# Patient Record
Sex: MALE | Race: WHITE | NOT HISPANIC OR LATINO | Employment: FULL TIME | URBAN - METROPOLITAN AREA
[De-identification: names, ages, dates, MRNs, and addresses within clinical notes are randomized per-mention and may not be internally consistent; named-entity substitution may affect disease eponyms.]

---

## 2017-08-08 ENCOUNTER — HOSPITAL ENCOUNTER (OUTPATIENT)
Dept: GENERAL RADIOLOGY | Age: 52
Discharge: HOME | End: 2017-08-08
Attending: ORTHOPAEDIC SURGERY

## 2017-08-08 ENCOUNTER — HOSPITAL ENCOUNTER (OUTPATIENT)
Dept: ULTRASOUND IMAGING | Age: 52
Discharge: HOME | End: 2017-08-08
Attending: ORTHOPAEDIC SURGERY

## 2017-08-08 DIAGNOSIS — M85.672 OTHER CYST OF BONE, LEFT ANKLE AND FOOT: ICD-10-CM

## 2017-08-08 PROCEDURE — 20550 NJX 1 TENDON SHEATH/LIGAMENT: CPT | Performed by: RADIOLOGY

## 2017-08-08 PROCEDURE — 76942 ECHO GUIDE FOR BIOPSY: CPT | Performed by: RADIOLOGY

## 2017-08-08 RX ORDER — TRIAMCINOLONE ACETONIDE 40 MG/ML
40 INJECTION, SUSPENSION INTRA-ARTICULAR; INTRAMUSCULAR ONCE
Status: COMPLETED | OUTPATIENT
Start: 2017-08-08 | End: 2017-08-08

## 2017-08-08 RX ADMIN — TRIAMCINOLONE ACETONIDE 40 MG: 40 INJECTION, SUSPENSION INTRA-ARTICULAR; INTRAMUSCULAR at 14:31

## 2017-12-23 ENCOUNTER — HOSPITAL ENCOUNTER (EMERGENCY)
Age: 52
Discharge: HOME | End: 2017-12-24
Attending: EMERGENCY MEDICINE

## 2017-12-23 VITALS
RESPIRATION RATE: 16 BRPM | OXYGEN SATURATION: 96 % | DIASTOLIC BLOOD PRESSURE: 97 MMHG | SYSTOLIC BLOOD PRESSURE: 160 MMHG | TEMPERATURE: 98.2 F | HEART RATE: 110 BPM

## 2017-12-23 DIAGNOSIS — L03.319 CELLULITIS AND ABSCESS OF TRUNK: Primary | ICD-10-CM

## 2017-12-23 DIAGNOSIS — L02.219 CELLULITIS AND ABSCESS OF TRUNK: Primary | ICD-10-CM

## 2017-12-23 PROCEDURE — 99284 EMERGENCY DEPT VISIT MOD MDM: CPT | Performed by: EMERGENCY MEDICINE

## 2017-12-23 PROCEDURE — 99282 EMERGENCY DEPT VISIT SF MDM: CPT

## 2017-12-23 ASSESSMENT — PAIN SCALES - GENERAL: PAINLEVEL_OUTOF10: 0

## 2017-12-24 ENCOUNTER — HOSPITAL ENCOUNTER (OUTPATIENT)
Age: 52
Setting detail: OBSERVATION
Discharge: HOME | End: 2017-12-28
Attending: HOSPITALIST

## 2017-12-24 DIAGNOSIS — E86.0 DEHYDRATION: ICD-10-CM

## 2017-12-24 DIAGNOSIS — L02.214 ABSCESS OF LEFT GROIN: Primary | ICD-10-CM

## 2017-12-24 DIAGNOSIS — L03.314 CELLULITIS OF GROIN, LEFT: ICD-10-CM

## 2017-12-24 LAB
ALBUMIN SERPL-MCNC: 3.5 G/DL (ref 3.6–5.1)
ALBUMIN/GLOB SERPL: 0.9 {RATIO} (ref 1–2.4)
ALP SERPL-CCNC: 75 UNITS/L (ref 45–117)
ALT SERPL-CCNC: 40 UNITS/L
ANION GAP SERPL CALC-SCNC: 11 MMOL/L (ref 10–20)
AST SERPL-CCNC: 29 UNITS/L
BASOPHILS # BLD AUTO: 0 K/MCL (ref 0–0.3)
BASOPHILS NFR BLD AUTO: 0 %
BILIRUB SERPL-MCNC: 0.3 MG/DL (ref 0.2–1)
BUN SERPL-MCNC: 21 MG/DL (ref 6–20)
BUN/CREAT SERPL: 17 (ref 7–25)
CALCIUM SERPL-MCNC: 11.2 MG/DL (ref 8.4–10.2)
CHLORIDE SERPL-SCNC: 104 MMOL/L (ref 98–107)
CO2 SERPL-SCNC: 32 MMOL/L (ref 21–32)
CREAT SERPL-MCNC: 1.25 MG/DL (ref 0.67–1.17)
DIFFERENTIAL METHOD BLD: ABNORMAL
EOSINOPHIL # BLD AUTO: 0.2 K/MCL (ref 0.1–0.5)
EOSINOPHIL NFR SPEC: 1 %
ERYTHROCYTE [DISTWIDTH] IN BLOOD: 13.1 % (ref 11–15)
GLOBULIN SER-MCNC: 3.7 G/DL (ref 2–4)
GLUCOSE SERPL-MCNC: 111 MG/DL (ref 65–99)
HCT VFR BLD CALC: 42.8 % (ref 39–51)
HGB BLD-MCNC: 14 G/DL (ref 13–17)
LACTATE SERPL-SCNC: 1.1 MMOL/L (ref 0–2)
LYMPHOCYTES # BLD MANUAL: 2.2 K/MCL (ref 1–4)
LYMPHOCYTES NFR BLD MANUAL: 16 %
MCH RBC QN AUTO: 31.3 PG (ref 26–34)
MCHC RBC AUTO-ENTMCNC: 32.7 G/DL (ref 32–36.5)
MCV RBC AUTO: 95.5 FL (ref 78–100)
MONOCYTES # BLD MANUAL: 1.5 K/MCL (ref 0.3–0.9)
MONOCYTES NFR BLD MANUAL: 10 %
NEUTROPHILS # BLD: 10.3 K/MCL (ref 1.8–7.7)
NEUTROPHILS NFR BLD AUTO: 73 %
PLATELET # BLD: 338 K/MCL (ref 140–450)
POTASSIUM SERPL-SCNC: 4 MMOL/L (ref 3.4–5.1)
PROT SERPL-MCNC: 7.2 G/DL (ref 6.4–8.2)
RBC # BLD: 4.48 MIL/MCL (ref 4.5–5.9)
SODIUM SERPL-SCNC: 143 MMOL/L (ref 135–145)
WBC # BLD: 14.2 K/MCL (ref 4.2–11)

## 2017-12-24 PROCEDURE — 10002807 HB RX 258: Performed by: HOSPITALIST

## 2017-12-24 PROCEDURE — 10002807 HB RX 258: Performed by: PHYSICIAN ASSISTANT

## 2017-12-24 PROCEDURE — 96375 TX/PRO/DX INJ NEW DRUG ADDON: CPT

## 2017-12-24 PROCEDURE — 87040 BLOOD CULTURE FOR BACTERIA: CPT

## 2017-12-24 PROCEDURE — 96376 TX/PRO/DX INJ SAME DRUG ADON: CPT

## 2017-12-24 PROCEDURE — 10002800 HB RX 250 W HCPCS: Performed by: HOSPITALIST

## 2017-12-24 PROCEDURE — 99283 EMERGENCY DEPT VISIT LOW MDM: CPT

## 2017-12-24 PROCEDURE — 10002803 HB RX 637: Performed by: EMERGENCY MEDICINE

## 2017-12-24 PROCEDURE — 83605 ASSAY OF LACTIC ACID: CPT

## 2017-12-24 PROCEDURE — 99285 EMERGENCY DEPT VISIT HI MDM: CPT | Performed by: PHYSICIAN ASSISTANT

## 2017-12-24 PROCEDURE — 36415 COLL VENOUS BLD VENIPUNCTURE: CPT

## 2017-12-24 PROCEDURE — 96361 HYDRATE IV INFUSION ADD-ON: CPT

## 2017-12-24 PROCEDURE — 96365 THER/PROPH/DIAG IV INF INIT: CPT

## 2017-12-24 PROCEDURE — 10002800 HB RX 250 W HCPCS: Performed by: PHYSICIAN ASSISTANT

## 2017-12-24 PROCEDURE — 10002803 HB RX 637: Performed by: HOSPITALIST

## 2017-12-24 PROCEDURE — 80053 COMPREHEN METABOLIC PANEL: CPT

## 2017-12-24 PROCEDURE — 96366 THER/PROPH/DIAG IV INF ADDON: CPT

## 2017-12-24 PROCEDURE — 85025 COMPLETE CBC W/AUTO DIFF WBC: CPT

## 2017-12-24 PROCEDURE — G0378 HOSPITAL OBSERVATION PER HR: HCPCS

## 2017-12-24 RX ORDER — CEPHALEXIN 500 MG/1
500 CAPSULE ORAL 4 TIMES DAILY
Qty: 40 CAPSULE | Refills: 0 | Status: ON HOLD | OUTPATIENT
Start: 2017-12-24 | End: 2017-12-25 | Stop reason: ALTCHOICE

## 2017-12-24 RX ORDER — ONDANSETRON 2 MG/ML
4 INJECTION INTRAMUSCULAR; INTRAVENOUS ONCE
Status: COMPLETED | OUTPATIENT
Start: 2017-12-24 | End: 2017-12-24

## 2017-12-24 RX ORDER — ONDANSETRON 2 MG/ML
4 INJECTION INTRAMUSCULAR; INTRAVENOUS EVERY 12 HOURS PRN
Status: DISCONTINUED | OUTPATIENT
Start: 2017-12-24 | End: 2017-12-25

## 2017-12-24 RX ORDER — CLOTRIMAZOLE 1 %
CREAM (GRAM) TOPICAL 2 TIMES DAILY
Qty: 30 G | Refills: 0 | Status: SHIPPED | OUTPATIENT
Start: 2017-12-24 | End: 2018-03-15 | Stop reason: CLARIF

## 2017-12-24 RX ORDER — 0.9 % SODIUM CHLORIDE 0.9 %
2 VIAL (ML) INJECTION EVERY 12 HOURS SCHEDULED
Status: DISCONTINUED | OUTPATIENT
Start: 2017-12-24 | End: 2017-12-28 | Stop reason: HOSPADM

## 2017-12-24 RX ORDER — LORATADINE 10 MG/1
10 TABLET ORAL
Status: DISCONTINUED | OUTPATIENT
Start: 2017-12-25 | End: 2017-12-28 | Stop reason: HOSPADM

## 2017-12-24 RX ORDER — CEPHALEXIN 500 MG/1
500 CAPSULE ORAL ONCE
Status: COMPLETED | OUTPATIENT
Start: 2017-12-24 | End: 2017-12-24

## 2017-12-24 RX ORDER — ENOXAPARIN SODIUM 100 MG/ML
40 INJECTION SUBCUTANEOUS EVERY 24 HOURS
Status: DISCONTINUED | OUTPATIENT
Start: 2017-12-25 | End: 2017-12-28 | Stop reason: HOSPADM

## 2017-12-24 RX ORDER — 0.9 % SODIUM CHLORIDE 0.9 %
2 VIAL (ML) INJECTION PRN
Status: DISCONTINUED | OUTPATIENT
Start: 2017-12-24 | End: 2017-12-28 | Stop reason: HOSPADM

## 2017-12-24 RX ORDER — TRAMADOL HYDROCHLORIDE 50 MG/1
50 TABLET ORAL EVERY 6 HOURS PRN
Status: DISCONTINUED | OUTPATIENT
Start: 2017-12-24 | End: 2017-12-28 | Stop reason: HOSPADM

## 2017-12-24 RX ADMIN — TRAMADOL HYDROCHLORIDE 50 MG: 50 TABLET, FILM COATED ORAL at 22:01

## 2017-12-24 RX ADMIN — HYDROMORPHONE HYDROCHLORIDE 0.5 MG: 1 INJECTION, SOLUTION INTRAMUSCULAR; INTRAVENOUS; SUBCUTANEOUS at 23:55

## 2017-12-24 RX ADMIN — ONDANSETRON 4 MG: 2 INJECTION INTRAMUSCULAR; INTRAVENOUS at 19:48

## 2017-12-24 RX ADMIN — VANCOMYCIN HYDROCHLORIDE 500 MG: 500 INJECTION, POWDER, LYOPHILIZED, FOR SOLUTION INTRAVENOUS at 23:18

## 2017-12-24 RX ADMIN — CEPHALEXIN 500 MG: 500 CAPSULE ORAL at 00:21

## 2017-12-24 RX ADMIN — HYDROMORPHONE HYDROCHLORIDE 0.5 MG: 1 INJECTION, SOLUTION INTRAMUSCULAR; INTRAVENOUS; SUBCUTANEOUS at 19:49

## 2017-12-24 RX ADMIN — VANCOMYCIN HYDROCHLORIDE 1000 MG: 1 INJECTION, POWDER, LYOPHILIZED, FOR SOLUTION INTRAVENOUS at 20:58

## 2017-12-24 RX ADMIN — SODIUM CHLORIDE 1000 ML: 9 INJECTION, SOLUTION INTRAVENOUS at 20:59

## 2017-12-24 ASSESSMENT — ENCOUNTER SYMPTOMS
NAUSEA: 0
COLOR CHANGE: 1
ABDOMINAL PAIN: 0
WEAKNESS: 0
DIZZINESS: 0
SHORTNESS OF BREATH: 0
WOUND: 1
BACK PAIN: 0
VOMITING: 0
ADENOPATHY: 0
NERVOUS/ANXIOUS: 0
FATIGUE: 0
DIARRHEA: 0
CONFUSION: 0
CHILLS: 0
SORE THROAT: 0
LIGHT-HEADEDNESS: 0
ACTIVITY CHANGE: 1
HEADACHES: 0
FEVER: 0

## 2017-12-24 ASSESSMENT — ACTIVITIES OF DAILY LIVING (ADL)
ADL_BEFORE_ADMISSION: INDEPENDENT
ADL_SHORT_OF_BREATH: NO
CHRONIC_PAIN_PRESENT: NO
ADL_SCORE: 12
RECENT_DECLINE_ADL: NO

## 2017-12-24 ASSESSMENT — LIFESTYLE VARIABLES
HOW OFTEN DO YOU HAVE A DRINK CONTAINING ALCOHOL: NEVER
ALCOHOL_USE_STATUS: NO OR LOW RISK WITH VALIDATED TOOL
HOW OFTEN DO YOU HAVE 6 OR MORE DRINKS ON ONE OCCASION: NEVER
AUDIT-C TOTAL SCORE: 0
HOW MANY STANDARD DRINKS CONTAINING ALCOHOL DO YOU HAVE ON A TYPICAL DAY: 0,1 OR 2
E-CIGARETTE_USE: NO E-CIGARETTES USE IN THE LAST 30 DAYS

## 2017-12-24 ASSESSMENT — PAIN SCALES - GENERAL
PAINLEVEL_OUTOF10: 9
PAIN_LEVEL_AT_REST: 7
PAIN_LEVEL_AT_REST: 7
PAINLEVEL_OUTOF10: 7
PAIN_LEVEL_AT_REST: 7

## 2017-12-24 ASSESSMENT — COGNITIVE AND FUNCTIONAL STATUS - GENERAL
ARE YOU DEAF OR DO YOU HAVE SERIOUS DIFFICULTY  HEARING: NO
ARE YOU BLIND OR DO YOU HAVE SERIOUS DIFFICULTY SEEING, EVEN WHEN WEARING GLASSES: NO

## 2017-12-25 LAB — CREAT SERPL-MCNC: 1.06 MG/DL (ref 0.67–1.17)

## 2017-12-25 PROCEDURE — 10002803 HB RX 637: Performed by: HOSPITALIST

## 2017-12-25 PROCEDURE — 10002800 HB RX 250 W HCPCS: Performed by: HOSPITALIST

## 2017-12-25 PROCEDURE — 82565 ASSAY OF CREATININE: CPT

## 2017-12-25 PROCEDURE — 10002519 HB I&D

## 2017-12-25 PROCEDURE — 10004651 HB RX, NO CHARGE ITEM: Performed by: HOSPITALIST

## 2017-12-25 PROCEDURE — 87077 CULTURE AEROBIC IDENTIFY: CPT

## 2017-12-25 PROCEDURE — 36415 COLL VENOUS BLD VENIPUNCTURE: CPT

## 2017-12-25 PROCEDURE — 96366 THER/PROPH/DIAG IV INF ADDON: CPT

## 2017-12-25 PROCEDURE — 87070 CULTURE OTHR SPECIMN AEROBIC: CPT

## 2017-12-25 PROCEDURE — G0378 HOSPITAL OBSERVATION PER HR: HCPCS

## 2017-12-25 PROCEDURE — 99220 INITIAL OBSERVATION CARE,LEVL III: CPT | Performed by: HOSPITALIST

## 2017-12-25 PROCEDURE — 96372 THER/PROPH/DIAG INJ SC/IM: CPT | Performed by: HOSPITALIST

## 2017-12-25 PROCEDURE — 99225 SUBSEQUENT OBSERVATION CARE LEVEL II: CPT | Performed by: HOSPITALIST

## 2017-12-25 PROCEDURE — 10061 I&D ABSCESS COMP/MULTIPLE: CPT | Performed by: SURGERY

## 2017-12-25 PROCEDURE — 99243 OFF/OP CNSLTJ NEW/EST LOW 30: CPT | Performed by: SURGERY

## 2017-12-25 PROCEDURE — 96376 TX/PRO/DX INJ SAME DRUG ADON: CPT

## 2017-12-25 PROCEDURE — 87186 SC STD MICRODIL/AGAR DIL: CPT

## 2017-12-25 RX ORDER — ACETAMINOPHEN 325 MG/1
650 TABLET ORAL EVERY 4 HOURS PRN
Status: DISCONTINUED | OUTPATIENT
Start: 2017-12-25 | End: 2017-12-28 | Stop reason: HOSPADM

## 2017-12-25 RX ORDER — ONDANSETRON 2 MG/ML
4 INJECTION INTRAMUSCULAR; INTRAVENOUS 2 TIMES DAILY PRN
Status: DISCONTINUED | OUTPATIENT
Start: 2017-12-25 | End: 2017-12-28 | Stop reason: HOSPADM

## 2017-12-25 RX ORDER — L. ACIDOPHILUS/PECTIN, CITRUS 25MM-100MG
1 TABLET ORAL 2 TIMES DAILY
Status: DISCONTINUED | OUTPATIENT
Start: 2017-12-25 | End: 2017-12-28 | Stop reason: HOSPADM

## 2017-12-25 RX ORDER — KRILL/OM-3/DHA/EPA/PHOSPHO/AST 1000-230MG
1 CAPSULE ORAL DAILY
COMMUNITY
End: 2019-05-11 | Stop reason: ALTCHOICE

## 2017-12-25 RX ADMIN — Medication 1 TABLET: at 17:13

## 2017-12-25 RX ADMIN — SODIUM CHLORIDE, PRESERVATIVE FREE 2 ML: 5 INJECTION INTRAVENOUS at 20:33

## 2017-12-25 RX ADMIN — ENOXAPARIN SODIUM 40 MG: 40 INJECTION SUBCUTANEOUS at 09:28

## 2017-12-25 RX ADMIN — HYDROMORPHONE HYDROCHLORIDE 1 MG: 1 INJECTION, SOLUTION INTRAMUSCULAR; INTRAVENOUS; SUBCUTANEOUS at 01:10

## 2017-12-25 RX ADMIN — Medication 1 TABLET: at 17:07

## 2017-12-25 RX ADMIN — ACETAMINOPHEN 650 MG: 325 TABLET ORAL at 20:32

## 2017-12-25 RX ADMIN — LORATADINE 10 MG: 10 TABLET ORAL at 06:10

## 2017-12-25 RX ADMIN — VANCOMYCIN HYDROCHLORIDE 1500 MG: 10 INJECTION, POWDER, LYOPHILIZED, FOR SOLUTION INTRAVENOUS at 07:20

## 2017-12-25 RX ADMIN — Medication 1250 MG: at 17:07

## 2017-12-25 RX ADMIN — TRIMETHOBENZAMIDE HYDROCHLORIDE 200 MG: 100 INJECTION INTRAMUSCULAR at 17:07

## 2017-12-25 RX ADMIN — TRAMADOL HYDROCHLORIDE 50 MG: 50 TABLET, FILM COATED ORAL at 06:09

## 2017-12-25 RX ADMIN — HYDROMORPHONE HYDROCHLORIDE 0.5 MG: 1 INJECTION, SOLUTION INTRAMUSCULAR; INTRAVENOUS; SUBCUTANEOUS at 07:25

## 2017-12-25 RX ADMIN — HYDROMORPHONE HYDROCHLORIDE 0.5 MG: 1 INJECTION, SOLUTION INTRAMUSCULAR; INTRAVENOUS; SUBCUTANEOUS at 09:27

## 2017-12-25 RX ADMIN — SODIUM CHLORIDE, PRESERVATIVE FREE 2 ML: 5 INJECTION INTRAVENOUS at 09:28

## 2017-12-25 RX ADMIN — ACETAMINOPHEN 650 MG: 325 TABLET ORAL at 14:38

## 2017-12-25 RX ADMIN — TRAMADOL HYDROCHLORIDE 50 MG: 50 TABLET, FILM COATED ORAL at 17:13

## 2017-12-25 RX ADMIN — ONDANSETRON 4 MG: 2 INJECTION INTRAMUSCULAR; INTRAVENOUS at 15:20

## 2017-12-25 ASSESSMENT — PAIN SCALES - GENERAL
PAIN_LEVEL_AT_REST: 3
PAIN_LEVEL_AT_REST: 6
PAIN_LEVEL_AT_REST: 7
PAIN_LEVEL_AT_REST: 6
PAIN_LEVEL_AT_REST: 8
PAIN_LEVEL_AT_REST: 5
PAIN_LEVEL_AT_REST: 5
PAIN_LEVEL_AT_REST: 2
PAIN_LEVEL_AT_REST: 5
PAIN_LEVEL_AT_REST: 2
PAIN_LEVEL_AT_REST: 6
PAIN_LEVEL_AT_REST: 7
PAIN_LEVEL_AT_REST: 5

## 2017-12-26 LAB
ERYTHROCYTE [DISTWIDTH] IN BLOOD: 13.1 % (ref 11–15)
HCT VFR BLD CALC: 40.4 % (ref 39–51)
HGB BLD-MCNC: 12.8 G/DL (ref 13–17)
MCH RBC QN AUTO: 30.3 PG (ref 26–34)
MCHC RBC AUTO-ENTMCNC: 31.7 G/DL (ref 32–36.5)
MCV RBC AUTO: 95.7 FL (ref 78–100)
PLATELET # BLD: 282 K/MCL (ref 140–450)
RBC # BLD: 4.22 MIL/MCL (ref 4.5–5.9)
WBC # BLD: 9.3 K/MCL (ref 4.2–11)

## 2017-12-26 PROCEDURE — 10002803 HB RX 637: Performed by: INTERNAL MEDICINE

## 2017-12-26 PROCEDURE — 96367 TX/PROPH/DG ADDL SEQ IV INF: CPT

## 2017-12-26 PROCEDURE — 10002800 HB RX 250 W HCPCS: Performed by: HOSPITALIST

## 2017-12-26 PROCEDURE — 99220 INITIAL OBSERVATION CARE,LEVL III: CPT | Performed by: INTERNAL MEDICINE

## 2017-12-26 PROCEDURE — 10002800 HB RX 250 W HCPCS: Performed by: INTERNAL MEDICINE

## 2017-12-26 PROCEDURE — 10002803 HB RX 637: Performed by: HOSPITALIST

## 2017-12-26 PROCEDURE — 96366 THER/PROPH/DIAG IV INF ADDON: CPT

## 2017-12-26 PROCEDURE — 36415 COLL VENOUS BLD VENIPUNCTURE: CPT

## 2017-12-26 PROCEDURE — 10004651 HB RX, NO CHARGE ITEM: Performed by: HOSPITALIST

## 2017-12-26 PROCEDURE — 85027 COMPLETE CBC AUTOMATED: CPT

## 2017-12-26 PROCEDURE — 96372 THER/PROPH/DIAG INJ SC/IM: CPT | Performed by: HOSPITALIST

## 2017-12-26 PROCEDURE — G0378 HOSPITAL OBSERVATION PER HR: HCPCS

## 2017-12-26 PROCEDURE — 96376 TX/PRO/DX INJ SAME DRUG ADON: CPT

## 2017-12-26 RX ADMIN — ENOXAPARIN SODIUM 40 MG: 40 INJECTION SUBCUTANEOUS at 08:38

## 2017-12-26 RX ADMIN — Medication 1 TABLET: at 18:04

## 2017-12-26 RX ADMIN — ACETAMINOPHEN 650 MG: 325 TABLET ORAL at 08:38

## 2017-12-26 RX ADMIN — TAZOBACTAM SODIUM AND PIPERACILLIN SODIUM 3.38 G: 375; 3 INJECTION, SOLUTION INTRAVENOUS at 22:17

## 2017-12-26 RX ADMIN — ONDANSETRON 4 MG: 2 INJECTION INTRAMUSCULAR; INTRAVENOUS at 16:25

## 2017-12-26 RX ADMIN — ACETAMINOPHEN 650 MG: 325 TABLET ORAL at 11:59

## 2017-12-26 RX ADMIN — TAZOBACTAM SODIUM AND PIPERACILLIN SODIUM 3.38 G: 375; 3 INJECTION, SOLUTION INTRAVENOUS at 15:42

## 2017-12-26 RX ADMIN — ACETAMINOPHEN 650 MG: 325 TABLET ORAL at 16:25

## 2017-12-26 RX ADMIN — ACETAMINOPHEN 650 MG: 325 TABLET ORAL at 02:18

## 2017-12-26 RX ADMIN — Medication 1 TABLET: at 08:38

## 2017-12-26 RX ADMIN — SODIUM CHLORIDE, PRESERVATIVE FREE 2 ML: 5 INJECTION INTRAVENOUS at 20:54

## 2017-12-26 RX ADMIN — ACETAMINOPHEN 650 MG: 325 TABLET ORAL at 20:52

## 2017-12-26 RX ADMIN — TAZOBACTAM SODIUM AND PIPERACILLIN SODIUM 3.38 G: 375; 3 INJECTION, SOLUTION INTRAVENOUS at 08:41

## 2017-12-26 RX ADMIN — Medication 1250 MG: at 18:39

## 2017-12-26 RX ADMIN — Medication 1250 MG: at 06:32

## 2017-12-26 RX ADMIN — PSYLLIUM HUSK 1 PACKET: 6 GRANULE ORAL at 11:59

## 2017-12-26 RX ADMIN — LORATADINE 10 MG: 10 TABLET ORAL at 06:35

## 2017-12-26 ASSESSMENT — PAIN SCALES - GENERAL
PAIN_LEVEL_AT_REST: 4
PAIN_LEVEL_AT_REST: 7
PAIN_LEVEL_AT_REST: 3
PAIN_LEVEL_AT_REST: 7
PAIN_LEVEL_AT_REST: 5
PAIN_LEVEL_AT_REST: 7
PAIN_LEVEL_AT_REST: 7
PAIN_LEVEL_AT_REST: 2
PAIN_LEVEL_AT_REST: 0
PAIN_LEVEL_AT_REST: 6
PAIN_LEVEL_AT_REST: 5

## 2017-12-27 LAB
ANION GAP SERPL CALC-SCNC: 11 MMOL/L (ref 10–20)
BASOPHILS # BLD AUTO: 0 K/MCL (ref 0–0.3)
BASOPHILS NFR BLD AUTO: 0 %
BUN SERPL-MCNC: 11 MG/DL (ref 6–20)
BUN/CREAT SERPL: 12 (ref 7–25)
CALCIUM SERPL-MCNC: 10.6 MG/DL (ref 8.4–10.2)
CHLORIDE SERPL-SCNC: 105 MMOL/L (ref 98–107)
CO2 SERPL-SCNC: 29 MMOL/L (ref 21–32)
CREAT SERPL-MCNC: 0.94 MG/DL (ref 0.67–1.17)
DIFFERENTIAL METHOD BLD: ABNORMAL
EOSINOPHIL # BLD AUTO: 0.3 K/MCL (ref 0.1–0.5)
EOSINOPHIL NFR SPEC: 4 %
ERYTHROCYTE [DISTWIDTH] IN BLOOD: 13 % (ref 11–15)
GLUCOSE SERPL-MCNC: 100 MG/DL (ref 65–99)
HCT VFR BLD CALC: 41 % (ref 39–51)
HGB BLD-MCNC: 12.9 G/DL (ref 13–17)
LYMPHOCYTES # BLD MANUAL: 1.9 K/MCL (ref 1–4)
LYMPHOCYTES NFR BLD MANUAL: 20 %
MCH RBC QN AUTO: 30.1 PG (ref 26–34)
MCHC RBC AUTO-ENTMCNC: 31.5 G/DL (ref 32–36.5)
MCV RBC AUTO: 95.6 FL (ref 78–100)
MONOCYTES # BLD MANUAL: 1.2 K/MCL (ref 0.3–0.9)
MONOCYTES NFR BLD MANUAL: 12 %
NEUTROPHILS # BLD: 6.1 K/MCL (ref 1.8–7.7)
NEUTROPHILS NFR BLD AUTO: 64 %
PLATELET # BLD: 318 K/MCL (ref 140–450)
POTASSIUM SERPL-SCNC: 3.5 MMOL/L (ref 3.4–5.1)
RBC # BLD: 4.29 MIL/MCL (ref 4.5–5.9)
SODIUM SERPL-SCNC: 141 MMOL/L (ref 135–145)
WBC # BLD: 9.4 K/MCL (ref 4.2–11)

## 2017-12-27 PROCEDURE — 96366 THER/PROPH/DIAG IV INF ADDON: CPT

## 2017-12-27 PROCEDURE — 10002803 HB RX 637: Performed by: INTERNAL MEDICINE

## 2017-12-27 PROCEDURE — 80048 BASIC METABOLIC PNL TOTAL CA: CPT

## 2017-12-27 PROCEDURE — G0378 HOSPITAL OBSERVATION PER HR: HCPCS

## 2017-12-27 PROCEDURE — 10004651 HB RX, NO CHARGE ITEM: Performed by: HOSPITALIST

## 2017-12-27 PROCEDURE — 96376 TX/PRO/DX INJ SAME DRUG ADON: CPT

## 2017-12-27 PROCEDURE — 10002800 HB RX 250 W HCPCS: Performed by: HOSPITALIST

## 2017-12-27 PROCEDURE — 87493 C DIFF AMPLIFIED PROBE: CPT

## 2017-12-27 PROCEDURE — 10002803 HB RX 637: Performed by: HOSPITALIST

## 2017-12-27 PROCEDURE — 99217 OBSERVATION CARE DISCHARGE: CPT | Performed by: INTERNAL MEDICINE

## 2017-12-27 PROCEDURE — 36415 COLL VENOUS BLD VENIPUNCTURE: CPT

## 2017-12-27 PROCEDURE — 85025 COMPLETE CBC W/AUTO DIFF WBC: CPT

## 2017-12-27 RX ORDER — SULFAMETHOXAZOLE AND TRIMETHOPRIM 800; 160 MG/1; MG/1
1 TABLET ORAL EVERY 12 HOURS SCHEDULED
Status: DISCONTINUED | OUTPATIENT
Start: 2017-12-27 | End: 2017-12-28 | Stop reason: HOSPADM

## 2017-12-27 RX ORDER — CEPHALEXIN 500 MG/1
1000 CAPSULE ORAL EVERY 12 HOURS SCHEDULED
Status: SHIPPED | COMMUNITY
Start: 2017-12-27 | End: 2017-12-27 | Stop reason: HOSPADM

## 2017-12-27 RX ORDER — SULFAMETHOXAZOLE AND TRIMETHOPRIM 800; 160 MG/1; MG/1
1 TABLET ORAL 2 TIMES DAILY
Qty: 20 TABLET | Refills: 0 | Status: SHIPPED | OUTPATIENT
Start: 2017-12-27 | End: 2018-01-07

## 2017-12-27 RX ORDER — HYDROCODONE BITARTRATE AND ACETAMINOPHEN 5; 325 MG/1; MG/1
1 TABLET ORAL EVERY 6 HOURS PRN
Qty: 10 TABLET | Refills: 0 | Status: SHIPPED | OUTPATIENT
Start: 2017-12-27 | End: 2018-01-15 | Stop reason: CLARIF

## 2017-12-27 RX ORDER — CEPHALEXIN 500 MG/1
1000 CAPSULE ORAL EVERY 12 HOURS SCHEDULED
Status: DISCONTINUED | OUTPATIENT
Start: 2017-12-27 | End: 2017-12-27

## 2017-12-27 RX ADMIN — SODIUM CHLORIDE, PRESERVATIVE FREE 2 ML: 5 INJECTION INTRAVENOUS at 20:14

## 2017-12-27 RX ADMIN — Medication 1 TABLET: at 18:29

## 2017-12-27 RX ADMIN — HYDROMORPHONE HYDROCHLORIDE 0.5 MG: 1 INJECTION, SOLUTION INTRAMUSCULAR; INTRAVENOUS; SUBCUTANEOUS at 07:28

## 2017-12-27 RX ADMIN — LORATADINE 10 MG: 10 TABLET ORAL at 05:46

## 2017-12-27 RX ADMIN — SULFAMETHOXAZOLE AND TRIMETHOPRIM 1 TABLET: 800; 160 TABLET ORAL at 20:14

## 2017-12-27 RX ADMIN — ACETAMINOPHEN 650 MG: 325 TABLET ORAL at 09:05

## 2017-12-27 RX ADMIN — ACETAMINOPHEN 650 MG: 325 TABLET ORAL at 22:29

## 2017-12-27 RX ADMIN — PSYLLIUM HUSK 1 PACKET: 6 GRANULE ORAL at 09:06

## 2017-12-27 RX ADMIN — Medication 1 TABLET: at 09:06

## 2017-12-27 RX ADMIN — ACETAMINOPHEN 650 MG: 325 TABLET ORAL at 14:31

## 2017-12-27 RX ADMIN — Medication 1250 MG: at 05:48

## 2017-12-27 RX ADMIN — ACETAMINOPHEN 650 MG: 325 TABLET ORAL at 18:29

## 2017-12-27 ASSESSMENT — PAIN SCALES - GENERAL
PAIN_LEVEL_AT_REST: 4
PAIN_LEVEL_AT_REST: 4
PAIN_LEVEL_AT_REST: 5
PAIN_LEVEL_AT_REST: 4
PAIN_LEVEL_AT_REST: 7
PAIN_LEVEL_AT_REST: 9
PAIN_LEVEL_AT_REST: 4
PAIN_LEVEL_AT_REST: 4
PAIN_LEVEL_AT_REST: 5
PAIN_LEVEL_AT_REST: 6
PAIN_LEVEL_AT_REST: 6
PAIN_LEVEL_AT_REST: 7

## 2017-12-28 VITALS
TEMPERATURE: 97.9 F | HEART RATE: 85 BPM | WEIGHT: 252.6 LBS | HEIGHT: 71 IN | SYSTOLIC BLOOD PRESSURE: 123 MMHG | BODY MASS INDEX: 35.36 KG/M2 | DIASTOLIC BLOOD PRESSURE: 75 MMHG | RESPIRATION RATE: 18 BRPM | OXYGEN SATURATION: 93 %

## 2017-12-28 LAB
C DIFF DNA SPEC QL NAA+PROBE: NOT DETECTED
SPECIMEN SOURCE: NORMAL

## 2017-12-28 PROCEDURE — 10002803 HB RX 637: Performed by: INTERNAL MEDICINE

## 2017-12-28 PROCEDURE — 10002803 HB RX 637: Performed by: HOSPITALIST

## 2017-12-28 PROCEDURE — 10002800 HB RX 250 W HCPCS: Performed by: HOSPITALIST

## 2017-12-28 PROCEDURE — 96376 TX/PRO/DX INJ SAME DRUG ADON: CPT

## 2017-12-28 PROCEDURE — 96372 THER/PROPH/DIAG INJ SC/IM: CPT | Performed by: HOSPITALIST

## 2017-12-28 PROCEDURE — 10004651 HB RX, NO CHARGE ITEM: Performed by: HOSPITALIST

## 2017-12-28 PROCEDURE — G0378 HOSPITAL OBSERVATION PER HR: HCPCS

## 2017-12-28 RX ADMIN — Medication 1 TABLET: at 09:18

## 2017-12-28 RX ADMIN — ENOXAPARIN SODIUM 40 MG: 40 INJECTION SUBCUTANEOUS at 08:55

## 2017-12-28 RX ADMIN — SULFAMETHOXAZOLE AND TRIMETHOPRIM 1 TABLET: 800; 160 TABLET ORAL at 09:19

## 2017-12-28 RX ADMIN — ACETAMINOPHEN 650 MG: 325 TABLET ORAL at 09:26

## 2017-12-28 RX ADMIN — SODIUM CHLORIDE, PRESERVATIVE FREE 2 ML: 5 INJECTION INTRAVENOUS at 09:23

## 2017-12-28 RX ADMIN — HYDROMORPHONE HYDROCHLORIDE 0.5 MG: 1 INJECTION, SOLUTION INTRAMUSCULAR; INTRAVENOUS; SUBCUTANEOUS at 06:51

## 2017-12-28 RX ADMIN — LORATADINE 10 MG: 10 TABLET ORAL at 08:55

## 2017-12-28 ASSESSMENT — PAIN SCALES - GENERAL
PAIN_LEVEL_AT_REST: 4
PAIN_LEVEL_AT_REST: 6
PAIN_LEVEL_AT_REST: 4
PAIN_LEVEL_AT_REST: 7

## 2017-12-29 LAB
ANNOTATION COMMENT IMP: ABNORMAL
BACTERIA SPEC ANAEROBE+AEROBE CULT: ABNORMAL
GRAM STN SPEC: ABNORMAL
GRAM STN SPEC: ABNORMAL
ORGANISM: ABNORMAL
ORGANISM: ABNORMAL
REPORT STATUS (RPT): ABNORMAL
SPECIMEN SOURCE: ABNORMAL

## 2017-12-30 LAB
BACTERIA BLD CULT: NORMAL
BACTERIA BLD CULT: NORMAL
REPORT STATUS (RPT): NORMAL
REPORT STATUS (RPT): NORMAL
SPECIMEN SOURCE: NORMAL
SPECIMEN SOURCE: NORMAL

## 2018-01-15 ENCOUNTER — OFFICE VISIT (OUTPATIENT)
Dept: SURGERY | Age: 53
End: 2018-01-15

## 2018-01-15 VITALS — DIASTOLIC BLOOD PRESSURE: 78 MMHG | SYSTOLIC BLOOD PRESSURE: 130 MMHG | RESPIRATION RATE: 12 BRPM | HEART RATE: 100 BPM

## 2018-01-15 DIAGNOSIS — L72.3 SEBACEOUS CYST: ICD-10-CM

## 2018-01-15 DIAGNOSIS — L02.214 ABSCESS OF LEFT GROIN: Primary | ICD-10-CM

## 2018-01-15 PROCEDURE — 99024 POSTOP FOLLOW-UP VISIT: CPT | Performed by: SURGERY

## 2018-01-16 ENCOUNTER — PREP FOR CASE (OUTPATIENT)
Dept: SURGERY | Age: 53
End: 2018-01-16

## 2018-01-16 DIAGNOSIS — L72.3 SEBACEOUS CYST: Primary | ICD-10-CM

## 2018-01-16 RX ORDER — 0.9 % SODIUM CHLORIDE 0.9 %
2 VIAL (ML) INJECTION EVERY 12 HOURS SCHEDULED
Status: CANCELLED | OUTPATIENT
Start: 2018-01-16

## 2018-01-16 RX ORDER — 0.9 % SODIUM CHLORIDE 0.9 %
2 VIAL (ML) INJECTION PRN
Status: CANCELLED | OUTPATIENT
Start: 2018-01-16

## 2018-02-20 PROCEDURE — 10004348 HB COUNTER-EVAL PRE-OP

## 2018-02-20 ASSESSMENT — ACTIVITIES OF DAILY LIVING (ADL)
ADL_SHORT_OF_BREATH: NO
RECENT_DECLINE_ADL: NO
ADL_BEFORE_ADMISSION: INDEPENDENT
NEEDS_ASSIST: NO
ADL_SCORE: 12
CHRONIC_PAIN_PRESENT: YES, CHRONIC

## 2018-02-22 ENCOUNTER — ANESTHESIA EVENT (OUTPATIENT)
Dept: SURGERY | Age: 53
End: 2018-02-22

## 2018-02-23 ENCOUNTER — HOSPITAL ENCOUNTER (OUTPATIENT)
Age: 53
Discharge: HOME | End: 2018-02-23
Attending: SURGERY | Admitting: SURGERY

## 2018-02-23 ENCOUNTER — OFF PREMISE (OUTPATIENT)
Dept: SURGERY | Age: 53
End: 2018-02-23

## 2018-02-23 ENCOUNTER — ANESTHESIA (OUTPATIENT)
Dept: SURGERY | Age: 53
End: 2018-02-23

## 2018-02-23 ENCOUNTER — SURGERY (OUTPATIENT)
Age: 53
End: 2018-02-23

## 2018-02-23 DIAGNOSIS — L72.3 SEBACEOUS CYST: ICD-10-CM

## 2018-02-23 PROCEDURE — 10002359 HB ANESTH GENERAL ADD'L 1/2 HR: Performed by: SURGERY

## 2018-02-23 PROCEDURE — 10002807 HB RX 258: Performed by: ANESTHESIOLOGY

## 2018-02-23 PROCEDURE — 10002358 HB ANESTH GENERAL 1ST 1/2 HR: Performed by: SURGERY

## 2018-02-23 PROCEDURE — 10002767 HB EXTENDED RECOVERY PER HOUR: Performed by: SURGERY

## 2018-02-23 PROCEDURE — 10002801 HB RX 250 W/O HCPCS: Performed by: SURGERY

## 2018-02-23 PROCEDURE — 11422 EXC H-F-NK-SP B9+MARG 1.1-2: CPT | Performed by: SURGERY

## 2018-02-23 PROCEDURE — 10002803 HB RX 637: Performed by: NURSE PRACTITIONER

## 2018-02-23 PROCEDURE — 87205 SMEAR GRAM STAIN: CPT

## 2018-02-23 PROCEDURE — 10002117 HB ADDITIONAL SURGERY TIME/30 MIN: Performed by: SURGERY

## 2018-02-23 PROCEDURE — 10002800 HB RX 250 W HCPCS: Performed by: ANESTHESIOLOGY

## 2018-02-23 PROCEDURE — 10004560 HB COUNTER-EXTENDED RECOVERY PER HOUR

## 2018-02-23 PROCEDURE — 11403 EXC TR-EXT B9+MARG 2.1-3CM: CPT | Performed by: SURGERY

## 2018-02-23 PROCEDURE — 10004452 HB PACU ADDL 30 MINUTES: Performed by: SURGERY

## 2018-02-23 PROCEDURE — 10004316 HB COUNTER-PREP

## 2018-02-23 PROCEDURE — 88304 TISSUE EXAM BY PATHOLOGIST: CPT

## 2018-02-23 PROCEDURE — 93005 ELECTROCARDIOGRAM TRACING: CPT | Performed by: ANESTHESIOLOGY

## 2018-02-23 PROCEDURE — 10002801 HB RX 250 W/O HCPCS: Performed by: ANESTHESIOLOGY

## 2018-02-23 PROCEDURE — 87077 CULTURE AEROBIC IDENTIFY: CPT

## 2018-02-23 PROCEDURE — 10004451 HB PACU RECOVERY 1ST 30 MINUTES: Performed by: SURGERY

## 2018-02-23 PROCEDURE — 87076 CULTURE ANAEROBE IDENT EACH: CPT

## 2018-02-23 PROCEDURE — 10002800 HB RX 250 W HCPCS: Performed by: SURGERY

## 2018-02-23 PROCEDURE — 93010 ELECTROCARDIOGRAM REPORT: CPT | Performed by: INTERNAL MEDICINE

## 2018-02-23 PROCEDURE — 11406 EXC TR-EXT B9+MARG >4.0 CM: CPT | Performed by: SURGERY

## 2018-02-23 PROCEDURE — 10001453 HB GENERAL SURGERY 1: Performed by: SURGERY

## 2018-02-23 RX ORDER — BUPIVACAINE HYDROCHLORIDE AND EPINEPHRINE 5; 5 MG/ML; UG/ML
INJECTION, SOLUTION EPIDURAL; INTRACAUDAL; PERINEURAL
Status: DISCONTINUED
Start: 2018-02-23 | End: 2018-02-23 | Stop reason: HOSPADM

## 2018-02-23 RX ORDER — LIDOCAINE HYDROCHLORIDE 10 MG/ML
5-10 INJECTION, SOLUTION INFILTRATION; PERINEURAL PRN
Status: DISCONTINUED | OUTPATIENT
Start: 2018-02-23 | End: 2018-02-23 | Stop reason: HOSPADM

## 2018-02-23 RX ORDER — LIDOCAINE HYDROCHLORIDE 10 MG/ML
INJECTION, SOLUTION INFILTRATION; PERINEURAL PRN
Status: DISCONTINUED | OUTPATIENT
Start: 2018-02-23 | End: 2018-02-23

## 2018-02-23 RX ORDER — 0.9 % SODIUM CHLORIDE 0.9 %
2 VIAL (ML) INJECTION EVERY 12 HOURS SCHEDULED
Status: DISCONTINUED | OUTPATIENT
Start: 2018-02-23 | End: 2018-02-23 | Stop reason: HOSPADM

## 2018-02-23 RX ORDER — MIDAZOLAM HYDROCHLORIDE 1 MG/ML
INJECTION, SOLUTION INTRAMUSCULAR; INTRAVENOUS PRN
Status: DISCONTINUED | OUTPATIENT
Start: 2018-02-23 | End: 2018-02-23

## 2018-02-23 RX ORDER — ACETAMINOPHEN 325 MG/1
650 TABLET ORAL EVERY 4 HOURS PRN
Status: DISCONTINUED | OUTPATIENT
Start: 2018-02-23 | End: 2018-02-23 | Stop reason: HOSPADM

## 2018-02-23 RX ORDER — ONDANSETRON 2 MG/ML
4 INJECTION INTRAMUSCULAR; INTRAVENOUS 2 TIMES DAILY PRN
Status: DISCONTINUED | OUTPATIENT
Start: 2018-02-23 | End: 2018-02-23 | Stop reason: HOSPADM

## 2018-02-23 RX ORDER — LIDOCAINE HYDROCHLORIDE 10 MG/ML
INJECTION, SOLUTION EPIDURAL; INFILTRATION; INTRACAUDAL; PERINEURAL
Status: DISCONTINUED
Start: 2018-02-23 | End: 2018-02-23 | Stop reason: HOSPADM

## 2018-02-23 RX ORDER — SODIUM CHLORIDE, SODIUM LACTATE, POTASSIUM CHLORIDE, CALCIUM CHLORIDE 600; 310; 30; 20 MG/100ML; MG/100ML; MG/100ML; MG/100ML
INJECTION, SOLUTION INTRAVENOUS CONTINUOUS
Status: DISCONTINUED | OUTPATIENT
Start: 2018-02-23 | End: 2018-02-23 | Stop reason: HOSPADM

## 2018-02-23 RX ORDER — ACETAMINOPHEN 10 MG/ML
INJECTION, SOLUTION INTRAVENOUS
Status: COMPLETED
Start: 2018-02-23 | End: 2018-02-23

## 2018-02-23 RX ORDER — CEFAZOLIN SODIUM/WATER 2 G/20 ML
2000 SYRINGE (ML) INTRAVENOUS
Status: COMPLETED | OUTPATIENT
Start: 2018-02-23 | End: 2018-02-23

## 2018-02-23 RX ORDER — ONDANSETRON 2 MG/ML
INJECTION INTRAMUSCULAR; INTRAVENOUS PRN
Status: DISCONTINUED | OUTPATIENT
Start: 2018-02-23 | End: 2018-02-23

## 2018-02-23 RX ORDER — HYDROCODONE BITARTRATE AND ACETAMINOPHEN 5; 325 MG/1; MG/1
1-2 TABLET ORAL EVERY 6 HOURS PRN
Qty: 15 TABLET | Refills: 0 | Status: SHIPPED | OUTPATIENT
Start: 2018-02-23 | End: 2018-03-15 | Stop reason: CLARIF

## 2018-02-23 RX ORDER — HYDROCODONE BITARTRATE AND ACETAMINOPHEN 5; 325 MG/1; MG/1
1-2 TABLET ORAL EVERY 4 HOURS PRN
Status: DISCONTINUED | OUTPATIENT
Start: 2018-02-23 | End: 2018-02-23 | Stop reason: HOSPADM

## 2018-02-23 RX ORDER — SODIUM CHLORIDE 9 MG/ML
INJECTION, SOLUTION INTRAVENOUS CONTINUOUS
Status: DISCONTINUED | OUTPATIENT
Start: 2018-02-23 | End: 2018-02-23 | Stop reason: HOSPADM

## 2018-02-23 RX ORDER — DEXAMETHASONE SODIUM PHOSPHATE 4 MG/ML
INJECTION, SOLUTION INTRA-ARTICULAR; INTRALESIONAL; INTRAMUSCULAR; INTRAVENOUS; SOFT TISSUE PRN
Status: DISCONTINUED | OUTPATIENT
Start: 2018-02-23 | End: 2018-02-23

## 2018-02-23 RX ORDER — PROPOFOL 10 MG/ML
INJECTION, EMULSION INTRAVENOUS PRN
Status: DISCONTINUED | OUTPATIENT
Start: 2018-02-23 | End: 2018-02-23

## 2018-02-23 RX ORDER — SODIUM CHLORIDE, SODIUM LACTATE, POTASSIUM CHLORIDE, CALCIUM CHLORIDE 600; 310; 30; 20 MG/100ML; MG/100ML; MG/100ML; MG/100ML
INJECTION, SOLUTION INTRAVENOUS CONTINUOUS PRN
Status: DISCONTINUED | OUTPATIENT
Start: 2018-02-23 | End: 2018-02-23

## 2018-02-23 RX ORDER — DIPHENHYDRAMINE HYDROCHLORIDE 50 MG/ML
INJECTION INTRAMUSCULAR; INTRAVENOUS PRN
Status: DISCONTINUED | OUTPATIENT
Start: 2018-02-23 | End: 2018-02-23

## 2018-02-23 RX ORDER — 0.9 % SODIUM CHLORIDE 0.9 %
2 VIAL (ML) INJECTION PRN
Status: DISCONTINUED | OUTPATIENT
Start: 2018-02-23 | End: 2018-02-23 | Stop reason: HOSPADM

## 2018-02-23 RX ORDER — ACETAMINOPHEN 10 MG/ML
INJECTION, SOLUTION INTRAVENOUS PRN
Status: DISCONTINUED | OUTPATIENT
Start: 2018-02-23 | End: 2018-02-23

## 2018-02-23 RX ADMIN — Medication 2000 MG: at 12:27

## 2018-02-23 RX ADMIN — ACETAMINOPHEN 1000 MG: 10 INJECTION, SOLUTION INTRAVENOUS at 12:59

## 2018-02-23 RX ADMIN — ONDANSETRON 4 MG: 2 INJECTION INTRAMUSCULAR; INTRAVENOUS at 12:55

## 2018-02-23 RX ADMIN — FENTANYL CITRATE 50 MCG: 50 INJECTION INTRAMUSCULAR; INTRAVENOUS at 12:21

## 2018-02-23 RX ADMIN — LIDOCAINE HYDROCHLORIDE 40 MG: 10 INJECTION, SOLUTION INFILTRATION; PERINEURAL at 12:21

## 2018-02-23 RX ADMIN — SODIUM CHLORIDE, POTASSIUM CHLORIDE, SODIUM LACTATE AND CALCIUM CHLORIDE: 600; 310; 30; 20 INJECTION, SOLUTION INTRAVENOUS at 12:59

## 2018-02-23 RX ADMIN — PROPOFOL 250 MG: 10 INJECTION, EMULSION INTRAVENOUS at 12:23

## 2018-02-23 RX ADMIN — DEXAMETHASONE SODIUM PHOSPHATE 8 MG: 4 INJECTION, SOLUTION INTRAMUSCULAR; INTRAVENOUS at 12:55

## 2018-02-23 RX ADMIN — DIPHENHYDRAMINE HYDROCHLORIDE 25 MG: 50 INJECTION, SOLUTION INTRAMUSCULAR; INTRAVENOUS at 12:56

## 2018-02-23 RX ADMIN — FENTANYL CITRATE 50 MCG: 50 INJECTION INTRAMUSCULAR; INTRAVENOUS at 12:23

## 2018-02-23 RX ADMIN — MORPHINE SULFATE 2 MG: 2 INJECTION, SOLUTION INTRAMUSCULAR; INTRAVENOUS at 12:50

## 2018-02-23 RX ADMIN — HYDROCODONE BITARTRATE AND ACETAMINOPHEN 2 TABLET: 5; 325 TABLET ORAL at 14:42

## 2018-02-23 RX ADMIN — FENTANYL CITRATE 25 MCG: 50 INJECTION INTRAMUSCULAR; INTRAVENOUS at 13:50

## 2018-02-23 RX ADMIN — SODIUM CHLORIDE, POTASSIUM CHLORIDE, SODIUM LACTATE AND CALCIUM CHLORIDE: 600; 310; 30; 20 INJECTION, SOLUTION INTRAVENOUS at 11:16

## 2018-02-23 RX ADMIN — MIDAZOLAM HYDROCHLORIDE 2 MG: 1 INJECTION, SOLUTION INTRAMUSCULAR; INTRAVENOUS at 12:17

## 2018-02-23 RX ADMIN — FENTANYL CITRATE 25 MCG: 50 INJECTION INTRAMUSCULAR; INTRAVENOUS at 13:55

## 2018-02-23 RX ADMIN — LIDOCAINE HYDROCHLORIDE 18 ML: 10 INJECTION, SOLUTION EPIDURAL; INFILTRATION; INTRACAUDAL; PERINEURAL at 13:20

## 2018-02-23 RX ADMIN — MORPHINE SULFATE 2 MG: 2 INJECTION, SOLUTION INTRAMUSCULAR; INTRAVENOUS at 12:53

## 2018-02-23 RX ADMIN — SODIUM CHLORIDE, POTASSIUM CHLORIDE, SODIUM LACTATE AND CALCIUM CHLORIDE: 600; 310; 30; 20 INJECTION, SOLUTION INTRAVENOUS at 10:09

## 2018-02-23 ASSESSMENT — PAIN SCALES - GENERAL
PAIN_LEVEL_AT_REST: 0
PAIN_LEVEL_AT_REST: 5
PAIN_LEVEL_AT_REST: 0
PAIN_LEVEL_AT_REST: 5
PAIN_LEVEL_AT_REST: 0
PAIN_LEVEL_AT_REST: 6
PAIN_LEVEL_AT_REST: 0
PAIN_LEVEL_AT_REST: 5
PAIN_LEVEL_AT_REST: 6
PAIN_LEVEL_AT_REST: 5

## 2018-02-23 ASSESSMENT — ENCOUNTER SYMPTOMS
PSYCHIATRIC NEGATIVE: 1
RESPIRATORY NEGATIVE: 1
EYES NEGATIVE: 1
GASTROINTESTINAL NEGATIVE: 1
CONSTITUTIONAL NEGATIVE: 1
ENDOCRINE NEGATIVE: 1
EXERCISE TOLERANCE: GOOD

## 2018-02-24 LAB
ATRIAL RATE (BPM): 76
P AXIS (DEGREES): 23
PR-INTERVAL (MSEC): 170
QRS-INTERVAL (MSEC): 86
QT-INTERVAL (MSEC): 366
QTC: 411
R AXIS (DEGREES): 11
REPORT TEXT: NORMAL
T AXIS (DEGREES): 14
VENTRICULAR RATE EKG/MIN (BPM): 76

## 2018-02-26 LAB — PATHOLOGIST NAME: NORMAL

## 2018-02-27 LAB
BACTERIA SPEC ANAEROBE+AEROBE CULT: ABNORMAL
GRAM STN SPEC: ABNORMAL
REPORT STATUS (RPT): ABNORMAL
SPECIMEN SOURCE: ABNORMAL

## 2018-03-15 ENCOUNTER — OFFICE VISIT (OUTPATIENT)
Dept: SURGERY | Age: 53
End: 2018-03-15

## 2018-03-15 VITALS — DIASTOLIC BLOOD PRESSURE: 80 MMHG | HEART RATE: 72 BPM | RESPIRATION RATE: 12 BRPM | SYSTOLIC BLOOD PRESSURE: 120 MMHG

## 2018-03-15 DIAGNOSIS — L02.214 GROIN ABSCESS: Primary | ICD-10-CM

## 2018-03-15 PROCEDURE — 99024 POSTOP FOLLOW-UP VISIT: CPT | Performed by: SURGERY

## 2018-03-16 ENCOUNTER — OFFICE VISIT (OUTPATIENT)
Dept: SURGERY | Age: 53
End: 2018-03-16

## 2018-03-16 DIAGNOSIS — Z48.02 VISIT FOR SUTURE REMOVAL: Primary | ICD-10-CM

## 2018-03-16 PROCEDURE — 99024 POSTOP FOLLOW-UP VISIT: CPT | Performed by: SURGERY

## 2018-03-28 ENCOUNTER — OFFICE VISIT (OUTPATIENT)
Dept: SURGERY | Age: 53
End: 2018-03-28

## 2018-03-28 ENCOUNTER — HOSPITAL ENCOUNTER (OUTPATIENT)
Age: 53
End: 2018-03-28
Attending: SURGERY | Admitting: SURGERY

## 2018-03-28 ENCOUNTER — PREP FOR CASE (OUTPATIENT)
Dept: SURGERY | Age: 53
End: 2018-03-28

## 2018-03-28 VITALS
DIASTOLIC BLOOD PRESSURE: 84 MMHG | BODY MASS INDEX: 36.43 KG/M2 | WEIGHT: 253.9 LBS | HEART RATE: 84 BPM | SYSTOLIC BLOOD PRESSURE: 112 MMHG

## 2018-03-28 DIAGNOSIS — L02.219 CELLULITIS AND ABSCESS OF TRUNK: ICD-10-CM

## 2018-03-28 DIAGNOSIS — L03.319 CELLULITIS AND ABSCESS OF TRUNK: ICD-10-CM

## 2018-03-28 DIAGNOSIS — L72.3 SEBACEOUS CYST: Primary | ICD-10-CM

## 2018-03-28 PROCEDURE — 99024 POSTOP FOLLOW-UP VISIT: CPT | Performed by: SURGERY

## 2018-03-28 RX ORDER — 0.9 % SODIUM CHLORIDE 0.9 %
2 VIAL (ML) INJECTION EVERY 12 HOURS SCHEDULED
Status: CANCELLED | OUTPATIENT
Start: 2018-03-28

## 2018-03-28 RX ORDER — 0.9 % SODIUM CHLORIDE 0.9 %
2 VIAL (ML) INJECTION PRN
Status: CANCELLED | OUTPATIENT
Start: 2018-03-28

## 2018-05-16 ENCOUNTER — OFFICE VISIT (OUTPATIENT)
Dept: OCCUPATIONAL MEDICINE | Age: 53
End: 2018-05-16

## 2018-05-16 DIAGNOSIS — Z00.8 HEALTH EXAMINATION IN POPULATION SURVEY: Primary | ICD-10-CM

## 2018-05-16 PROCEDURE — OH123 RAPID TEST DRUG KIT & COLLECTION 5 PANEL: Performed by: PREVENTIVE MEDICINE

## 2018-05-25 ENCOUNTER — APPOINTMENT (OUTPATIENT)
Dept: SURGERY | Age: 53
End: 2018-05-25

## 2018-06-07 ENCOUNTER — APPOINTMENT (OUTPATIENT)
Dept: SURGERY | Age: 53
End: 2018-06-07

## 2019-01-02 ENCOUNTER — OFF PREMISE CHARGES (OUTPATIENT)
Dept: CARDIOLOGY | Age: 54
End: 2019-01-02

## 2019-01-02 ENCOUNTER — APPOINTMENT (OUTPATIENT)
Dept: CT IMAGING | Age: 54
DRG: 670 | End: 2019-01-02

## 2019-01-02 ENCOUNTER — HOSPITAL ENCOUNTER (INPATIENT)
Age: 54
LOS: 1 days | Discharge: HOME | DRG: 670 | End: 2019-01-04
Attending: HOSPITALIST

## 2019-01-02 DIAGNOSIS — R79.89 ELEVATED TROPONIN: Primary | ICD-10-CM

## 2019-01-02 DIAGNOSIS — N20.1 RIGHT DISTAL URETERAL CALCULUS: ICD-10-CM

## 2019-01-02 DIAGNOSIS — N20.1 URETERAL STONE: Primary | ICD-10-CM

## 2019-01-02 DIAGNOSIS — Z01.818 PREOPERATIVE CLEARANCE: ICD-10-CM

## 2019-01-02 DIAGNOSIS — R79.89 ELEVATED TROPONIN: ICD-10-CM

## 2019-01-02 LAB
ALBUMIN SERPL-MCNC: 4.5 G/DL (ref 3.6–5.1)
ALBUMIN/GLOB SERPL: 1.2 {RATIO} (ref 1–2.4)
ALP SERPL-CCNC: 63 UNITS/L (ref 45–117)
ALT SERPL-CCNC: 65 UNITS/L
ANION GAP SERPL CALC-SCNC: 13 MMOL/L (ref 10–20)
APPEARANCE UR: CLEAR
AST SERPL-CCNC: 38 UNITS/L
BACTERIA #/AREA URNS HPF: ABNORMAL /HPF
BASOPHILS # BLD AUTO: 0.1 K/MCL (ref 0–0.3)
BASOPHILS NFR BLD AUTO: 0 %
BILIRUB SERPL-MCNC: 1 MG/DL (ref 0.2–1)
BILIRUB UR QL STRIP: NEGATIVE
BUN SERPL-MCNC: 15 MG/DL (ref 6–20)
BUN/CREAT SERPL: 13 (ref 7–25)
CALCIUM SERPL-MCNC: 12 MG/DL (ref 8.4–10.2)
CHLORIDE SERPL-SCNC: 98 MMOL/L (ref 98–107)
CO2 SERPL-SCNC: 30 MMOL/L (ref 21–32)
COLOR UR: ABNORMAL
CREAT SERPL-MCNC: 1.12 MG/DL (ref 0.67–1.17)
DIFFERENTIAL METHOD BLD: ABNORMAL
EOSINOPHIL # BLD AUTO: 0.1 K/MCL (ref 0.1–0.5)
EOSINOPHIL NFR SPEC: 0 %
ERYTHROCYTE [DISTWIDTH] IN BLOOD: 13.2 % (ref 11–15)
GLOBULIN SER-MCNC: 3.8 G/DL (ref 2–4)
GLUCOSE SERPL-MCNC: 110 MG/DL (ref 65–99)
GLUCOSE UR STRIP-MCNC: NEGATIVE MG/DL
HCT VFR BLD CALC: 50.4 % (ref 39–51)
HGB BLD-MCNC: 16.2 G/DL (ref 13–17)
HGB UR QL STRIP: ABNORMAL
HYALINE CASTS #/AREA URNS LPF: ABNORMAL /LPF (ref 0–5)
IMM GRANULOCYTES # BLD AUTO: 0.2 K/MCL (ref 0–0.2)
IMM GRANULOCYTES NFR BLD: 1 %
KETONES UR STRIP-MCNC: NEGATIVE MG/DL
LEUKOCYTE ESTERASE UR QL STRIP: NEGATIVE
LYMPHOCYTES # BLD MANUAL: 1.6 K/MCL (ref 1–4)
LYMPHOCYTES NFR BLD MANUAL: 10 %
MCH RBC QN AUTO: 29.7 PG (ref 26–34)
MCHC RBC AUTO-ENTMCNC: 32.1 G/DL (ref 32–36.5)
MCV RBC AUTO: 92.5 FL (ref 78–100)
MONOCYTES # BLD MANUAL: 1.6 K/MCL (ref 0.3–0.9)
MONOCYTES NFR BLD MANUAL: 10 %
NEUTROPHILS # BLD: 12.9 K/MCL (ref 1.8–7.7)
NEUTROPHILS NFR BLD AUTO: 79 %
NITRITE UR QL STRIP: NEGATIVE
NRBC BLD MANUAL-RTO: 0 /100 WBC
PH UR STRIP: 6 UNITS (ref 5–7)
PLATELET # BLD: 321 K/MCL (ref 140–450)
POTASSIUM SERPL-SCNC: 4.2 MMOL/L (ref 3.4–5.1)
PROT SERPL-MCNC: 8.3 G/DL (ref 6.4–8.2)
PROT UR STRIP-MCNC: NEGATIVE MG/DL
RBC # BLD: 5.45 MIL/MCL (ref 4.5–5.9)
RBC #/AREA URNS HPF: ABNORMAL /HPF (ref 0–3)
SODIUM SERPL-SCNC: 137 MMOL/L (ref 135–145)
SP GR UR STRIP: <1.005 (ref 1–1.03)
SPECIMEN SOURCE: ABNORMAL
SQUAMOUS #/AREA URNS HPF: ABNORMAL /HPF (ref 0–5)
TROPONIN I SERPL-MCNC: 0.1 NG/ML
UROBILINOGEN UR STRIP-MCNC: 0.2 MG/DL (ref 0–1)
WBC # BLD: 16.3 K/MCL (ref 4.2–11)
WBC #/AREA URNS HPF: ABNORMAL /HPF (ref 0–5)

## 2019-01-02 PROCEDURE — 74176 CT ABD & PELVIS W/O CONTRAST: CPT

## 2019-01-02 PROCEDURE — 96376 TX/PRO/DX INJ SAME DRUG ADON: CPT

## 2019-01-02 PROCEDURE — 10002800 HB RX 250 W HCPCS: Performed by: PHYSICIAN ASSISTANT

## 2019-01-02 PROCEDURE — 85025 COMPLETE CBC W/AUTO DIFF WBC: CPT

## 2019-01-02 PROCEDURE — 81001 URINALYSIS AUTO W/SCOPE: CPT

## 2019-01-02 PROCEDURE — 84484 ASSAY OF TROPONIN QUANT: CPT

## 2019-01-02 PROCEDURE — 96375 TX/PRO/DX INJ NEW DRUG ADDON: CPT

## 2019-01-02 PROCEDURE — 36415 COLL VENOUS BLD VENIPUNCTURE: CPT

## 2019-01-02 PROCEDURE — 80053 COMPREHEN METABOLIC PANEL: CPT

## 2019-01-02 PROCEDURE — 99285 EMERGENCY DEPT VISIT HI MDM: CPT

## 2019-01-02 PROCEDURE — 93005 ELECTROCARDIOGRAM TRACING: CPT | Performed by: PHYSICIAN ASSISTANT

## 2019-01-02 PROCEDURE — 10002807 HB RX 258: Performed by: PHYSICIAN ASSISTANT

## 2019-01-02 PROCEDURE — 93010 ELECTROCARDIOGRAM REPORT: CPT | Performed by: INTERNAL MEDICINE

## 2019-01-02 PROCEDURE — 96374 THER/PROPH/DIAG INJ IV PUSH: CPT

## 2019-01-02 PROCEDURE — 74176 CT ABD & PELVIS W/O CONTRAST: CPT | Performed by: RADIOLOGY

## 2019-01-02 PROCEDURE — 96361 HYDRATE IV INFUSION ADD-ON: CPT

## 2019-01-02 RX ORDER — ONDANSETRON 2 MG/ML
4 INJECTION INTRAMUSCULAR; INTRAVENOUS ONCE
Status: COMPLETED | OUTPATIENT
Start: 2019-01-02 | End: 2019-01-02

## 2019-01-02 RX ADMIN — SODIUM CHLORIDE 1000 ML: 9 INJECTION, SOLUTION INTRAVENOUS at 22:35

## 2019-01-02 RX ADMIN — ONDANSETRON 4 MG: 2 INJECTION INTRAMUSCULAR; INTRAVENOUS at 22:36

## 2019-01-02 RX ADMIN — SODIUM CHLORIDE 500 ML: 9 INJECTION, SOLUTION INTRAVENOUS at 23:55

## 2019-01-02 RX ADMIN — HYDROMORPHONE HYDROCHLORIDE 1 MG: 1 INJECTION, SOLUTION INTRAMUSCULAR; INTRAVENOUS; SUBCUTANEOUS at 23:55

## 2019-01-02 RX ADMIN — HYDROMORPHONE HYDROCHLORIDE 1 MG: 1 INJECTION, SOLUTION INTRAMUSCULAR; INTRAVENOUS; SUBCUTANEOUS at 22:41

## 2019-01-02 ASSESSMENT — PAIN SCALES - GENERAL
PAINLEVEL_OUTOF10: 8
PAINLEVEL_OUTOF10: 9
PAINLEVEL_OUTOF10: 6
PAINLEVEL_OUTOF10: 9
PAIN_LEVEL_AT_REST: 8
PAIN_LEVEL_AT_REST: 9

## 2019-01-03 ENCOUNTER — ANESTHESIA EVENT (OUTPATIENT)
Dept: SURGERY | Age: 54
DRG: 670 | End: 2019-01-03

## 2019-01-03 ENCOUNTER — APPOINTMENT (OUTPATIENT)
Dept: GENERAL RADIOLOGY | Age: 54
DRG: 670 | End: 2019-01-03
Attending: UROLOGY

## 2019-01-03 ENCOUNTER — ANESTHESIA (OUTPATIENT)
Dept: SURGERY | Age: 54
DRG: 670 | End: 2019-01-03

## 2019-01-03 PROBLEM — N20.1 RIGHT DISTAL URETERAL CALCULUS: Status: ACTIVE | Noted: 2019-01-02

## 2019-01-03 LAB
ATRIAL RATE (BPM): 84
GLUCOSE BLDC GLUCOMTR-MCNC: 85 MG/DL (ref 65–99)
P AXIS (DEGREES): 32
PR-INTERVAL (MSEC): 186
QRS-INTERVAL (MSEC): 82
QT-INTERVAL (MSEC): 362
QTC: 427
REPORT TEXT: NORMAL
T AXIS (DEGREES): 19
TROPONIN I SERPL-MCNC: <0.02 NG/ML
VENTRICULAR RATE EKG/MIN (BPM): 84

## 2019-01-03 PROCEDURE — 0TC68ZZ EXTIRPATION OF MATTER FROM RIGHT URETER, VIA NATURAL OR ARTIFICIAL OPENING ENDOSCOPIC: ICD-10-PCS | Performed by: UROLOGY

## 2019-01-03 PROCEDURE — 82962 GLUCOSE BLOOD TEST: CPT

## 2019-01-03 PROCEDURE — 99254 IP/OBS CNSLTJ NEW/EST MOD 60: CPT | Performed by: UROLOGY

## 2019-01-03 PROCEDURE — 10002800 HB RX 250 W HCPCS: Performed by: HOSPITALIST

## 2019-01-03 PROCEDURE — 10002800 HB RX 250 W HCPCS: Performed by: UROLOGY

## 2019-01-03 PROCEDURE — 52353 CYSTOURETERO W/LITHOTRIPSY: CPT | Performed by: ANESTHESIOLOGIST ASSISTANT

## 2019-01-03 PROCEDURE — 10004651 HB RX, NO CHARGE ITEM: Performed by: HOSPITALIST

## 2019-01-03 PROCEDURE — 10002800 HB RX 250 W HCPCS: Performed by: ANESTHESIOLOGIST ASSISTANT

## 2019-01-03 PROCEDURE — 10002117 HB ADDITIONAL SURGERY TIME/30 MIN: Performed by: UROLOGY

## 2019-01-03 PROCEDURE — 84484 ASSAY OF TROPONIN QUANT: CPT

## 2019-01-03 PROCEDURE — 52353 CYSTOURETERO W/LITHOTRIPSY: CPT | Performed by: UROLOGY

## 2019-01-03 PROCEDURE — 82365 CALCULUS SPECTROSCOPY: CPT

## 2019-01-03 PROCEDURE — 74430 CONTRAST X-RAY BLADDER: CPT

## 2019-01-03 PROCEDURE — 10002800 HB RX 250 W HCPCS: Performed by: ANESTHESIOLOGY

## 2019-01-03 PROCEDURE — 10004452 HB PACU ADDL 30 MINUTES: Performed by: UROLOGY

## 2019-01-03 PROCEDURE — 10002803 HB RX 637: Performed by: HOSPITALIST

## 2019-01-03 PROCEDURE — 10002358 HB ANESTH GENERAL 1ST 1/2 HR: Performed by: UROLOGY

## 2019-01-03 PROCEDURE — 10004451 HB PACU RECOVERY 1ST 30 MINUTES: Performed by: UROLOGY

## 2019-01-03 PROCEDURE — 10002359 HB ANESTH GENERAL ADD'L 1/2 HR: Performed by: UROLOGY

## 2019-01-03 PROCEDURE — 10003445 HB TELEMETRY PER DAY

## 2019-01-03 PROCEDURE — 74018 RADEX ABDOMEN 1 VIEW: CPT

## 2019-01-03 PROCEDURE — 10000002 HB ROOM CHARGE MED SURG

## 2019-01-03 PROCEDURE — C1769 GUIDE WIRE: HCPCS | Performed by: UROLOGY

## 2019-01-03 PROCEDURE — 36415 COLL VENOUS BLD VENIPUNCTURE: CPT

## 2019-01-03 PROCEDURE — 52353 CYSTOURETERO W/LITHOTRIPSY: CPT | Performed by: ANESTHESIOLOGY

## 2019-01-03 PROCEDURE — 99223 1ST HOSP IP/OBS HIGH 75: CPT | Performed by: HOSPITALIST

## 2019-01-03 PROCEDURE — 10002807 HB RX 258: Performed by: ANESTHESIOLOGIST ASSISTANT

## 2019-01-03 PROCEDURE — 99254 IP/OBS CNSLTJ NEW/EST MOD 60: CPT | Performed by: INTERNAL MEDICINE

## 2019-01-03 PROCEDURE — 10002801 HB RX 250 W/O HCPCS: Performed by: ANESTHESIOLOGIST ASSISTANT

## 2019-01-03 PROCEDURE — 74018 RADEX ABDOMEN 1 VIEW: CPT | Performed by: RADIOLOGY

## 2019-01-03 PROCEDURE — 10003056 HB DISPOSABLE INSTRUMENT/SUPPLY 1: Performed by: UROLOGY

## 2019-01-03 PROCEDURE — 99285 EMERGENCY DEPT VISIT HI MDM: CPT | Performed by: PHYSICIAN ASSISTANT

## 2019-01-03 PROCEDURE — C1758 CATHETER, URETERAL: HCPCS | Performed by: UROLOGY

## 2019-01-03 PROCEDURE — 10001458: Performed by: UROLOGY

## 2019-01-03 PROCEDURE — 93005 ELECTROCARDIOGRAM TRACING: CPT | Performed by: NURSE PRACTITIONER

## 2019-01-03 PROCEDURE — 10002801 HB RX 250 W/O HCPCS: Performed by: UROLOGY

## 2019-01-03 PROCEDURE — 93010 ELECTROCARDIOGRAM REPORT: CPT | Performed by: INTERNAL MEDICINE

## 2019-01-03 RX ORDER — LIDOCAINE HYDROCHLORIDE 10 MG/ML
INJECTION, SOLUTION INFILTRATION; PERINEURAL PRN
Status: DISCONTINUED | OUTPATIENT
Start: 2019-01-03 | End: 2019-01-03

## 2019-01-03 RX ORDER — METOCLOPRAMIDE 5 MG/1
5 TABLET ORAL EVERY 6 HOURS PRN
Status: DISCONTINUED | OUTPATIENT
Start: 2019-01-03 | End: 2019-01-03

## 2019-01-03 RX ORDER — ACETAMINOPHEN 325 MG/1
650 TABLET ORAL EVERY 4 HOURS PRN
Status: DISCONTINUED | OUTPATIENT
Start: 2019-01-03 | End: 2019-01-04 | Stop reason: HOSPADM

## 2019-01-03 RX ORDER — NALOXONE HCL 0.4 MG/ML
0.2 VIAL (ML) INJECTION EVERY 5 MIN PRN
Status: DISCONTINUED | OUTPATIENT
Start: 2019-01-03 | End: 2019-01-03

## 2019-01-03 RX ORDER — CEFAZOLIN SODIUM/WATER 2 G/20 ML
2000 SYRINGE (ML) INTRAVENOUS
Status: COMPLETED | OUTPATIENT
Start: 2019-01-03 | End: 2019-01-03

## 2019-01-03 RX ORDER — SODIUM CHLORIDE 9 MG/ML
INJECTION, SOLUTION INTRAVENOUS CONTINUOUS PRN
Status: DISCONTINUED | OUTPATIENT
Start: 2019-01-03 | End: 2019-01-03

## 2019-01-03 RX ORDER — METOCLOPRAMIDE HYDROCHLORIDE 5 MG/ML
10 INJECTION INTRAMUSCULAR; INTRAVENOUS EVERY 6 HOURS PRN
Status: DISCONTINUED | OUTPATIENT
Start: 2019-01-03 | End: 2019-01-03

## 2019-01-03 RX ORDER — LORATADINE 10 MG/1
10 TABLET ORAL DAILY PRN
Status: DISCONTINUED | OUTPATIENT
Start: 2019-01-03 | End: 2019-01-04 | Stop reason: HOSPADM

## 2019-01-03 RX ORDER — OXYCODONE HYDROCHLORIDE 5 MG/1
10 TABLET ORAL
Status: DISCONTINUED | OUTPATIENT
Start: 2019-01-03 | End: 2019-01-03

## 2019-01-03 RX ORDER — TRAMADOL HYDROCHLORIDE 50 MG/1
50 TABLET ORAL ONCE
Status: COMPLETED | OUTPATIENT
Start: 2019-01-03 | End: 2019-01-03

## 2019-01-03 RX ORDER — TRAMADOL HYDROCHLORIDE 50 MG/1
50 TABLET ORAL EVERY 6 HOURS PRN
Status: DISCONTINUED | OUTPATIENT
Start: 2019-01-03 | End: 2019-01-03

## 2019-01-03 RX ORDER — ONDANSETRON 2 MG/ML
INJECTION INTRAMUSCULAR; INTRAVENOUS PRN
Status: DISCONTINUED | OUTPATIENT
Start: 2019-01-03 | End: 2019-01-03

## 2019-01-03 RX ORDER — PROPOFOL 10 MG/ML
INJECTION, EMULSION INTRAVENOUS PRN
Status: DISCONTINUED | OUTPATIENT
Start: 2019-01-03 | End: 2019-01-03

## 2019-01-03 RX ORDER — SODIUM CHLORIDE AND POTASSIUM CHLORIDE 150; 900 MG/100ML; MG/100ML
INJECTION, SOLUTION INTRAVENOUS CONTINUOUS
Status: DISCONTINUED | OUTPATIENT
Start: 2019-01-03 | End: 2019-01-04

## 2019-01-03 RX ORDER — 0.9 % SODIUM CHLORIDE 0.9 %
2 VIAL (ML) INJECTION PRN
Status: DISCONTINUED | OUTPATIENT
Start: 2019-01-03 | End: 2019-01-04 | Stop reason: HOSPADM

## 2019-01-03 RX ORDER — TRAMADOL HYDROCHLORIDE 50 MG/1
50 TABLET ORAL EVERY 6 HOURS PRN
Status: DISCONTINUED | OUTPATIENT
Start: 2019-01-03 | End: 2019-01-04 | Stop reason: HOSPADM

## 2019-01-03 RX ORDER — 0.9 % SODIUM CHLORIDE 0.9 %
2 VIAL (ML) INJECTION EVERY 12 HOURS SCHEDULED
Status: DISCONTINUED | OUTPATIENT
Start: 2019-01-03 | End: 2019-01-04 | Stop reason: HOSPADM

## 2019-01-03 RX ORDER — ACETAMINOPHEN 10 MG/ML
INJECTION, SOLUTION INTRAVENOUS PRN
Status: DISCONTINUED | OUTPATIENT
Start: 2019-01-03 | End: 2019-01-03

## 2019-01-03 RX ORDER — LORATADINE 10 MG/1
10 TABLET ORAL
Status: DISCONTINUED | OUTPATIENT
Start: 2019-01-03 | End: 2019-01-03

## 2019-01-03 RX ORDER — DEXAMETHASONE SODIUM PHOSPHATE 4 MG/ML
4 INJECTION, SOLUTION INTRA-ARTICULAR; INTRALESIONAL; INTRAMUSCULAR; INTRAVENOUS; SOFT TISSUE
Status: DISCONTINUED | OUTPATIENT
Start: 2019-01-03 | End: 2019-01-03

## 2019-01-03 RX ORDER — METOCLOPRAMIDE HYDROCHLORIDE 5 MG/ML
10 INJECTION INTRAMUSCULAR; INTRAVENOUS EVERY 6 HOURS PRN
Status: DISCONTINUED | OUTPATIENT
Start: 2019-01-03 | End: 2019-01-04 | Stop reason: HOSPADM

## 2019-01-03 RX ORDER — MIDAZOLAM HYDROCHLORIDE 1 MG/ML
INJECTION, SOLUTION INTRAMUSCULAR; INTRAVENOUS PRN
Status: DISCONTINUED | OUTPATIENT
Start: 2019-01-03 | End: 2019-01-03

## 2019-01-03 RX ORDER — ONDANSETRON 2 MG/ML
4 INJECTION INTRAMUSCULAR; INTRAVENOUS 2 TIMES DAILY PRN
Status: DISCONTINUED | OUTPATIENT
Start: 2019-01-03 | End: 2019-01-03

## 2019-01-03 RX ADMIN — ACETAMINOPHEN 1000 MG: 10 INJECTION, SOLUTION INTRAVENOUS at 16:42

## 2019-01-03 RX ADMIN — LIDOCAINE HYDROCHLORIDE 20 MG: 10 INJECTION, SOLUTION INFILTRATION; PERINEURAL at 16:14

## 2019-01-03 RX ADMIN — POTASSIUM CHLORIDE AND SODIUM CHLORIDE 100 ML/HR: 900; 150 INJECTION, SOLUTION INTRAVENOUS at 14:04

## 2019-01-03 RX ADMIN — POTASSIUM CHLORIDE AND SODIUM CHLORIDE 75 ML/HR: 900; 150 INJECTION, SOLUTION INTRAVENOUS at 02:43

## 2019-01-03 RX ADMIN — FENTANYL CITRATE 50 MCG: 50 INJECTION INTRAMUSCULAR; INTRAVENOUS at 16:27

## 2019-01-03 RX ADMIN — FENTANYL CITRATE 50 MCG: 50 INJECTION INTRAMUSCULAR; INTRAVENOUS at 16:14

## 2019-01-03 RX ADMIN — ACETAMINOPHEN 650 MG: 325 TABLET ORAL at 23:25

## 2019-01-03 RX ADMIN — SODIUM CHLORIDE: 9 INJECTION, SOLUTION INTRAVENOUS at 16:00

## 2019-01-03 RX ADMIN — TRAMADOL HYDROCHLORIDE 50 MG: 50 TABLET, COATED ORAL at 20:46

## 2019-01-03 RX ADMIN — ONDANSETRON 4 MG: 2 INJECTION INTRAMUSCULAR; INTRAVENOUS at 16:52

## 2019-01-03 RX ADMIN — SODIUM CHLORIDE, PRESERVATIVE FREE 2 ML: 5 INJECTION INTRAVENOUS at 20:17

## 2019-01-03 RX ADMIN — HYDROMORPHONE HYDROCHLORIDE 0.5 MG: 1 INJECTION, SOLUTION INTRAMUSCULAR; INTRAVENOUS; SUBCUTANEOUS at 02:42

## 2019-01-03 RX ADMIN — METOCLOPRAMIDE 10 MG: 5 INJECTION, SOLUTION INTRAMUSCULAR; INTRAVENOUS at 15:34

## 2019-01-03 RX ADMIN — PROPOFOL 250 MG: 10 INJECTION, EMULSION INTRAVENOUS at 16:14

## 2019-01-03 RX ADMIN — Medication 2000 MG: at 16:21

## 2019-01-03 RX ADMIN — MIDAZOLAM HYDROCHLORIDE 2 MG: 1 INJECTION, SOLUTION INTRAMUSCULAR; INTRAVENOUS at 16:08

## 2019-01-03 RX ADMIN — TRAMADOL HYDROCHLORIDE 50 MG: 50 TABLET, COATED ORAL at 06:47

## 2019-01-03 ASSESSMENT — ENCOUNTER SYMPTOMS
DIARRHEA: 0
WOUND: 0
BRUISES/BLEEDS EASILY: 0
WEAKNESS: 0
SORE THROAT: 0
RHINORRHEA: 0
ADENOPATHY: 0
FATIGUE: 0
BACK PAIN: 0
EXERCISE TOLERANCE: GOOD
CHEST TIGHTNESS: 0
APPETITE CHANGE: 0
NAUSEA: 1
ABDOMINAL PAIN: 1
CHILLS: 0
ACTIVITY CHANGE: 0
HEADACHES: 0
SEIZURES: 0
LIGHT-HEADEDNESS: 0
NERVOUS/ANXIOUS: 0
DIZZINESS: 0
ABDOMINAL DISTENTION: 0
CONSTIPATION: 0
VOMITING: 1
COLOR CHANGE: 0
AGITATION: 0
SHORTNESS OF BREATH: 0
CONFUSION: 0
NUMBNESS: 0
SINUS PRESSURE: 0
FEVER: 0

## 2019-01-03 ASSESSMENT — COGNITIVE AND FUNCTIONAL STATUS - GENERAL
BECAUSE OF A PHYSICAL, MENTAL, OR EMOTIONAL CONDITION, DO YOU HAVE DIFFICULTY DOING ERRANDS ALONE: NO
DO YOU HAVE SERIOUS DIFFICULTY WALKING OR CLIMBING STAIRS: NO
BECAUSE OF A PHYSICAL, MENTAL, OR EMOTIONAL CONDITION, DO YOU HAVE SERIOUS DIFFICULTY CONCENTRATING, REMEMBERING OR MAKING DECISIONS: NO
DO YOU HAVE DIFFICULTY DRESSING OR BATHING: NO
ARE YOU DEAF OR DO YOU HAVE SERIOUS DIFFICULTY  HEARING: NO
ARE YOU BLIND OR DO YOU HAVE SERIOUS DIFFICULTY SEEING, EVEN WHEN WEARING GLASSES: NO

## 2019-01-03 ASSESSMENT — PAIN SCALES - GENERAL
PAIN_LEVEL_AT_REST: 6
PAIN_LEVEL_AT_REST: 0
PAINLEVEL_OUTOF10: 5
PAIN_LEVEL_AT_REST: 6
PAIN_LEVEL_AT_REST: 0
PAIN_LEVEL_AT_REST: 9
PAIN_LEVEL_AT_REST: 0
PAIN_LEVEL_AT_REST: 7
PAIN_LEVEL_AT_REST: 0
PAIN_LEVEL_AT_REST: 7
PAIN_LEVEL_AT_REST: 6
PAIN_LEVEL_AT_REST: 3
PAIN_LEVEL_AT_REST: 6
PAINLEVEL_OUTOF10: 6

## 2019-01-03 ASSESSMENT — ACTIVITIES OF DAILY LIVING (ADL)
CHRONIC_PAIN_PRESENT: NO
ADL_SCORE: 12
ADL_BEFORE_ADMISSION: INDEPENDENT
ADL_SHORT_OF_BREATH: NO
RECENT_DECLINE_ADL: NO

## 2019-01-03 ASSESSMENT — COPD QUESTIONNAIRES: COPD: 0

## 2019-01-03 ASSESSMENT — LIFESTYLE VARIABLES
AUDIT-C TOTAL SCORE: 1
HOW OFTEN DO YOU HAVE 6 OR MORE DRINKS ON ONE OCCASION: NEVER
HOW MANY STANDARD DRINKS CONTAINING ALCOHOL DO YOU HAVE ON A TYPICAL DAY: 0,1 OR 2
HOW OFTEN DO YOU HAVE A DRINK CONTAINING ALCOHOL: MONTHLY OR LESS
ALCOHOL_USE_STATUS: NO OR LOW RISK WITH VALIDATED TOOL

## 2019-01-04 ENCOUNTER — TELEPHONE (OUTPATIENT)
Dept: UROLOGY | Age: 54
End: 2019-01-04

## 2019-01-04 VITALS
WEIGHT: 264.55 LBS | TEMPERATURE: 97.9 F | HEART RATE: 78 BPM | OXYGEN SATURATION: 93 % | BODY MASS INDEX: 37.04 KG/M2 | SYSTOLIC BLOOD PRESSURE: 121 MMHG | RESPIRATION RATE: 18 BRPM | HEIGHT: 71 IN | DIASTOLIC BLOOD PRESSURE: 73 MMHG

## 2019-01-04 DIAGNOSIS — N20.1 RIGHT DISTAL URETERAL CALCULUS: Primary | ICD-10-CM

## 2019-01-04 LAB
ATRIAL RATE (BPM): 129
P AXIS (DEGREES): 88
PR-INTERVAL (MSEC): 174
QRS-INTERVAL (MSEC): 70
QT-INTERVAL (MSEC): 290
QTC: 420
R AXIS (DEGREES): 66
REPORT TEXT: NORMAL
T AXIS (DEGREES): 45
VENTRICULAR RATE EKG/MIN (BPM): 126

## 2019-01-04 PROCEDURE — 10004651 HB RX, NO CHARGE ITEM: Performed by: HOSPITALIST

## 2019-01-04 PROCEDURE — 99239 HOSP IP/OBS DSCHRG MGMT >30: CPT | Performed by: INTERNAL MEDICINE

## 2019-01-04 PROCEDURE — 10002803 HB RX 637: Performed by: HOSPITALIST

## 2019-01-04 RX ORDER — TRAMADOL HYDROCHLORIDE 50 MG/1
50 TABLET ORAL EVERY 6 HOURS PRN
Qty: 30 TABLET | Refills: 0 | Status: SHIPPED | OUTPATIENT
Start: 2019-01-04 | End: 2019-05-11 | Stop reason: ALTCHOICE

## 2019-01-04 RX ADMIN — TRAMADOL HYDROCHLORIDE 50 MG: 50 TABLET, COATED ORAL at 09:59

## 2019-01-04 RX ADMIN — SODIUM CHLORIDE, PRESERVATIVE FREE 2 ML: 5 INJECTION INTRAVENOUS at 08:17

## 2019-01-04 RX ADMIN — TRAMADOL HYDROCHLORIDE 50 MG: 50 TABLET, COATED ORAL at 02:54

## 2019-01-04 RX ADMIN — ACETAMINOPHEN 650 MG: 325 TABLET ORAL at 06:32

## 2019-01-04 ASSESSMENT — PAIN SCALES - GENERAL
PAIN_LEVEL_AT_REST: 6
PAIN_LEVEL_AT_REST: 7
PAIN_LEVEL_AT_REST: 4
PAIN_LEVEL_AT_REST: 0
PAIN_LEVEL_AT_REST: 4
PAIN_LEVEL_AT_REST: 7

## 2019-01-07 LAB
STONE ANALYSIS-IMP: NORMAL
STONE ANALYSIS-IMP: NORMAL

## 2019-05-11 ENCOUNTER — APPOINTMENT (OUTPATIENT)
Dept: CT IMAGING | Age: 54
End: 2019-05-11
Attending: EMERGENCY MEDICINE

## 2019-05-11 ENCOUNTER — HOSPITAL ENCOUNTER (OUTPATIENT)
Age: 54
Setting detail: OBSERVATION
Discharge: HOME | End: 2019-05-12
Attending: EMERGENCY MEDICINE | Admitting: HOSPITALIST

## 2019-05-11 DIAGNOSIS — N23 RENAL COLIC ON LEFT SIDE: ICD-10-CM

## 2019-05-11 DIAGNOSIS — R52 INTRACTABLE PAIN: Primary | ICD-10-CM

## 2019-05-11 DIAGNOSIS — N13.9 OBSTRUCTIVE UROPATHY: ICD-10-CM

## 2019-05-11 LAB
ALBUMIN SERPL-MCNC: 4.2 G/DL (ref 3.6–5.1)
ALBUMIN/GLOB SERPL: 1.1 {RATIO} (ref 1–2.4)
ALP SERPL-CCNC: 70 UNITS/L (ref 45–117)
ALT SERPL-CCNC: 78 UNITS/L
ANION GAP SERPL CALC-SCNC: 13 MMOL/L (ref 10–20)
AST SERPL-CCNC: 37 UNITS/L
BASOPHILS # BLD AUTO: 0.1 K/MCL (ref 0–0.3)
BASOPHILS NFR BLD AUTO: 0 %
BILIRUB SERPL-MCNC: 0.6 MG/DL (ref 0.2–1)
BUN SERPL-MCNC: 16 MG/DL (ref 6–20)
BUN/CREAT SERPL: 15 (ref 7–25)
CALCIUM SERPL-MCNC: 11.4 MG/DL (ref 8.4–10.2)
CHLORIDE SERPL-SCNC: 104 MMOL/L (ref 98–107)
CO2 SERPL-SCNC: 29 MMOL/L (ref 21–32)
CREAT SERPL-MCNC: 1.1 MG/DL (ref 0.67–1.17)
DIFFERENTIAL METHOD BLD: ABNORMAL
EOSINOPHIL # BLD AUTO: 0.2 K/MCL (ref 0.1–0.5)
EOSINOPHIL NFR SPEC: 1 %
ERYTHROCYTE [DISTWIDTH] IN BLOOD: 13.2 % (ref 11–15)
GLOBULIN SER-MCNC: 3.7 G/DL (ref 2–4)
GLUCOSE SERPL-MCNC: 123 MG/DL (ref 65–99)
HCT VFR BLD CALC: 51.1 % (ref 39–51)
HGB BLD-MCNC: 16.3 G/DL (ref 13–17)
IMM GRANULOCYTES # BLD AUTO: 0.2 K/MCL (ref 0–0.2)
IMM GRANULOCYTES NFR BLD: 1 %
LYMPHOCYTES # BLD MANUAL: 1.4 K/MCL (ref 1–4)
LYMPHOCYTES NFR BLD MANUAL: 12 %
MCH RBC QN AUTO: 29.5 PG (ref 26–34)
MCHC RBC AUTO-ENTMCNC: 31.9 G/DL (ref 32–36.5)
MCV RBC AUTO: 92.4 FL (ref 78–100)
MONOCYTES # BLD MANUAL: 1.1 K/MCL (ref 0.3–0.9)
MONOCYTES NFR BLD MANUAL: 9 %
NEUTROPHILS # BLD: 8.5 K/MCL (ref 1.8–7.7)
NEUTROPHILS NFR BLD AUTO: 77 %
NRBC BLD MANUAL-RTO: 0 /100 WBC
PLATELET # BLD: 331 K/MCL (ref 140–450)
POTASSIUM SERPL-SCNC: 4.1 MMOL/L (ref 3.4–5.1)
PROT SERPL-MCNC: 7.9 G/DL (ref 6.4–8.2)
RBC # BLD: 5.53 MIL/MCL (ref 4.5–5.9)
SODIUM SERPL-SCNC: 142 MMOL/L (ref 135–145)
WBC # BLD: 11.3 K/MCL (ref 4.2–11)

## 2019-05-11 PROCEDURE — 10004651 HB RX, NO CHARGE ITEM: Performed by: HOSPITALIST

## 2019-05-11 PROCEDURE — 99220 INITIAL OBSERVATION CARE,LEVL III: CPT | Performed by: HOSPITALIST

## 2019-05-11 PROCEDURE — 36415 COLL VENOUS BLD VENIPUNCTURE: CPT

## 2019-05-11 PROCEDURE — 99285 EMERGENCY DEPT VISIT HI MDM: CPT

## 2019-05-11 PROCEDURE — 96361 HYDRATE IV INFUSION ADD-ON: CPT

## 2019-05-11 PROCEDURE — 10002800 HB RX 250 W HCPCS: Performed by: HOSPITALIST

## 2019-05-11 PROCEDURE — 10002800 HB RX 250 W HCPCS: Performed by: EMERGENCY MEDICINE

## 2019-05-11 PROCEDURE — 10002807 HB RX 258: Performed by: EMERGENCY MEDICINE

## 2019-05-11 PROCEDURE — 96374 THER/PROPH/DIAG INJ IV PUSH: CPT

## 2019-05-11 PROCEDURE — 80053 COMPREHEN METABOLIC PANEL: CPT

## 2019-05-11 PROCEDURE — 74176 CT ABD & PELVIS W/O CONTRAST: CPT

## 2019-05-11 PROCEDURE — 96376 TX/PRO/DX INJ SAME DRUG ADON: CPT

## 2019-05-11 PROCEDURE — 10002803 HB RX 637: Performed by: HOSPITALIST

## 2019-05-11 PROCEDURE — 99285 EMERGENCY DEPT VISIT HI MDM: CPT | Performed by: EMERGENCY MEDICINE

## 2019-05-11 PROCEDURE — 85025 COMPLETE CBC W/AUTO DIFF WBC: CPT

## 2019-05-11 PROCEDURE — 96375 TX/PRO/DX INJ NEW DRUG ADDON: CPT

## 2019-05-11 PROCEDURE — 74176 CT ABD & PELVIS W/O CONTRAST: CPT | Performed by: RADIOLOGY

## 2019-05-11 PROCEDURE — G0378 HOSPITAL OBSERVATION PER HR: HCPCS

## 2019-05-11 RX ORDER — ONDANSETRON 2 MG/ML
4 INJECTION INTRAMUSCULAR; INTRAVENOUS ONCE
Status: COMPLETED | OUTPATIENT
Start: 2019-05-11 | End: 2019-05-11

## 2019-05-11 RX ORDER — ONDANSETRON 2 MG/ML
4 INJECTION INTRAMUSCULAR; INTRAVENOUS 2 TIMES DAILY PRN
Status: DISCONTINUED | OUTPATIENT
Start: 2019-05-11 | End: 2019-05-11

## 2019-05-11 RX ORDER — HYDROMORPHONE HCL IN 0.9% NACL 6 MG/30 ML
PATIENT CONTROLLED ANALGESIA VIAL INTRAVENOUS CONTINUOUS
Status: DISCONTINUED | OUTPATIENT
Start: 2019-05-11 | End: 2019-05-12 | Stop reason: HOSPADM

## 2019-05-11 RX ORDER — NALOXONE HCL 0.4 MG/ML
0.1 VIAL (ML) INJECTION PRN
Status: DISCONTINUED | OUTPATIENT
Start: 2019-05-11 | End: 2019-05-12 | Stop reason: HOSPADM

## 2019-05-11 RX ORDER — DIAZEPAM 5 MG/ML
5 INJECTION, SOLUTION INTRAMUSCULAR; INTRAVENOUS ONCE
Status: COMPLETED | OUTPATIENT
Start: 2019-05-11 | End: 2019-05-11

## 2019-05-11 RX ORDER — 0.9 % SODIUM CHLORIDE 0.9 %
2 VIAL (ML) INJECTION EVERY 12 HOURS SCHEDULED
Status: DISCONTINUED | OUTPATIENT
Start: 2019-05-11 | End: 2019-05-12 | Stop reason: HOSPADM

## 2019-05-11 RX ORDER — TAMSULOSIN HYDROCHLORIDE 0.4 MG/1
0.4 CAPSULE ORAL
Status: DISCONTINUED | OUTPATIENT
Start: 2019-05-11 | End: 2019-05-12 | Stop reason: HOSPADM

## 2019-05-11 RX ORDER — SODIUM CHLORIDE 9 MG/ML
INJECTION, SOLUTION INTRAVENOUS CONTINUOUS
Status: DISCONTINUED | OUTPATIENT
Start: 2019-05-11 | End: 2019-05-12 | Stop reason: HOSPADM

## 2019-05-11 RX ORDER — ACETAMINOPHEN 500 MG
1000 TABLET ORAL EVERY 6 HOURS PRN
COMMUNITY

## 2019-05-11 RX ORDER — 0.9 % SODIUM CHLORIDE 0.9 %
2 VIAL (ML) INJECTION PRN
Status: DISCONTINUED | OUTPATIENT
Start: 2019-05-11 | End: 2019-05-12 | Stop reason: HOSPADM

## 2019-05-11 RX ORDER — SODIUM CHLORIDE 9 MG/ML
INJECTION, SOLUTION INTRAVENOUS CONTINUOUS
Status: ACTIVE | OUTPATIENT
Start: 2019-05-11 | End: 2019-05-12

## 2019-05-11 RX ORDER — PROCHLORPERAZINE EDISYLATE 5 MG/ML
10 INJECTION INTRAMUSCULAR; INTRAVENOUS EVERY 6 HOURS PRN
Status: DISCONTINUED | OUTPATIENT
Start: 2019-05-11 | End: 2019-05-12 | Stop reason: HOSPADM

## 2019-05-11 RX ORDER — METOCLOPRAMIDE HYDROCHLORIDE 5 MG/ML
10 INJECTION INTRAMUSCULAR; INTRAVENOUS EVERY 6 HOURS PRN
Status: DISCONTINUED | OUTPATIENT
Start: 2019-05-11 | End: 2019-05-12 | Stop reason: HOSPADM

## 2019-05-11 RX ORDER — ONDANSETRON 2 MG/ML
4 INJECTION INTRAMUSCULAR; INTRAVENOUS EVERY 8 HOURS PRN
Status: DISCONTINUED | OUTPATIENT
Start: 2019-05-11 | End: 2019-05-12 | Stop reason: HOSPADM

## 2019-05-11 RX ADMIN — HYDROMORPHONE HYDROCHLORIDE 1 MG: 1 INJECTION, SOLUTION INTRAMUSCULAR; INTRAVENOUS; SUBCUTANEOUS at 13:47

## 2019-05-11 RX ADMIN — TAMSULOSIN HYDROCHLORIDE 0.4 MG: 0.4 CAPSULE ORAL at 17:46

## 2019-05-11 RX ADMIN — KETOROLAC TROMETHAMINE 15 MG: 30 INJECTION, SOLUTION INTRAMUSCULAR at 17:47

## 2019-05-11 RX ADMIN — SODIUM CHLORIDE 1000 ML: 9 INJECTION, SOLUTION INTRAVENOUS at 13:03

## 2019-05-11 RX ADMIN — ONDANSETRON 4 MG: 2 INJECTION INTRAMUSCULAR; INTRAVENOUS at 12:15

## 2019-05-11 RX ADMIN — HYDROMORPHONE HYDROCHLORIDE 1 MG: 1 INJECTION, SOLUTION INTRAMUSCULAR; INTRAVENOUS; SUBCUTANEOUS at 15:02

## 2019-05-11 RX ADMIN — HYDROMORPHONE HYDROCHLORIDE 1 MG: 1 INJECTION, SOLUTION INTRAMUSCULAR; INTRAVENOUS; SUBCUTANEOUS at 12:12

## 2019-05-11 RX ADMIN — HYDROMORPHONE HYDROCHLORIDE 1 MG: 1 INJECTION, SOLUTION INTRAMUSCULAR; INTRAVENOUS; SUBCUTANEOUS at 13:01

## 2019-05-11 RX ADMIN — METOCLOPRAMIDE 10 MG: 5 INJECTION, SOLUTION INTRAMUSCULAR; INTRAVENOUS at 20:44

## 2019-05-11 RX ADMIN — SODIUM CHLORIDE: 9 INJECTION, SOLUTION INTRAVENOUS at 20:46

## 2019-05-11 RX ADMIN — PROCHLORPERAZINE EDISYLATE 10 MG: 5 INJECTION INTRAMUSCULAR; INTRAVENOUS at 22:05

## 2019-05-11 RX ADMIN — DIAZEPAM 5 MG: 5 INJECTION, SOLUTION INTRAMUSCULAR; INTRAVENOUS at 14:19

## 2019-05-11 RX ADMIN — ONDANSETRON 4 MG: 2 INJECTION INTRAMUSCULAR; INTRAVENOUS at 16:45

## 2019-05-11 RX ADMIN — HYDROMORPHONE HYDROCHLORIDE: 10 INJECTION INTRAMUSCULAR; INTRAVENOUS; SUBCUTANEOUS at 16:46

## 2019-05-11 RX ADMIN — SODIUM CHLORIDE: 9 INJECTION, SOLUTION INTRAVENOUS at 15:15

## 2019-05-11 RX ADMIN — HYDROMORPHONE HYDROCHLORIDE 0.2 MG: 1 INJECTION, SOLUTION INTRAMUSCULAR; INTRAVENOUS; SUBCUTANEOUS at 16:44

## 2019-05-11 RX ADMIN — KETOROLAC TROMETHAMINE 15 MG: 15 INJECTION, SOLUTION INTRAMUSCULAR; INTRAVENOUS at 13:01

## 2019-05-11 RX ADMIN — KETOROLAC TROMETHAMINE 15 MG: 30 INJECTION, SOLUTION INTRAMUSCULAR at 15:02

## 2019-05-11 RX ADMIN — SODIUM CHLORIDE, PRESERVATIVE FREE 2 ML: 5 INJECTION INTRAVENOUS at 20:46

## 2019-05-11 ASSESSMENT — LIFESTYLE VARIABLES
HOW OFTEN DO YOU HAVE 6 OR MORE DRINKS ON ONE OCCASION: NEVER
HOW OFTEN DO YOU HAVE A DRINK CONTAINING ALCOHOL: MONTHLY OR LESS
AUDIT-C TOTAL SCORE: 1
ALCOHOL_USE_STATUS: NO OR LOW RISK WITH VALIDATED TOOL
HOW MANY STANDARD DRINKS CONTAINING ALCOHOL DO YOU HAVE ON A TYPICAL DAY: 0,1 OR 2

## 2019-05-11 ASSESSMENT — COGNITIVE AND FUNCTIONAL STATUS - GENERAL
ARE YOU DEAF OR DO YOU HAVE SERIOUS DIFFICULTY  HEARING: YES
BECAUSE OF A PHYSICAL, MENTAL, OR EMOTIONAL CONDITION, DO YOU HAVE SERIOUS DIFFICULTY CONCENTRATING, REMEMBERING OR MAKING DECISIONS: NO
ARE YOU BLIND OR DO YOU HAVE SERIOUS DIFFICULTY SEEING, EVEN WHEN WEARING GLASSES: NO
BECAUSE OF A PHYSICAL, MENTAL, OR EMOTIONAL CONDITION, DO YOU HAVE DIFFICULTY DOING ERRANDS ALONE: NO
DO YOU HAVE DIFFICULTY DRESSING OR BATHING: NO
DO YOU HAVE SERIOUS DIFFICULTY WALKING OR CLIMBING STAIRS: NO

## 2019-05-11 ASSESSMENT — ENCOUNTER SYMPTOMS
BACK PAIN: 1
TROUBLE SWALLOWING: 0
NERVOUS/ANXIOUS: 0
NAUSEA: 1
COLOR CHANGE: 0
HEADACHES: 0
ACTIVITY CHANGE: 1
SHORTNESS OF BREATH: 0
COUGH: 0
CONFUSION: 0
ABDOMINAL PAIN: 0
FEVER: 0
DIZZINESS: 0
VOMITING: 0

## 2019-05-11 ASSESSMENT — PAIN SCALES - GENERAL
PAIN_LEVEL_AT_REST: 9
PAIN_LEVEL_AT_REST: 9
PAIN_LEVEL_AT_REST: 7
PAIN_LEVEL_AT_REST: 7
PAINLEVEL_OUTOF10: 10
PAIN_LEVEL_AT_REST: 6

## 2019-05-11 ASSESSMENT — ACTIVITIES OF DAILY LIVING (ADL)
ADL_SHORT_OF_BREATH: NO
CHRONIC_PAIN_PRESENT: NO
ADL_SCORE: 12
RECENT_DECLINE_ADL: NO
ADL_BEFORE_ADMISSION: INDEPENDENT

## 2019-05-12 ENCOUNTER — ANESTHESIA EVENT (OUTPATIENT)
Dept: SURGERY | Age: 54
End: 2019-05-12

## 2019-05-12 ENCOUNTER — APPOINTMENT (OUTPATIENT)
Dept: GENERAL RADIOLOGY | Age: 54
End: 2019-05-12
Attending: UROLOGY

## 2019-05-12 ENCOUNTER — ANESTHESIA (OUTPATIENT)
Dept: SURGERY | Age: 54
End: 2019-05-12

## 2019-05-12 VITALS
SYSTOLIC BLOOD PRESSURE: 132 MMHG | DIASTOLIC BLOOD PRESSURE: 71 MMHG | BODY MASS INDEX: 38.14 KG/M2 | RESPIRATION RATE: 16 BRPM | HEIGHT: 71 IN | OXYGEN SATURATION: 92 % | TEMPERATURE: 98.5 F | HEART RATE: 103 BPM | WEIGHT: 272.4 LBS

## 2019-05-12 LAB
ABO + RH BLD: NORMAL
ANION GAP SERPL CALC-SCNC: 12 MMOL/L (ref 10–20)
APPEARANCE UR: CLEAR
BACTERIA #/AREA URNS HPF: ABNORMAL /HPF
BILIRUB UR QL STRIP: NEGATIVE
BLD GP AB SCN SERPL QL: NEGATIVE
BLOOD BANK CMNT PATIENT-IMP: NORMAL
BUN SERPL-MCNC: 17 MG/DL (ref 6–20)
BUN/CREAT SERPL: 15 (ref 7–25)
CALCIUM SERPL-MCNC: 10 MG/DL (ref 8.4–10.2)
CHLORIDE SERPL-SCNC: 107 MMOL/L (ref 98–107)
CO2 SERPL-SCNC: 27 MMOL/L (ref 21–32)
COLOR UR: ABNORMAL
CREAT SERPL-MCNC: 1.11 MG/DL (ref 0.67–1.17)
CROSSMATCH EXPIRE: NORMAL
ERYTHROCYTE [DISTWIDTH] IN BLOOD: 13.2 % (ref 11–15)
GLUCOSE BLDC GLUCOMTR-MCNC: 107 MG/DL (ref 65–99)
GLUCOSE SERPL-MCNC: 113 MG/DL (ref 65–99)
GLUCOSE UR STRIP-MCNC: NEGATIVE MG/DL
HCT VFR BLD CALC: 41 % (ref 39–51)
HGB BLD-MCNC: 13.3 G/DL (ref 13–17)
HGB UR QL STRIP: ABNORMAL
HYALINE CASTS #/AREA URNS LPF: ABNORMAL /LPF (ref 0–5)
KETONES UR STRIP-MCNC: NEGATIVE MG/DL
LEUKOCYTE ESTERASE UR QL STRIP: ABNORMAL
MCH RBC QN AUTO: 30.4 PG (ref 26–34)
MCHC RBC AUTO-ENTMCNC: 32.4 G/DL (ref 32–36.5)
MCV RBC AUTO: 93.6 FL (ref 78–100)
MUCOUS THREADS URNS QL MICRO: PRESENT
NITRITE UR QL STRIP: NEGATIVE
NRBC BLD MANUAL-RTO: 0 /100 WBC
PH UR STRIP: 6 UNITS (ref 5–7)
PLATELET # BLD: 265 K/MCL (ref 140–450)
POTASSIUM SERPL-SCNC: 4.6 MMOL/L (ref 3.4–5.1)
PROT UR STRIP-MCNC: NEGATIVE MG/DL
RBC # BLD: 4.38 MIL/MCL (ref 4.5–5.9)
RBC #/AREA URNS HPF: ABNORMAL /HPF (ref 0–2)
SODIUM SERPL-SCNC: 141 MMOL/L (ref 135–145)
SP GR UR STRIP: <1.005 (ref 1–1.03)
SPECIMEN SOURCE: ABNORMAL
SQUAMOUS #/AREA URNS HPF: ABNORMAL /HPF (ref 0–5)
UROBILINOGEN UR STRIP-MCNC: 0.2 MG/DL (ref 0–1)
WBC # BLD: 12.3 K/MCL (ref 4.2–11)
WBC #/AREA URNS HPF: ABNORMAL /HPF (ref 0–5)

## 2019-05-12 PROCEDURE — 81001 URINALYSIS AUTO W/SCOPE: CPT

## 2019-05-12 PROCEDURE — 99244 OFF/OP CNSLTJ NEW/EST MOD 40: CPT | Performed by: UROLOGY

## 2019-05-12 PROCEDURE — 10002800 HB RX 250 W HCPCS: Performed by: ANESTHESIOLOGY

## 2019-05-12 PROCEDURE — 86901 BLOOD TYPING SEROLOGIC RH(D): CPT

## 2019-05-12 PROCEDURE — 80048 BASIC METABOLIC PNL TOTAL CA: CPT

## 2019-05-12 PROCEDURE — 82962 GLUCOSE BLOOD TEST: CPT

## 2019-05-12 PROCEDURE — C1769 GUIDE WIRE: HCPCS | Performed by: UROLOGY

## 2019-05-12 PROCEDURE — 10002359 HB ANESTH GENERAL ADD'L 1/2 HR: Performed by: UROLOGY

## 2019-05-12 PROCEDURE — 10002358 HB ANESTH GENERAL 1ST 1/2 HR: Performed by: UROLOGY

## 2019-05-12 PROCEDURE — 10002801 HB RX 250 W/O HCPCS: Performed by: ANESTHESIOLOGY

## 2019-05-12 PROCEDURE — 85027 COMPLETE CBC AUTOMATED: CPT

## 2019-05-12 PROCEDURE — G0378 HOSPITAL OBSERVATION PER HR: HCPCS

## 2019-05-12 PROCEDURE — 52356 CYSTO/URETERO W/LITHOTRIPSY: CPT | Performed by: UROLOGY

## 2019-05-12 PROCEDURE — 10002803 HB RX 637: Performed by: ANESTHESIOLOGY

## 2019-05-12 PROCEDURE — 10003056 HB DISPOSABLE INSTRUMENT/SUPPLY 1: Performed by: UROLOGY

## 2019-05-12 PROCEDURE — 10002803 HB RX 637: Performed by: UROLOGY

## 2019-05-12 PROCEDURE — 82365 CALCULUS SPECTROSCOPY: CPT

## 2019-05-12 PROCEDURE — 99217 OBSERVATION CARE DISCHARGE: CPT | Performed by: HOSPITALIST

## 2019-05-12 PROCEDURE — 87086 URINE CULTURE/COLONY COUNT: CPT

## 2019-05-12 PROCEDURE — 52356 CYSTO/URETERO W/LITHOTRIPSY: CPT | Performed by: ANESTHESIOLOGY

## 2019-05-12 PROCEDURE — 96376 TX/PRO/DX INJ SAME DRUG ADON: CPT

## 2019-05-12 PROCEDURE — 10002807 HB RX 258: Performed by: EMERGENCY MEDICINE

## 2019-05-12 PROCEDURE — 10002800 HB RX 250 W HCPCS: Performed by: HOSPITALIST

## 2019-05-12 PROCEDURE — 10001458: Performed by: UROLOGY

## 2019-05-12 PROCEDURE — 10002807 HB RX 258: Performed by: ANESTHESIOLOGY

## 2019-05-12 PROCEDURE — 36415 COLL VENOUS BLD VENIPUNCTURE: CPT

## 2019-05-12 PROCEDURE — 10002801 HB RX 250 W/O HCPCS: Performed by: UROLOGY

## 2019-05-12 PROCEDURE — 74430 CONTRAST X-RAY BLADDER: CPT

## 2019-05-12 PROCEDURE — C1875 STENT, COATED/COV W/O DEL SY: HCPCS | Performed by: UROLOGY

## 2019-05-12 PROCEDURE — 10004451 HB PACU RECOVERY 1ST 30 MINUTES: Performed by: UROLOGY

## 2019-05-12 PROCEDURE — 10002117 HB ADDITIONAL SURGERY TIME/30 MIN: Performed by: UROLOGY

## 2019-05-12 DEVICE — MMIS - STENT ASCERTA 6FR 28CM URET: Type: IMPLANTABLE DEVICE | Site: URETER | Status: FUNCTIONAL

## 2019-05-12 RX ORDER — ONDANSETRON 4 MG/1
4 TABLET, ORALLY DISINTEGRATING ORAL ONCE
Status: COMPLETED | OUTPATIENT
Start: 2019-05-12 | End: 2019-05-12

## 2019-05-12 RX ORDER — KETAMINE HYDROCHLORIDE 50 MG/ML
INJECTION, SOLUTION, CONCENTRATE INTRAMUSCULAR; INTRAVENOUS PRN
Status: DISCONTINUED | OUTPATIENT
Start: 2019-05-12 | End: 2019-05-12

## 2019-05-12 RX ORDER — PROCHLORPERAZINE EDISYLATE 5 MG/ML
5 INJECTION INTRAMUSCULAR; INTRAVENOUS EVERY 4 HOURS PRN
Status: DISCONTINUED | OUTPATIENT
Start: 2019-05-12 | End: 2019-05-12 | Stop reason: HOSPADM

## 2019-05-12 RX ORDER — METOCLOPRAMIDE 10 MG/1
10 TABLET ORAL ONCE
Status: COMPLETED | OUTPATIENT
Start: 2019-05-12 | End: 2019-05-12

## 2019-05-12 RX ORDER — MIDAZOLAM HYDROCHLORIDE 1 MG/ML
INJECTION, SOLUTION INTRAMUSCULAR; INTRAVENOUS PRN
Status: DISCONTINUED | OUTPATIENT
Start: 2019-05-12 | End: 2019-05-12

## 2019-05-12 RX ORDER — FAMOTIDINE 20 MG/1
20 TABLET, FILM COATED ORAL ONCE
Status: COMPLETED | OUTPATIENT
Start: 2019-05-12 | End: 2019-05-12

## 2019-05-12 RX ORDER — SODIUM CHLORIDE, SODIUM LACTATE, POTASSIUM CHLORIDE, CALCIUM CHLORIDE 600; 310; 30; 20 MG/100ML; MG/100ML; MG/100ML; MG/100ML
INJECTION, SOLUTION INTRAVENOUS CONTINUOUS PRN
Status: DISCONTINUED | OUTPATIENT
Start: 2019-05-12 | End: 2019-05-12

## 2019-05-12 RX ORDER — TAMSULOSIN HYDROCHLORIDE 0.4 MG/1
0.4 CAPSULE ORAL
Qty: 14 CAPSULE | Refills: 0 | Status: SHIPPED | OUTPATIENT
Start: 2019-05-12 | End: 2020-07-28 | Stop reason: ALTCHOICE

## 2019-05-12 RX ORDER — PROPOFOL 10 MG/ML
INJECTION, EMULSION INTRAVENOUS PRN
Status: DISCONTINUED | OUTPATIENT
Start: 2019-05-12 | End: 2019-05-12

## 2019-05-12 RX ORDER — ONDANSETRON 2 MG/ML
4 INJECTION INTRAMUSCULAR; INTRAVENOUS 2 TIMES DAILY PRN
Status: DISCONTINUED | OUTPATIENT
Start: 2019-05-12 | End: 2019-05-12 | Stop reason: HOSPADM

## 2019-05-12 RX ORDER — LIDOCAINE HYDROCHLORIDE 10 MG/ML
INJECTION, SOLUTION INFILTRATION; PERINEURAL PRN
Status: DISCONTINUED | OUTPATIENT
Start: 2019-05-12 | End: 2019-05-12

## 2019-05-12 RX ORDER — ROCURONIUM BROMIDE 10 MG/ML
INJECTION, SOLUTION INTRAVENOUS PRN
Status: DISCONTINUED | OUTPATIENT
Start: 2019-05-12 | End: 2019-05-12

## 2019-05-12 RX ORDER — HYDROCODONE BITARTRATE AND ACETAMINOPHEN 5; 325 MG/1; MG/1
1 TABLET ORAL EVERY 4 HOURS PRN
Status: DISCONTINUED | OUTPATIENT
Start: 2019-05-12 | End: 2019-05-12 | Stop reason: HOSPADM

## 2019-05-12 RX ORDER — DEXAMETHASONE SODIUM PHOSPHATE 4 MG/ML
INJECTION, SOLUTION INTRA-ARTICULAR; INTRALESIONAL; INTRAMUSCULAR; INTRAVENOUS; SOFT TISSUE PRN
Status: DISCONTINUED | OUTPATIENT
Start: 2019-05-12 | End: 2019-05-12

## 2019-05-12 RX ORDER — AMOXICILLIN 250 MG
1 CAPSULE ORAL DAILY
Qty: 30 TABLET | Refills: 0 | Status: SHIPPED | OUTPATIENT
Start: 2019-05-12 | End: 2020-07-28 | Stop reason: ALTCHOICE

## 2019-05-12 RX ORDER — DEXAMETHASONE SODIUM PHOSPHATE 4 MG/ML
4 INJECTION, SOLUTION INTRA-ARTICULAR; INTRALESIONAL; INTRAMUSCULAR; INTRAVENOUS; SOFT TISSUE
Status: DISCONTINUED | OUTPATIENT
Start: 2019-05-12 | End: 2019-05-12 | Stop reason: HOSPADM

## 2019-05-12 RX ORDER — HYDROCODONE BITARTRATE AND ACETAMINOPHEN 5; 325 MG/1; MG/1
1-2 TABLET ORAL EVERY 6 HOURS PRN
Qty: 18 TABLET | Refills: 0 | Status: SHIPPED | OUTPATIENT
Start: 2019-05-12 | End: 2020-07-28 | Stop reason: ALTCHOICE

## 2019-05-12 RX ORDER — CEFAZOLIN SODIUM 1 G/3ML
INJECTION, POWDER, FOR SOLUTION INTRAMUSCULAR; INTRAVENOUS PRN
Status: DISCONTINUED | OUTPATIENT
Start: 2019-05-12 | End: 2019-05-12

## 2019-05-12 RX ORDER — METOCLOPRAMIDE HYDROCHLORIDE 5 MG/ML
5 INJECTION INTRAMUSCULAR; INTRAVENOUS EVERY 6 HOURS PRN
Status: DISCONTINUED | OUTPATIENT
Start: 2019-05-12 | End: 2019-05-12 | Stop reason: ALTCHOICE

## 2019-05-12 RX ADMIN — FAMOTIDINE 20 MG: 20 TABLET, FILM COATED ORAL at 06:13

## 2019-05-12 RX ADMIN — KETAMINE HYDROCHLORIDE 50 MG: 50 INJECTION, SOLUTION INTRAMUSCULAR; INTRAVENOUS at 08:05

## 2019-05-12 RX ADMIN — FENTANYL CITRATE 50 MCG: 50 INJECTION INTRAMUSCULAR; INTRAVENOUS at 07:58

## 2019-05-12 RX ADMIN — FENTANYL CITRATE 100 MCG: 50 INJECTION INTRAMUSCULAR; INTRAVENOUS at 08:16

## 2019-05-12 RX ADMIN — CEFAZOLIN SODIUM 2 G: 1 INJECTION, POWDER, FOR SOLUTION INTRAMUSCULAR; INTRAVENOUS at 08:00

## 2019-05-12 RX ADMIN — SODIUM CHLORIDE, POTASSIUM CHLORIDE, SODIUM LACTATE AND CALCIUM CHLORIDE: 600; 310; 30; 20 INJECTION, SOLUTION INTRAVENOUS at 07:58

## 2019-05-12 RX ADMIN — ONDANSETRON 4 MG: 4 TABLET, ORALLY DISINTEGRATING ORAL at 06:13

## 2019-05-12 RX ADMIN — SUGAMMADEX 200 MG: 100 INJECTION, SOLUTION INTRAVENOUS at 08:44

## 2019-05-12 RX ADMIN — SODIUM CHLORIDE: 9 INJECTION, SOLUTION INTRAVENOUS at 03:50

## 2019-05-12 RX ADMIN — ROCURONIUM BROMIDE 70 MG: 10 INJECTION INTRAVENOUS at 08:06

## 2019-05-12 RX ADMIN — DEXAMETHASONE SODIUM PHOSPHATE 8 MG: 4 INJECTION, SOLUTION INTRAMUSCULAR; INTRAVENOUS at 08:06

## 2019-05-12 RX ADMIN — HYDROCODONE BITARTRATE AND ACETAMINOPHEN 1 TABLET: 5; 325 TABLET ORAL at 10:29

## 2019-05-12 RX ADMIN — KETOROLAC TROMETHAMINE 15 MG: 30 INJECTION, SOLUTION INTRAMUSCULAR at 06:07

## 2019-05-12 RX ADMIN — MIDAZOLAM HYDROCHLORIDE 2 MG: 1 INJECTION, SOLUTION INTRAMUSCULAR; INTRAVENOUS at 07:58

## 2019-05-12 RX ADMIN — LIDOCAINE HYDROCHLORIDE 4 ML: 10 INJECTION, SOLUTION INFILTRATION; PERINEURAL at 08:06

## 2019-05-12 RX ADMIN — METOCLOPRAMIDE 10 MG: 10 TABLET ORAL at 06:12

## 2019-05-12 RX ADMIN — KETOROLAC TROMETHAMINE 15 MG: 30 INJECTION, SOLUTION INTRAMUSCULAR at 00:33

## 2019-05-12 RX ADMIN — PROPOFOL 200 MG: 10 INJECTION, EMULSION INTRAVENOUS at 08:06

## 2019-05-12 RX ADMIN — FENTANYL CITRATE 50 MCG: 50 INJECTION INTRAMUSCULAR; INTRAVENOUS at 08:25

## 2019-05-12 ASSESSMENT — PAIN SCALES - GENERAL
PAIN_LEVEL_AT_REST: 2
PAIN_LEVEL_AT_REST: 0
PAIN_LEVEL_AT_REST: 2
PAIN_LEVEL_AT_REST: 5
PAIN_LEVEL_AT_REST: 0
PAIN_LEVEL_AT_REST: 6
PAIN_LEVEL_AT_REST: 3
PAIN_LEVEL_AT_REST: 3
PAIN_LEVEL_AT_REST: 6
PAIN_LEVEL_AT_REST: 2
PAIN_LEVEL_AT_REST: 5
PAIN_LEVEL_AT_REST: 5

## 2019-05-12 ASSESSMENT — ENCOUNTER SYMPTOMS: EXERCISE TOLERANCE: GOOD

## 2019-05-13 LAB
BACTERIA UR CULT: NORMAL
REPORT STATUS (RPT): NORMAL
SPECIMEN SOURCE: NORMAL

## 2019-05-13 RX ORDER — LIDOCAINE HYDROCHLORIDE 20 MG/ML
JELLY TOPICAL PRN
Status: SHIPPED | OUTPATIENT
Start: 2019-05-12

## 2019-05-16 LAB
COLOR STONE: NORMAL
COMPN STONE: NORMAL
SIZE STONE: NORMAL MM3
STONE ANALYSIS-IMP: NORMAL
STONE ANALYSIS-IMP: NORMAL

## 2019-11-13 ENCOUNTER — CLINICAL ABSTRACT (OUTPATIENT)
Dept: INFUSION THERAPY | Age: 54
End: 2019-11-13

## 2019-12-15 ENCOUNTER — HOSPITAL ENCOUNTER (EMERGENCY)
Age: 54
Discharge: HOME | End: 2019-12-15

## 2019-12-15 ENCOUNTER — APPOINTMENT (OUTPATIENT)
Dept: CT IMAGING | Age: 54
End: 2019-12-15
Attending: PHYSICIAN ASSISTANT

## 2019-12-15 VITALS
BODY MASS INDEX: 38.22 KG/M2 | DIASTOLIC BLOOD PRESSURE: 84 MMHG | HEART RATE: 98 BPM | WEIGHT: 267 LBS | RESPIRATION RATE: 20 BRPM | HEIGHT: 70 IN | OXYGEN SATURATION: 97 % | SYSTOLIC BLOOD PRESSURE: 129 MMHG | TEMPERATURE: 97.9 F

## 2019-12-15 DIAGNOSIS — N20.0 NEPHROLITHIASIS: Primary | ICD-10-CM

## 2019-12-15 DIAGNOSIS — N23 RENAL COLIC ON RIGHT SIDE: ICD-10-CM

## 2019-12-15 LAB
ALBUMIN SERPL-MCNC: 4.1 G/DL (ref 3.6–5.1)
ALBUMIN/GLOB SERPL: 1.1 {RATIO} (ref 1–2.4)
ALP SERPL-CCNC: 103 UNITS/L (ref 45–117)
ALT SERPL-CCNC: 63 UNITS/L
ANION GAP SERPL CALC-SCNC: 9 MMOL/L (ref 10–20)
APPEARANCE UR: CLEAR
APPEARANCE UR: CLEAR
AST SERPL-CCNC: 32 UNITS/L
BACTERIA #/AREA URNS HPF: ABNORMAL /HPF
BASOPHILS # BLD AUTO: 0.1 K/MCL (ref 0–0.3)
BASOPHILS NFR BLD AUTO: 1 %
BILIRUB SERPL-MCNC: 0.5 MG/DL (ref 0.2–1)
BILIRUB UR QL STRIP: NEGATIVE
BILIRUB UR QL STRIP: NEGATIVE
BUN SERPL-MCNC: 13 MG/DL (ref 6–20)
BUN/CREAT SERPL: 13 (ref 7–25)
CALCIUM SERPL-MCNC: 10.9 MG/DL (ref 8.4–10.2)
CHLORIDE SERPL-SCNC: 102 MMOL/L (ref 98–107)
CO2 SERPL-SCNC: 34 MMOL/L (ref 21–32)
COLOR UR: COLORLESS
COLOR UR: YELLOW
CREAT SERPL-MCNC: 0.98 MG/DL (ref 0.67–1.17)
DIFFERENTIAL METHOD BLD: ABNORMAL
EOSINOPHIL # BLD AUTO: 0.1 K/MCL (ref 0.1–0.5)
EOSINOPHIL NFR SPEC: 1 %
ERYTHROCYTE [DISTWIDTH] IN BLOOD: 13.1 % (ref 11–15)
GLOBULIN SER-MCNC: 3.8 G/DL (ref 2–4)
GLUCOSE SERPL-MCNC: 87 MG/DL (ref 65–99)
GLUCOSE UR STRIP-MCNC: NEGATIVE MG/DL
GLUCOSE UR STRIP-MCNC: NEGATIVE MG/DL
HCT VFR BLD CALC: 52.8 % (ref 39–51)
HGB BLD-MCNC: 17.2 G/DL (ref 13–17)
HGB UR QL STRIP: ABNORMAL
HGB UR QL STRIP: ABNORMAL
HYALINE CASTS #/AREA URNS LPF: ABNORMAL /LPF (ref 0–5)
IMM GRANULOCYTES # BLD AUTO: 0.2 K/MCL (ref 0–0.2)
IMM GRANULOCYTES NFR BLD: 1 %
KETONES UR STRIP-MCNC: NEGATIVE MG/DL
KETONES UR STRIP-MCNC: NEGATIVE MG/DL
LEUKOCYTE ESTERASE UR QL STRIP: NEGATIVE
LEUKOCYTE ESTERASE UR QL STRIP: NEGATIVE
LIPASE SERPL-CCNC: 112 UNITS/L (ref 73–393)
LYMPHOCYTES # BLD MANUAL: 1.7 K/MCL (ref 1–4)
LYMPHOCYTES NFR BLD MANUAL: 16 %
MCH RBC QN AUTO: 30.3 PG (ref 26–34)
MCHC RBC AUTO-ENTMCNC: 32.6 G/DL (ref 32–36.5)
MCV RBC AUTO: 93 FL (ref 78–100)
MONOCYTES # BLD MANUAL: 1.4 K/MCL (ref 0.3–0.9)
MONOCYTES NFR BLD MANUAL: 13 %
NEUTROPHILS # BLD: 7.1 K/MCL (ref 1.8–7.7)
NEUTROPHILS NFR BLD AUTO: 68 %
NITRITE UR QL STRIP: NEGATIVE
NITRITE UR QL STRIP: NEGATIVE
NRBC BLD MANUAL-RTO: 0 /100 WBC
PH UR STRIP: 5.5 UNITS (ref 5–7)
PH UR STRIP: 6 UNITS (ref 5–7)
PLATELET # BLD: 309 K/MCL (ref 140–450)
POTASSIUM SERPL-SCNC: 3.4 MMOL/L (ref 3.4–5.1)
PROT SERPL-MCNC: 7.9 G/DL (ref 6.4–8.2)
PROT UR STRIP-MCNC: NEGATIVE MG/DL
PROT UR STRIP-MCNC: NEGATIVE MG/DL
RBC # BLD: 5.68 MIL/MCL (ref 4.5–5.9)
RBC #/AREA URNS HPF: ABNORMAL /HPF (ref 0–2)
SODIUM SERPL-SCNC: 142 MMOL/L (ref 135–145)
SP GR UR STRIP: <1.005 (ref 1–1.03)
SP GR UR STRIP: <1.005 (ref 1–1.03)
SPECIMEN SOURCE: ABNORMAL
SQUAMOUS #/AREA URNS HPF: ABNORMAL /HPF (ref 0–5)
UROBILINOGEN UR STRIP-MCNC: 0.2 MG/DL (ref 0–1)
UROBILINOGEN UR STRIP-MCNC: 0.2 MG/DL (ref 0–1)
WBC # BLD: 10.5 K/MCL (ref 4.2–11)
WBC #/AREA URNS HPF: ABNORMAL /HPF (ref 0–5)

## 2019-12-15 PROCEDURE — 81001 URINALYSIS AUTO W/SCOPE: CPT

## 2019-12-15 PROCEDURE — 80053 COMPREHEN METABOLIC PANEL: CPT

## 2019-12-15 PROCEDURE — 85025 COMPLETE CBC W/AUTO DIFF WBC: CPT

## 2019-12-15 PROCEDURE — 96361 HYDRATE IV INFUSION ADD-ON: CPT

## 2019-12-15 PROCEDURE — 10002807 HB RX 258: Performed by: PHYSICIAN ASSISTANT

## 2019-12-15 PROCEDURE — 96375 TX/PRO/DX INJ NEW DRUG ADDON: CPT

## 2019-12-15 PROCEDURE — 96374 THER/PROPH/DIAG INJ IV PUSH: CPT

## 2019-12-15 PROCEDURE — 74176 CT ABD & PELVIS W/O CONTRAST: CPT

## 2019-12-15 PROCEDURE — 83690 ASSAY OF LIPASE: CPT

## 2019-12-15 PROCEDURE — 10002800 HB RX 250 W HCPCS: Performed by: PHYSICIAN ASSISTANT

## 2019-12-15 PROCEDURE — 74176 CT ABD & PELVIS W/O CONTRAST: CPT | Performed by: RADIOLOGY

## 2019-12-15 PROCEDURE — 99284 EMERGENCY DEPT VISIT MOD MDM: CPT | Performed by: PHYSICIAN ASSISTANT

## 2019-12-15 PROCEDURE — 99284 EMERGENCY DEPT VISIT MOD MDM: CPT

## 2019-12-15 RX ORDER — ONDANSETRON 2 MG/ML
4 INJECTION INTRAMUSCULAR; INTRAVENOUS ONCE
Status: COMPLETED | OUTPATIENT
Start: 2019-12-15 | End: 2019-12-15

## 2019-12-15 RX ADMIN — ONDANSETRON 4 MG: 2 INJECTION INTRAMUSCULAR; INTRAVENOUS at 21:11

## 2019-12-15 RX ADMIN — SODIUM CHLORIDE 1000 ML: 9 INJECTION, SOLUTION INTRAVENOUS at 21:11

## 2019-12-15 RX ADMIN — KETOROLAC TROMETHAMINE 30 MG: 30 INJECTION, SOLUTION INTRAMUSCULAR at 21:13

## 2019-12-15 SDOH — HEALTH STABILITY: MENTAL HEALTH: HOW OFTEN DO YOU HAVE A DRINK CONTAINING ALCOHOL?: MONTHLY OR LESS

## 2019-12-15 SDOH — HEALTH STABILITY: MENTAL HEALTH: HOW MANY STANDARD DRINKS CONTAINING ALCOHOL DO YOU HAVE ON A TYPICAL DAY?: 1 OR 2

## 2019-12-15 SDOH — HEALTH STABILITY: MENTAL HEALTH: HOW OFTEN DO YOU HAVE 6 OR MORE DRINKS ON ONE OCCASION?: NEVER

## 2019-12-15 ASSESSMENT — ENCOUNTER SYMPTOMS
NERVOUS/ANXIOUS: 0
VOMITING: 1
ABDOMINAL PAIN: 1
FATIGUE: 0
BACK PAIN: 0
CONFUSION: 0
NAUSEA: 1
WEAKNESS: 0
SHORTNESS OF BREATH: 0
DIARRHEA: 0
FEVER: 0
ACTIVITY CHANGE: 1
CONSTIPATION: 0
COLOR CHANGE: 0
LIGHT-HEADEDNESS: 0
SORE THROAT: 0
CHILLS: 0
HEADACHES: 0
CHEST TIGHTNESS: 0
APPETITE CHANGE: 1

## 2019-12-15 ASSESSMENT — PAIN SCALES - GENERAL: PAINLEVEL_OUTOF10: 6

## 2020-01-01 ENCOUNTER — EXTERNAL RECORD (OUTPATIENT)
Dept: OTHER | Age: 55
End: 2020-01-01

## 2020-07-28 ENCOUNTER — HOSPITAL ENCOUNTER (OUTPATIENT)
Age: 55
Setting detail: OBSERVATION
Discharge: HOME | End: 2020-08-01
Attending: EMERGENCY MEDICINE | Admitting: HOSPITALIST

## 2020-07-28 ENCOUNTER — APPOINTMENT (OUTPATIENT)
Dept: CT IMAGING | Age: 55
End: 2020-07-28
Attending: EMERGENCY MEDICINE

## 2020-07-28 ENCOUNTER — APPOINTMENT (OUTPATIENT)
Dept: ULTRASOUND IMAGING | Age: 55
End: 2020-07-28
Attending: EMERGENCY MEDICINE

## 2020-07-28 DIAGNOSIS — K76.0 FATTY LIVER: ICD-10-CM

## 2020-07-28 DIAGNOSIS — R11.2 NAUSEA AND VOMITING IN ADULT: ICD-10-CM

## 2020-07-28 DIAGNOSIS — D72.829 LEUKOCYTOSIS, UNSPECIFIED TYPE: ICD-10-CM

## 2020-07-28 DIAGNOSIS — R10.13 ABDOMINAL PAIN, ACUTE, EPIGASTRIC: Primary | ICD-10-CM

## 2020-07-28 DIAGNOSIS — E83.52 HYPERCALCEMIA: ICD-10-CM

## 2020-07-28 LAB
ALBUMIN SERPL-MCNC: 4.3 G/DL (ref 3.6–5.1)
ALBUMIN/GLOB SERPL: 1.2 {RATIO} (ref 1–2.4)
ALP SERPL-CCNC: 106 UNITS/L (ref 45–117)
ALT SERPL-CCNC: 46 UNITS/L
ANION GAP SERPL CALC-SCNC: 12 MMOL/L (ref 10–20)
APPEARANCE UR: CLEAR
AST SERPL-CCNC: 22 UNITS/L
ATRIAL RATE (BPM): 101
BASOPHILS # BLD: 0.1 K/MCL (ref 0–0.3)
BASOPHILS NFR BLD: 1 %
BILIRUB SERPL-MCNC: 1 MG/DL (ref 0.2–1)
BILIRUB UR QL STRIP: NEGATIVE
BUN SERPL-MCNC: 13 MG/DL (ref 6–20)
BUN/CREAT SERPL: 13 (ref 7–25)
CALCIUM SERPL-MCNC: 11.5 MG/DL (ref 8.4–10.2)
CHLORIDE SERPL-SCNC: 101 MMOL/L (ref 98–107)
CO2 SERPL-SCNC: 31 MMOL/L (ref 21–32)
COLOR UR: YELLOW
CREAT SERPL-MCNC: 0.98 MG/DL (ref 0.67–1.17)
CREAT SERPL-MCNC: 1 MG/DL (ref 0.67–1.17)
DEPRECATED RDW RBC: 45.7 FL (ref 39–50)
DIASTOLIC BLOOD PRESSURE: 94
EOSINOPHIL # BLD: 0.1 K/MCL (ref 0.1–0.5)
EOSINOPHIL NFR BLD: 1 %
ERYTHROCYTE [DISTWIDTH] IN BLOOD: 13.4 % (ref 11–15)
FASTING DURATION TIME PATIENT: ABNORMAL H
GFR SERPLBLD BASED ON 1.73 SQ M-ARVRAT: 85 ML/MIN/1.73M2
GFR SERPLBLD BASED ON 1.73 SQ M-ARVRAT: 87 ML/MIN/1.73M2
GLOBULIN SER-MCNC: 3.6 G/DL (ref 2–4)
GLUCOSE SERPL-MCNC: 112 MG/DL (ref 65–99)
GLUCOSE UR STRIP-MCNC: NEGATIVE MG/DL
HCT VFR BLD CALC: 53.8 % (ref 39–51)
HGB BLD-MCNC: 17.2 G/DL (ref 13–17)
HGB UR QL STRIP: NEGATIVE
IMM GRANULOCYTES # BLD AUTO: 0.2 K/MCL (ref 0–0.2)
IMM GRANULOCYTES # BLD: 1 %
KETONES UR STRIP-MCNC: NEGATIVE MG/DL
LACTATE BLDV-SCNC: 0.9 MMOL/L (ref 0–2)
LEUKOCYTE ESTERASE UR QL STRIP: NEGATIVE
LIPASE SERPL-CCNC: 132 UNITS/L (ref 73–393)
LYMPHOCYTES # BLD: 1.2 K/MCL (ref 1–4)
LYMPHOCYTES NFR BLD: 7 %
MCH RBC QN AUTO: 29.7 PG (ref 26–34)
MCHC RBC AUTO-ENTMCNC: 32 G/DL (ref 32–36.5)
MCV RBC AUTO: 92.8 FL (ref 78–100)
MONOCYTES # BLD: 1.8 K/MCL (ref 0.3–0.9)
MONOCYTES NFR BLD: 10 %
NEUTROPHILS # BLD: 14.4 K/MCL (ref 1.8–7.7)
NEUTROPHILS NFR BLD: 80 %
NITRITE UR QL STRIP: NEGATIVE
NRBC BLD MANUAL-RTO: 0 /100 WBC
P AXIS (DEGREES): 54
PH UR STRIP: 7 UNITS (ref 5–7)
PLATELET # BLD AUTO: 297 K/MCL (ref 140–450)
POTASSIUM SERPL-SCNC: 4.7 MMOL/L (ref 3.4–5.1)
PR-INTERVAL (MSEC): 172
PROT SERPL-MCNC: 7.9 G/DL (ref 6.4–8.2)
PROT UR STRIP-MCNC: NEGATIVE MG/DL
QRS-INTERVAL (MSEC): 78
QT-INTERVAL (MSEC): 324
QTC: 420
R AXIS (DEGREES): 52
RAINBOW EXTRA TUBES HOLD SPECIMEN: NORMAL
RAINBOW EXTRA TUBES HOLD SPECIMEN: NORMAL
RBC # BLD: 5.8 MIL/MCL (ref 4.5–5.9)
REPORT TEXT: NORMAL
SODIUM SERPL-SCNC: 139 MMOL/L (ref 135–145)
SP GR UR STRIP: 1.01 (ref 1–1.03)
SYSTOLIC BLOOD PRESSURE: 144
T AXIS (DEGREES): 21
TROPONIN I SERPL HS-MCNC: <0.02 NG/ML
UROBILINOGEN UR STRIP-MCNC: 0.2 MG/DL
VENTRICULAR RATE EKG/MIN (BPM): 101
WBC # BLD: 17.7 K/MCL (ref 4.2–11)

## 2020-07-28 PROCEDURE — 93010 ELECTROCARDIOGRAM REPORT: CPT | Performed by: INTERNAL MEDICINE

## 2020-07-28 PROCEDURE — 84484 ASSAY OF TROPONIN QUANT: CPT | Performed by: EMERGENCY MEDICINE

## 2020-07-28 PROCEDURE — 93005 ELECTROCARDIOGRAM TRACING: CPT | Performed by: EMERGENCY MEDICINE

## 2020-07-28 PROCEDURE — 83690 ASSAY OF LIPASE: CPT | Performed by: EMERGENCY MEDICINE

## 2020-07-28 PROCEDURE — 36415 COLL VENOUS BLD VENIPUNCTURE: CPT | Performed by: EMERGENCY MEDICINE

## 2020-07-28 PROCEDURE — 85025 COMPLETE CBC W/AUTO DIFF WBC: CPT | Performed by: EMERGENCY MEDICINE

## 2020-07-28 PROCEDURE — 99285 EMERGENCY DEPT VISIT HI MDM: CPT

## 2020-07-28 PROCEDURE — 74177 CT ABD & PELVIS W/CONTRAST: CPT | Performed by: RADIOLOGY

## 2020-07-28 PROCEDURE — 10002805 HB CONTRAST AGENT: Performed by: RADIOLOGY

## 2020-07-28 PROCEDURE — 10002800 HB RX 250 W HCPCS: Performed by: HOSPITALIST

## 2020-07-28 PROCEDURE — 74177 CT ABD & PELVIS W/CONTRAST: CPT

## 2020-07-28 PROCEDURE — 80053 COMPREHEN METABOLIC PANEL: CPT | Performed by: EMERGENCY MEDICINE

## 2020-07-28 PROCEDURE — 82306 VITAMIN D 25 HYDROXY: CPT | Performed by: HOSPITALIST

## 2020-07-28 PROCEDURE — 83605 ASSAY OF LACTIC ACID: CPT | Performed by: EMERGENCY MEDICINE

## 2020-07-28 PROCEDURE — 10002807 HB RX 258: Performed by: EMERGENCY MEDICINE

## 2020-07-28 PROCEDURE — 96361 HYDRATE IV INFUSION ADD-ON: CPT

## 2020-07-28 PROCEDURE — 10002800 HB RX 250 W HCPCS: Performed by: EMERGENCY MEDICINE

## 2020-07-28 PROCEDURE — 10002803 HB RX 637: Performed by: PHYSICIAN ASSISTANT

## 2020-07-28 PROCEDURE — 96374 THER/PROPH/DIAG INJ IV PUSH: CPT

## 2020-07-28 PROCEDURE — 10002807 HB RX 258: Performed by: HOSPITALIST

## 2020-07-28 PROCEDURE — 10002807 HB RX 258: Performed by: RADIOLOGY

## 2020-07-28 PROCEDURE — 81003 URINALYSIS AUTO W/O SCOPE: CPT

## 2020-07-28 PROCEDURE — 96376 TX/PRO/DX INJ SAME DRUG ADON: CPT

## 2020-07-28 PROCEDURE — 96375 TX/PRO/DX INJ NEW DRUG ADDON: CPT

## 2020-07-28 PROCEDURE — 76705 ECHO EXAM OF ABDOMEN: CPT

## 2020-07-28 PROCEDURE — 10004651 HB RX, NO CHARGE ITEM: Performed by: RADIOLOGY

## 2020-07-28 PROCEDURE — C9113 INJ PANTOPRAZOLE SODIUM, VIA: HCPCS | Performed by: EMERGENCY MEDICINE

## 2020-07-28 PROCEDURE — 76705 ECHO EXAM OF ABDOMEN: CPT | Performed by: RADIOLOGY

## 2020-07-28 PROCEDURE — 87040 BLOOD CULTURE FOR BACTERIA: CPT | Performed by: EMERGENCY MEDICINE

## 2020-07-28 PROCEDURE — G0378 HOSPITAL OBSERVATION PER HR: HCPCS

## 2020-07-28 PROCEDURE — 87635 SARS-COV-2 COVID-19 AMP PRB: CPT | Performed by: EMERGENCY MEDICINE

## 2020-07-28 PROCEDURE — 99220 INITIAL OBSERVATION CARE,LEVL III: CPT | Performed by: HOSPITALIST

## 2020-07-28 PROCEDURE — C9113 INJ PANTOPRAZOLE SODIUM, VIA: HCPCS | Performed by: HOSPITALIST

## 2020-07-28 PROCEDURE — 82565 ASSAY OF CREATININE: CPT

## 2020-07-28 PROCEDURE — 99220 INITIAL OBSERVATION CARE,LEVL III: CPT | Performed by: INTERNAL MEDICINE

## 2020-07-28 PROCEDURE — 10004651 HB RX, NO CHARGE ITEM: Performed by: EMERGENCY MEDICINE

## 2020-07-28 PROCEDURE — 10002801 HB RX 250 W/O HCPCS: Performed by: HOSPITALIST

## 2020-07-28 RX ORDER — MAGNESIUM SULFATE 1 G/100ML
1 INJECTION INTRAVENOUS DAILY PRN
Status: DISCONTINUED | OUTPATIENT
Start: 2020-07-28 | End: 2020-08-01 | Stop reason: HOSPADM

## 2020-07-28 RX ORDER — ONDANSETRON 2 MG/ML
4 INJECTION INTRAMUSCULAR; INTRAVENOUS ONCE
Status: COMPLETED | OUTPATIENT
Start: 2020-07-28 | End: 2020-07-28

## 2020-07-28 RX ORDER — 0.9 % SODIUM CHLORIDE 0.9 %
10 VIAL (ML) INJECTION ONCE
Status: COMPLETED | OUTPATIENT
Start: 2020-07-28 | End: 2020-07-28

## 2020-07-28 RX ORDER — POTASSIUM CHLORIDE 1.5 G/1.58G
20 POWDER, FOR SOLUTION ORAL EVERY 4 HOURS PRN
Status: DISCONTINUED | OUTPATIENT
Start: 2020-07-28 | End: 2020-08-01 | Stop reason: HOSPADM

## 2020-07-28 RX ORDER — POTASSIUM CHLORIDE 14.9 MG/ML
20 INJECTION INTRAVENOUS EVERY 4 HOURS PRN
Status: DISCONTINUED | OUTPATIENT
Start: 2020-07-28 | End: 2020-08-01 | Stop reason: HOSPADM

## 2020-07-28 RX ORDER — ONDANSETRON 2 MG/ML
4 INJECTION INTRAMUSCULAR; INTRAVENOUS 2 TIMES DAILY PRN
Status: DISCONTINUED | OUTPATIENT
Start: 2020-07-28 | End: 2020-08-01 | Stop reason: HOSPADM

## 2020-07-28 RX ORDER — PANTOPRAZOLE SODIUM 40 MG/10ML
40 INJECTION, POWDER, LYOPHILIZED, FOR SOLUTION INTRAVENOUS ONCE
Status: COMPLETED | OUTPATIENT
Start: 2020-07-28 | End: 2020-07-28

## 2020-07-28 RX ORDER — IBUPROFEN 200 MG
600 TABLET ORAL EVERY 8 HOURS PRN
Status: ON HOLD | COMMUNITY
End: 2020-08-01 | Stop reason: HOSPADM

## 2020-07-28 RX ORDER — AMOXICILLIN 250 MG
2 CAPSULE ORAL DAILY PRN
Status: DISCONTINUED | OUTPATIENT
Start: 2020-07-28 | End: 2020-08-01 | Stop reason: HOSPADM

## 2020-07-28 RX ORDER — POLYETHYLENE GLYCOL 3350 17 G/17G
17 POWDER, FOR SOLUTION ORAL DAILY PRN
Status: DISCONTINUED | OUTPATIENT
Start: 2020-07-28 | End: 2020-08-01 | Stop reason: HOSPADM

## 2020-07-28 RX ORDER — SODIUM CHLORIDE 9 MG/ML
INJECTION, SOLUTION INTRAVENOUS CONTINUOUS
Status: DISCONTINUED | OUTPATIENT
Start: 2020-07-28 | End: 2020-07-29

## 2020-07-28 RX ORDER — OMEGA-3 FATTY ACIDS/FISH OIL 300-1000MG
200 CAPSULE ORAL 2 TIMES DAILY PRN
Status: ON HOLD | COMMUNITY
End: 2020-08-01 | Stop reason: HOSPADM

## 2020-07-28 RX ORDER — PROCHLORPERAZINE EDISYLATE 5 MG/ML
5 INJECTION INTRAMUSCULAR; INTRAVENOUS EVERY 4 HOURS PRN
Status: DISCONTINUED | OUTPATIENT
Start: 2020-07-28 | End: 2020-08-01 | Stop reason: HOSPADM

## 2020-07-28 RX ORDER — ACETAMINOPHEN 325 MG/1
650 TABLET ORAL EVERY 4 HOURS PRN
Status: DISCONTINUED | OUTPATIENT
Start: 2020-07-28 | End: 2020-08-01 | Stop reason: HOSPADM

## 2020-07-28 RX ORDER — CETIRIZINE HYDROCHLORIDE 10 MG/1
10 TABLET ORAL DAILY
Status: CANCELLED | OUTPATIENT
Start: 2020-07-28

## 2020-07-28 RX ORDER — PROCHLORPERAZINE MALEATE 5 MG/1
5 TABLET ORAL EVERY 4 HOURS PRN
Status: DISCONTINUED | OUTPATIENT
Start: 2020-07-28 | End: 2020-08-01 | Stop reason: HOSPADM

## 2020-07-28 RX ORDER — MAGNESIUM HYDROXIDE/ALUMINUM HYDROXICE/SIMETHICONE 120; 1200; 1200 MG/30ML; MG/30ML; MG/30ML
30 SUSPENSION ORAL EVERY 4 HOURS PRN
Status: DISCONTINUED | OUTPATIENT
Start: 2020-07-28 | End: 2020-08-01 | Stop reason: HOSPADM

## 2020-07-28 RX ORDER — POTASSIUM CHLORIDE 20 MEQ/1
40 TABLET, EXTENDED RELEASE ORAL EVERY 4 HOURS PRN
Status: DISCONTINUED | OUTPATIENT
Start: 2020-07-28 | End: 2020-08-01 | Stop reason: HOSPADM

## 2020-07-28 RX ORDER — BISACODYL 10 MG
10 SUPPOSITORY, RECTAL RECTAL DAILY PRN
Status: DISCONTINUED | OUTPATIENT
Start: 2020-07-28 | End: 2020-08-01 | Stop reason: HOSPADM

## 2020-07-28 RX ORDER — POTASSIUM CHLORIDE 14.9 G/1000ML
40 INJECTION, SOLUTION INTRAVENOUS EVERY 4 HOURS PRN
Status: DISCONTINUED | OUTPATIENT
Start: 2020-07-28 | End: 2020-08-01 | Stop reason: HOSPADM

## 2020-07-28 RX ORDER — POTASSIUM CHLORIDE 1.5 G/1.58G
40 POWDER, FOR SOLUTION ORAL EVERY 4 HOURS PRN
Status: DISCONTINUED | OUTPATIENT
Start: 2020-07-28 | End: 2020-08-01 | Stop reason: HOSPADM

## 2020-07-28 RX ORDER — POTASSIUM CHLORIDE 20 MEQ/1
20 TABLET, EXTENDED RELEASE ORAL EVERY 4 HOURS PRN
Status: DISCONTINUED | OUTPATIENT
Start: 2020-07-28 | End: 2020-08-01 | Stop reason: HOSPADM

## 2020-07-28 RX ORDER — PANTOPRAZOLE SODIUM 40 MG/10ML
40 INJECTION, POWDER, LYOPHILIZED, FOR SOLUTION INTRAVENOUS EVERY 12 HOURS SCHEDULED
Status: DISCONTINUED | OUTPATIENT
Start: 2020-07-28 | End: 2020-07-29

## 2020-07-28 RX ORDER — 0.9 % SODIUM CHLORIDE 0.9 %
2 VIAL (ML) INJECTION EVERY 12 HOURS SCHEDULED
Status: DISCONTINUED | OUTPATIENT
Start: 2020-07-28 | End: 2020-08-01 | Stop reason: HOSPADM

## 2020-07-28 RX ADMIN — POLYETHYLENE GLYCOL-3350 AND ELECTROLYTES 4000 ML: 236; 6.74; 5.86; 2.97; 22.74 POWDER, FOR SOLUTION ORAL at 18:45

## 2020-07-28 RX ADMIN — HYDROMORPHONE HYDROCHLORIDE 1 MG: 1 INJECTION, SOLUTION INTRAMUSCULAR; INTRAVENOUS; SUBCUTANEOUS at 15:53

## 2020-07-28 RX ADMIN — SODIUM CHLORIDE 100 ML: 9 INJECTION, SOLUTION INTRAVENOUS at 12:05

## 2020-07-28 RX ADMIN — IOPAMIDOL 100 ML: 612 INJECTION, SOLUTION INTRAVENOUS at 12:05

## 2020-07-28 RX ADMIN — Medication 15 ML: at 16:46

## 2020-07-28 RX ADMIN — SODIUM CHLORIDE 10 ML: 9 INJECTION, SOLUTION INTRAMUSCULAR; INTRAVENOUS; SUBCUTANEOUS at 12:05

## 2020-07-28 RX ADMIN — PANTOPRAZOLE SODIUM 40 MG: 40 INJECTION, POWDER, FOR SOLUTION INTRAVENOUS at 17:45

## 2020-07-28 RX ADMIN — ONDANSETRON 4 MG: 2 INJECTION INTRAMUSCULAR; INTRAVENOUS at 11:20

## 2020-07-28 RX ADMIN — SODIUM CHLORIDE: 9 INJECTION, SOLUTION INTRAVENOUS at 15:48

## 2020-07-28 RX ADMIN — SODIUM CHLORIDE 1000 ML: 9 INJECTION, SOLUTION INTRAVENOUS at 11:17

## 2020-07-28 RX ADMIN — HYDROMORPHONE HYDROCHLORIDE 1 MG: 1 INJECTION, SOLUTION INTRAMUSCULAR; INTRAVENOUS; SUBCUTANEOUS at 11:21

## 2020-07-28 RX ADMIN — HYDROMORPHONE HYDROCHLORIDE 0.5 MG: 1 INJECTION, SOLUTION INTRAMUSCULAR; INTRAVENOUS; SUBCUTANEOUS at 12:28

## 2020-07-28 RX ADMIN — SODIUM CHLORIDE 2 ML: 9 INJECTION, SOLUTION INTRAMUSCULAR; INTRAVENOUS; SUBCUTANEOUS at 11:22

## 2020-07-28 RX ADMIN — SODIUM CHLORIDE: 9 INJECTION, SOLUTION INTRAVENOUS at 20:52

## 2020-07-28 RX ADMIN — PANTOPRAZOLE SODIUM 40 MG: 40 INJECTION, POWDER, FOR SOLUTION INTRAVENOUS at 12:27

## 2020-07-28 ASSESSMENT — PAIN DESCRIPTION - PAIN TYPE: TYPE: ACUTE PAIN

## 2020-07-28 ASSESSMENT — ENCOUNTER SYMPTOMS
VOMITING: 1
BACK PAIN: 1
HEADACHES: 0
FEVER: 0
COUGH: 0
HEARTBURN: 0
DIAPHORESIS: 0
EYE PAIN: 0
HEMATEMESIS: 0
CONSTIPATION: 0
APPETITE CHANGE: 1
ADENOPATHY: 0
WEAKNESS: 0
ABDOMINAL PAIN: 1
FATIGUE: 0
ANOREXIA: 1
NAUSEA: 1
CHILLS: 0
SORE THROAT: 0
SHORTNESS OF BREATH: 0
HEMATOCHEZIA: 0
DIARRHEA: 0

## 2020-07-28 ASSESSMENT — PAIN SCALES - GENERAL
PAINLEVEL_OUTOF10: 7
PAINLEVEL_OUTOF10: 4
PAINLEVEL_OUTOF10: 6
PAINLEVEL_OUTOF10: 6

## 2020-07-29 ENCOUNTER — ANESTHESIA EVENT (OUTPATIENT)
Dept: SURGERY | Age: 55
End: 2020-07-29

## 2020-07-29 ENCOUNTER — ANESTHESIA (OUTPATIENT)
Dept: SURGERY | Age: 55
End: 2020-07-29

## 2020-07-29 LAB
25(OH)D3+25(OH)D2 SERPL-MCNC: 34.1 NG/ML (ref 30–100)
ANION GAP SERPL CALC-SCNC: 10 MMOL/L (ref 10–20)
BASOPHILS # BLD: 0.1 K/MCL (ref 0–0.3)
BASOPHILS NFR BLD: 0 %
BUN SERPL-MCNC: 9 MG/DL (ref 6–20)
BUN/CREAT SERPL: 10 (ref 7–25)
CALCIUM SERPL-MCNC: 10.6 MG/DL (ref 8.4–10.2)
CHLORIDE SERPL-SCNC: 102 MMOL/L (ref 98–107)
CO2 SERPL-SCNC: 32 MMOL/L (ref 21–32)
CREAT SERPL-MCNC: 0.91 MG/DL (ref 0.67–1.17)
DEPRECATED RDW RBC: 45.4 FL (ref 39–50)
EOSINOPHIL # BLD: 0.2 K/MCL (ref 0.1–0.5)
EOSINOPHIL NFR BLD: 1 %
ERYTHROCYTE [DISTWIDTH] IN BLOOD: 13.2 % (ref 11–15)
FASTING DURATION TIME PATIENT: ABNORMAL H
GFR SERPLBLD BASED ON 1.73 SQ M-ARVRAT: >90 ML/MIN/1.73M2
GLUCOSE SERPL-MCNC: 104 MG/DL (ref 65–99)
HCT VFR BLD CALC: 45.2 % (ref 39–51)
HGB BLD-MCNC: 14.5 G/DL (ref 13–17)
IMM GRANULOCYTES # BLD AUTO: 0.2 K/MCL (ref 0–0.2)
IMM GRANULOCYTES # BLD: 1 %
LYMPHOCYTES # BLD: 1.4 K/MCL (ref 1–4)
LYMPHOCYTES NFR BLD: 10 %
MAGNESIUM SERPL-MCNC: 1.8 MG/DL (ref 1.7–2.4)
MCH RBC QN AUTO: 29.8 PG (ref 26–34)
MCHC RBC AUTO-ENTMCNC: 32.1 G/DL (ref 32–36.5)
MCV RBC AUTO: 93 FL (ref 78–100)
MONOCYTES # BLD: 1.8 K/MCL (ref 0.3–0.9)
MONOCYTES NFR BLD: 12 %
NEUTROPHILS # BLD: 11.1 K/MCL (ref 1.8–7.7)
NEUTROPHILS NFR BLD: 76 %
NRBC BLD MANUAL-RTO: 0 /100 WBC
PHOSPHATE SERPL-MCNC: 2.8 MG/DL (ref 2.4–4.7)
PLATELET # BLD AUTO: 269 K/MCL (ref 140–450)
POTASSIUM SERPL-SCNC: 4.3 MMOL/L (ref 3.4–5.1)
PTH-INTACT SERPL-MCNC: 30 PG/ML (ref 19–88)
RBC # BLD: 4.86 MIL/MCL (ref 4.5–5.9)
SARS-COV-2 RNA RESP QL NAA+PROBE: NOT DETECTED
SERVICE CMNT-IMP: NORMAL
SODIUM SERPL-SCNC: 140 MMOL/L (ref 135–145)
WBC # BLD: 14.6 K/MCL (ref 4.2–11)

## 2020-07-29 PROCEDURE — 83970 ASSAY OF PARATHORMONE: CPT | Performed by: HOSPITALIST

## 2020-07-29 PROCEDURE — 10003428 HB COLONOSCOPY

## 2020-07-29 PROCEDURE — 36415 COLL VENOUS BLD VENIPUNCTURE: CPT | Performed by: HOSPITALIST

## 2020-07-29 PROCEDURE — 10002801 HB RX 250 W/O HCPCS: Performed by: HOSPITALIST

## 2020-07-29 PROCEDURE — 45385 COLONOSCOPY W/LESION REMOVAL: CPT | Performed by: INTERNAL MEDICINE

## 2020-07-29 PROCEDURE — 88305 TISSUE EXAM BY PATHOLOGIST: CPT | Performed by: INTERNAL MEDICINE

## 2020-07-29 PROCEDURE — 83735 ASSAY OF MAGNESIUM: CPT | Performed by: HOSPITALIST

## 2020-07-29 PROCEDURE — 10002800 HB RX 250 W HCPCS: Performed by: HOSPITALIST

## 2020-07-29 PROCEDURE — 10003081 HB IMPLANT GENERAL OR NON CARDIAC 1: Performed by: INTERNAL MEDICINE

## 2020-07-29 PROCEDURE — 96375 TX/PRO/DX INJ NEW DRUG ADDON: CPT

## 2020-07-29 PROCEDURE — 10002807 HB RX 258: Performed by: HOSPITALIST

## 2020-07-29 PROCEDURE — G0378 HOSPITAL OBSERVATION PER HR: HCPCS

## 2020-07-29 PROCEDURE — 10001568 HB ANESTH MAC IV ADD'L 1/2 HR: Performed by: INTERNAL MEDICINE

## 2020-07-29 PROCEDURE — 10002362 HB ANESTH MAC IV 1ST 1/2 HR: Performed by: INTERNAL MEDICINE

## 2020-07-29 PROCEDURE — 45380 COLONOSCOPY AND BIOPSY: CPT | Performed by: INTERNAL MEDICINE

## 2020-07-29 PROCEDURE — 10002800 HB RX 250 W HCPCS: Performed by: ANESTHESIOLOGY

## 2020-07-29 PROCEDURE — 10003428 HB COLONOSCOPY: Performed by: INTERNAL MEDICINE

## 2020-07-29 PROCEDURE — 45385 COLONOSCOPY W/LESION REMOVAL: CPT | Performed by: ANESTHESIOLOGIST ASSISTANT

## 2020-07-29 PROCEDURE — 10002807 HB RX 258: Performed by: ANESTHESIOLOGY

## 2020-07-29 PROCEDURE — C9113 INJ PANTOPRAZOLE SODIUM, VIA: HCPCS | Performed by: HOSPITALIST

## 2020-07-29 PROCEDURE — 10003436 HB UPPER GI ENDOSCOPY: Performed by: INTERNAL MEDICINE

## 2020-07-29 PROCEDURE — 80048 BASIC METABOLIC PNL TOTAL CA: CPT | Performed by: HOSPITALIST

## 2020-07-29 PROCEDURE — 84100 ASSAY OF PHOSPHORUS: CPT

## 2020-07-29 PROCEDURE — 43239 EGD BIOPSY SINGLE/MULTIPLE: CPT | Performed by: INTERNAL MEDICINE

## 2020-07-29 PROCEDURE — 99225 SUBSEQUENT OBSERVATION CARE LEVEL II: CPT | Performed by: HOSPITALIST

## 2020-07-29 PROCEDURE — 85025 COMPLETE CBC W/AUTO DIFF WBC: CPT | Performed by: HOSPITALIST

## 2020-07-29 PROCEDURE — 10004651 HB RX, NO CHARGE ITEM: Performed by: INTERNAL MEDICINE

## 2020-07-29 PROCEDURE — 45385 COLONOSCOPY W/LESION REMOVAL: CPT | Performed by: ANESTHESIOLOGY

## 2020-07-29 DEVICE — WORKING LENGTH 235CM, WORKING CHANNEL 2.8MM
Type: IMPLANTABLE DEVICE | Site: COLON | Status: FUNCTIONAL
Brand: RESOLUTION 360 CLIP

## 2020-07-29 RX ORDER — SODIUM CHLORIDE 450 MG/100ML
INJECTION, SOLUTION INTRAVENOUS CONTINUOUS
Status: DISCONTINUED | OUTPATIENT
Start: 2020-07-29 | End: 2020-08-01 | Stop reason: HOSPADM

## 2020-07-29 RX ORDER — PANTOPRAZOLE SODIUM 40 MG/10ML
40 INJECTION, POWDER, LYOPHILIZED, FOR SOLUTION INTRAVENOUS DAILY
Status: DISCONTINUED | OUTPATIENT
Start: 2020-07-30 | End: 2020-07-30 | Stop reason: ALTCHOICE

## 2020-07-29 RX ORDER — PROPOFOL 10 MG/ML
INJECTION, EMULSION INTRAVENOUS PRN
Status: DISCONTINUED | OUTPATIENT
Start: 2020-07-29 | End: 2020-07-29

## 2020-07-29 RX ORDER — SODIUM CHLORIDE, SODIUM LACTATE, POTASSIUM CHLORIDE, CALCIUM CHLORIDE 600; 310; 30; 20 MG/100ML; MG/100ML; MG/100ML; MG/100ML
INJECTION, SOLUTION INTRAVENOUS CONTINUOUS PRN
Status: DISCONTINUED | OUTPATIENT
Start: 2020-07-29 | End: 2020-07-29

## 2020-07-29 RX ADMIN — SODIUM CHLORIDE: 4.5 INJECTION, SOLUTION INTRAVENOUS at 15:21

## 2020-07-29 RX ADMIN — PROPOFOL 120 MCG/KG/MIN: 10 INJECTION, EMULSION INTRAVENOUS at 11:33

## 2020-07-29 RX ADMIN — SODIUM CHLORIDE: 9 INJECTION, SOLUTION INTRAVENOUS at 00:51

## 2020-07-29 RX ADMIN — MAGNESIUM SULFATE HEPTAHYDRATE 1 G: 1 INJECTION, SOLUTION INTRAVENOUS at 07:54

## 2020-07-29 RX ADMIN — PROPOFOL 100 MG: 10 INJECTION, EMULSION INTRAVENOUS at 11:33

## 2020-07-29 RX ADMIN — SODIUM CHLORIDE, POTASSIUM CHLORIDE, SODIUM LACTATE AND CALCIUM CHLORIDE: 600; 310; 30; 20 INJECTION, SOLUTION INTRAVENOUS at 11:31

## 2020-07-29 RX ADMIN — PANTOPRAZOLE SODIUM 40 MG: 40 INJECTION, POWDER, FOR SOLUTION INTRAVENOUS at 07:56

## 2020-07-29 RX ADMIN — ACETAMINOPHEN 650 MG: 325 TABLET ORAL at 13:24

## 2020-07-29 RX ADMIN — ACETAMINOPHEN 650 MG: 325 TABLET ORAL at 19:57

## 2020-07-29 SDOH — HEALTH STABILITY: MENTAL HEALTH: HOW OFTEN DO YOU HAVE A DRINK CONTAINING ALCOHOL?: MONTHLY OR LESS

## 2020-07-29 SDOH — HEALTH STABILITY: MENTAL HEALTH: HOW MANY STANDARD DRINKS CONTAINING ALCOHOL DO YOU HAVE ON A TYPICAL DAY?: 1 OR 2

## 2020-07-29 SDOH — HEALTH STABILITY: MENTAL HEALTH: HOW OFTEN DO YOU HAVE 6 OR MORE DRINKS ON ONE OCCASION?: NEVER

## 2020-07-29 ASSESSMENT — LIFESTYLE VARIABLES
HOW MANY STANDARD DRINKS CONTAINING ALCOHOL DO YOU HAVE ON A TYPICAL DAY: 3 OR 4
HOW OFTEN DO YOU HAVE A DRINK CONTAINING ALCOHOL: MONTHLY OR LESS
ALCOHOL_USE_STATUS: NO OR LOW RISK WITH VALIDATED TOOL
HOW OFTEN DO YOU HAVE 6 OR MORE DRINKS ON ONE OCCASION: NEVER
AUDIT-C TOTAL SCORE: 2

## 2020-07-29 ASSESSMENT — COGNITIVE AND FUNCTIONAL STATUS - GENERAL
DO YOU HAVE DIFFICULTY DRESSING OR BATHING: NO
BECAUSE OF A PHYSICAL, MENTAL, OR EMOTIONAL CONDITION, DO YOU HAVE SERIOUS DIFFICULTY CONCENTRATING, REMEMBERING OR MAKING DECISIONS: NO
ARE YOU BLIND OR DO YOU HAVE SERIOUS DIFFICULTY SEEING, EVEN WHEN WEARING GLASSES: NO
BECAUSE OF A PHYSICAL, MENTAL, OR EMOTIONAL CONDITION, DO YOU HAVE DIFFICULTY DOING ERRANDS ALONE: NO
ARE YOU DEAF OR DO YOU HAVE SERIOUS DIFFICULTY  HEARING: NO
DO YOU HAVE SERIOUS DIFFICULTY WALKING OR CLIMBING STAIRS: NO

## 2020-07-29 ASSESSMENT — PATIENT HEALTH QUESTIONNAIRE - PHQ9
2. FEELING DOWN, DEPRESSED OR HOPELESS: NOT AT ALL
CLINICAL INTERPRETATION OF PHQ2 SCORE: NO FURTHER SCREENING NEEDED
SUM OF ALL RESPONSES TO PHQ9 QUESTIONS 1 AND 2: 0
SUM OF ALL RESPONSES TO PHQ9 QUESTIONS 1 AND 2: 0
IS PATIENT ABLE TO COMPLETE PHQ2 OR PHQ9: YES
CLINICAL INTERPRETATION OF PHQ9 SCORE: NO FURTHER SCREENING NEEDED
1. LITTLE INTEREST OR PLEASURE IN DOING THINGS: NOT AT ALL

## 2020-07-29 ASSESSMENT — PAIN SCALES - GENERAL
PAINLEVEL_OUTOF10: 3
PAINLEVEL_OUTOF10: 3
PAINLEVEL_OUTOF10: 0
PAINLEVEL_OUTOF10: 5
PAINLEVEL_OUTOF10: 4
PAINLEVEL_OUTOF10: 2
PAINLEVEL_OUTOF10: 0

## 2020-07-29 ASSESSMENT — ACTIVITIES OF DAILY LIVING (ADL)
ADL_SCORE: 12
CHRONIC_PAIN_PRESENT: NO
RECENT_DECLINE_ADL: NO
ADL_BEFORE_ADMISSION: INDEPENDENT
ADL_SHORT_OF_BREATH: NO

## 2020-07-29 ASSESSMENT — COLUMBIA-SUICIDE SEVERITY RATING SCALE - C-SSRS
6. HAVE YOU EVER DONE ANYTHING, STARTED TO DO ANYTHING, OR PREPARED TO DO ANYTHING TO END YOUR LIFE?: NO
1. WITHIN THE PAST MONTH, HAVE YOU WISHED YOU WERE DEAD OR WISHED YOU COULD GO TO SLEEP AND NOT WAKE UP?: NO
2. HAVE YOU ACTUALLY HAD ANY THOUGHTS OF KILLING YOURSELF?: NO
IS THE PATIENT ABLE TO COMPLETE C-SSRS: YES

## 2020-07-29 ASSESSMENT — PAIN DESCRIPTION - PAIN TYPE: TYPE: ACUTE PAIN

## 2020-07-30 LAB
ALBUMIN SERPL-MCNC: 3.2 G/DL (ref 3.6–5.1)
ALBUMIN/GLOB SERPL: 0.9 {RATIO} (ref 1–2.4)
ALP SERPL-CCNC: 72 UNITS/L (ref 45–117)
ALT SERPL-CCNC: 30 UNITS/L
ANION GAP SERPL CALC-SCNC: 12 MMOL/L (ref 10–20)
AST SERPL-CCNC: 17 UNITS/L
BASOPHILS # BLD: 0.1 K/MCL (ref 0–0.3)
BASOPHILS NFR BLD: 1 %
BILIRUB SERPL-MCNC: 0.9 MG/DL (ref 0.2–1)
BUN SERPL-MCNC: 10 MG/DL (ref 6–20)
BUN/CREAT SERPL: 11 (ref 7–25)
CALCIUM SERPL-MCNC: 10.5 MG/DL (ref 8.4–10.2)
CHLORIDE SERPL-SCNC: 104 MMOL/L (ref 98–107)
CO2 SERPL-SCNC: 30 MMOL/L (ref 21–32)
CREAT SERPL-MCNC: 0.92 MG/DL (ref 0.67–1.17)
CRP SERPL-MCNC: 5.4 MG/DL
DEPRECATED RDW RBC: 45.6 FL (ref 39–50)
EOSINOPHIL # BLD: 0.3 K/MCL (ref 0.1–0.5)
EOSINOPHIL NFR BLD: 3 %
ERYTHROCYTE [DISTWIDTH] IN BLOOD: 13.3 % (ref 11–15)
FASTING DURATION TIME PATIENT: ABNORMAL H
GFR SERPLBLD BASED ON 1.73 SQ M-ARVRAT: >90 ML/MIN/1.73M2
GLOBULIN SER-MCNC: 3.4 G/DL (ref 2–4)
GLUCOSE SERPL-MCNC: 105 MG/DL (ref 65–99)
HCT VFR BLD CALC: 44.5 % (ref 39–51)
HGB BLD-MCNC: 14.5 G/DL (ref 13–17)
IMM GRANULOCYTES # BLD AUTO: 0.1 K/MCL (ref 0–0.2)
IMM GRANULOCYTES # BLD: 1 %
LIPASE SERPL-CCNC: 113 UNITS/L (ref 73–393)
LYMPHOCYTES # BLD: 2.1 K/MCL (ref 1–4)
LYMPHOCYTES NFR BLD: 18 %
MAGNESIUM SERPL-MCNC: 1.9 MG/DL (ref 1.7–2.4)
MCH RBC QN AUTO: 30.3 PG (ref 26–34)
MCHC RBC AUTO-ENTMCNC: 32.6 G/DL (ref 32–36.5)
MCV RBC AUTO: 93.1 FL (ref 78–100)
MONOCYTES # BLD: 1.3 K/MCL (ref 0.3–0.9)
MONOCYTES NFR BLD: 11 %
NEUTROPHILS # BLD: 7.9 K/MCL (ref 1.8–7.7)
NEUTROPHILS NFR BLD: 66 %
NRBC BLD MANUAL-RTO: 0 /100 WBC
PLATELET # BLD AUTO: 264 K/MCL (ref 140–450)
POTASSIUM SERPL-SCNC: 3.8 MMOL/L (ref 3.4–5.1)
PROT SERPL-MCNC: 6.6 G/DL (ref 6.4–8.2)
RBC # BLD: 4.78 MIL/MCL (ref 4.5–5.9)
SODIUM SERPL-SCNC: 142 MMOL/L (ref 135–145)
WBC # BLD: 11.9 K/MCL (ref 4.2–11)

## 2020-07-30 PROCEDURE — 10002803 HB RX 637: Performed by: INTERNAL MEDICINE

## 2020-07-30 PROCEDURE — C9113 INJ PANTOPRAZOLE SODIUM, VIA: HCPCS | Performed by: HOSPITALIST

## 2020-07-30 PROCEDURE — G0378 HOSPITAL OBSERVATION PER HR: HCPCS

## 2020-07-30 PROCEDURE — 10002803 HB RX 637: Performed by: HOSPITALIST

## 2020-07-30 PROCEDURE — 99225 SUBSEQUENT OBSERVATION CARE LEVEL II: CPT | Performed by: HOSPITALIST

## 2020-07-30 PROCEDURE — 83690 ASSAY OF LIPASE: CPT | Performed by: HOSPITALIST

## 2020-07-30 PROCEDURE — 10002800 HB RX 250 W HCPCS: Performed by: HOSPITALIST

## 2020-07-30 PROCEDURE — 83735 ASSAY OF MAGNESIUM: CPT | Performed by: HOSPITALIST

## 2020-07-30 PROCEDURE — 80053 COMPREHEN METABOLIC PANEL: CPT | Performed by: HOSPITALIST

## 2020-07-30 PROCEDURE — 10004651 HB RX, NO CHARGE ITEM: Performed by: INTERNAL MEDICINE

## 2020-07-30 PROCEDURE — 86140 C-REACTIVE PROTEIN: CPT | Performed by: HOSPITALIST

## 2020-07-30 PROCEDURE — 10002801 HB RX 250 W/O HCPCS: Performed by: HOSPITALIST

## 2020-07-30 PROCEDURE — 85025 COMPLETE CBC W/AUTO DIFF WBC: CPT | Performed by: HOSPITALIST

## 2020-07-30 PROCEDURE — C9113 INJ PANTOPRAZOLE SODIUM, VIA: HCPCS | Performed by: INTERNAL MEDICINE

## 2020-07-30 PROCEDURE — 10002800 HB RX 250 W HCPCS: Performed by: INTERNAL MEDICINE

## 2020-07-30 PROCEDURE — 36415 COLL VENOUS BLD VENIPUNCTURE: CPT | Performed by: HOSPITALIST

## 2020-07-30 RX ORDER — SUCRALFATE 1 G/1
1 TABLET ORAL
Status: DISCONTINUED | OUTPATIENT
Start: 2020-07-30 | End: 2020-08-01 | Stop reason: HOSPADM

## 2020-07-30 RX ORDER — PANTOPRAZOLE SODIUM 40 MG/1
40 TABLET, DELAYED RELEASE ORAL
Status: DISCONTINUED | OUTPATIENT
Start: 2020-07-31 | End: 2020-08-01 | Stop reason: HOSPADM

## 2020-07-30 RX ORDER — PANTOPRAZOLE SODIUM 40 MG/10ML
40 INJECTION, POWDER, LYOPHILIZED, FOR SOLUTION INTRAVENOUS ONCE
Status: COMPLETED | OUTPATIENT
Start: 2020-07-30 | End: 2020-07-30

## 2020-07-30 RX ORDER — SUCRALFATE 1 G/1
1 TABLET ORAL
Status: DISCONTINUED | OUTPATIENT
Start: 2020-07-30 | End: 2020-07-30

## 2020-07-30 RX ADMIN — PANTOPRAZOLE SODIUM 40 MG: 40 INJECTION, POWDER, FOR SOLUTION INTRAVENOUS at 00:44

## 2020-07-30 RX ADMIN — SODIUM CHLORIDE: 4.5 INJECTION, SOLUTION INTRAVENOUS at 19:19

## 2020-07-30 RX ADMIN — ALUMINUM HYDROXIDE, MAGNESIUM HYDROXIDE, AND SIMETHICONE 30 ML: 200; 200; 20 SUSPENSION ORAL at 17:42

## 2020-07-30 RX ADMIN — SUCRALFATE 1 G: 1 TABLET ORAL at 20:26

## 2020-07-30 RX ADMIN — ACETAMINOPHEN 650 MG: 325 TABLET ORAL at 16:57

## 2020-07-30 RX ADMIN — Medication 15 ML: at 04:51

## 2020-07-30 RX ADMIN — Medication 15 ML: at 08:52

## 2020-07-30 RX ADMIN — PANTOPRAZOLE SODIUM 40 MG: 40 INJECTION, POWDER, FOR SOLUTION INTRAVENOUS at 08:16

## 2020-07-30 RX ADMIN — SODIUM CHLORIDE: 4.5 INJECTION, SOLUTION INTRAVENOUS at 04:07

## 2020-07-30 RX ADMIN — SUCRALFATE 1 G: 1 TABLET ORAL at 17:42

## 2020-07-30 RX ADMIN — SUCRALFATE 1 G: 1 TABLET ORAL at 14:13

## 2020-07-30 RX ADMIN — ACETAMINOPHEN 650 MG: 325 TABLET ORAL at 11:43

## 2020-07-30 ASSESSMENT — PAIN SCALES - GENERAL
PAINLEVEL_OUTOF10: 3
PAINLEVEL_OUTOF10: 9
PAINLEVEL_OUTOF10: 4
PAINLEVEL_OUTOF10: 2

## 2020-07-31 ENCOUNTER — PREP FOR CASE (OUTPATIENT)
Dept: GASTROENTEROLOGY | Age: 55
End: 2020-07-31

## 2020-07-31 DIAGNOSIS — R10.13 EPIGASTRIC BURNING SENSATION: Primary | ICD-10-CM

## 2020-07-31 LAB
ALBUMIN SERPL-MCNC: 3.3 G/DL (ref 3.6–5.1)
ALBUMIN/GLOB SERPL: 1 {RATIO} (ref 1–2.4)
ALP SERPL-CCNC: 70 UNITS/L (ref 45–117)
ALT SERPL-CCNC: 30 UNITS/L
ANION GAP SERPL CALC-SCNC: 13 MMOL/L (ref 10–20)
ASR DISCLAIMER: NORMAL
AST SERPL-CCNC: 18 UNITS/L
BASOPHILS # BLD: 0.1 K/MCL (ref 0–0.3)
BASOPHILS NFR BLD: 1 %
BILIRUB SERPL-MCNC: 0.8 MG/DL (ref 0.2–1)
BUN SERPL-MCNC: 12 MG/DL (ref 6–20)
BUN/CREAT SERPL: 13 (ref 7–25)
CALCIUM SERPL-MCNC: 10.9 MG/DL (ref 8.4–10.2)
CASE RPRT: NORMAL
CHLORIDE SERPL-SCNC: 102 MMOL/L (ref 98–107)
CLINICAL INFO: NORMAL
CO2 SERPL-SCNC: 29 MMOL/L (ref 21–32)
CREAT SERPL-MCNC: 0.95 MG/DL (ref 0.67–1.17)
CRP SERPL-MCNC: 2.2 MG/DL
DEPRECATED RDW RBC: 45.1 FL (ref 39–50)
EOSINOPHIL # BLD: 0.4 K/MCL (ref 0.1–0.5)
EOSINOPHIL NFR BLD: 3 %
ERYTHROCYTE [DISTWIDTH] IN BLOOD: 13.2 % (ref 11–15)
FASTING DURATION TIME PATIENT: ABNORMAL H
GFR SERPLBLD BASED ON 1.73 SQ M-ARVRAT: 90 ML/MIN/1.73M2
GLOBULIN SER-MCNC: 3.3 G/DL (ref 2–4)
GLUCOSE SERPL-MCNC: 104 MG/DL (ref 65–99)
HCT VFR BLD CALC: 45.8 % (ref 39–51)
HGB BLD-MCNC: 14.7 G/DL (ref 13–17)
IMM GRANULOCYTES # BLD AUTO: 0.2 K/MCL (ref 0–0.2)
IMM GRANULOCYTES # BLD: 2 %
LIPASE SERPL-CCNC: 93 UNITS/L (ref 73–393)
LYMPHOCYTES # BLD: 2.3 K/MCL (ref 1–4)
LYMPHOCYTES NFR BLD: 20 %
MAGNESIUM SERPL-MCNC: 2 MG/DL (ref 1.7–2.4)
MCH RBC QN AUTO: 30 PG (ref 26–34)
MCHC RBC AUTO-ENTMCNC: 32.1 G/DL (ref 32–36.5)
MCV RBC AUTO: 93.5 FL (ref 78–100)
MONOCYTES # BLD: 1.1 K/MCL (ref 0.3–0.9)
MONOCYTES NFR BLD: 10 %
NEUTROPHILS # BLD: 7.3 K/MCL (ref 1.8–7.7)
NEUTROPHILS NFR BLD: 64 %
NRBC BLD MANUAL-RTO: 0 /100 WBC
PATH REPORT.FINAL DX SPEC: NORMAL
PATH REPORT.GROSS SPEC: NORMAL
PATH REPORT.MICROSCOPIC SPEC OTHER STN: NORMAL
PLATELET # BLD AUTO: 286 K/MCL (ref 140–450)
POTASSIUM SERPL-SCNC: 3.9 MMOL/L (ref 3.4–5.1)
PROT SERPL-MCNC: 6.6 G/DL (ref 6.4–8.2)
RBC # BLD: 4.9 MIL/MCL (ref 4.5–5.9)
SODIUM SERPL-SCNC: 140 MMOL/L (ref 135–145)
WBC # BLD: 11.3 K/MCL (ref 4.2–11)

## 2020-07-31 PROCEDURE — 86140 C-REACTIVE PROTEIN: CPT | Performed by: HOSPITALIST

## 2020-07-31 PROCEDURE — 10002801 HB RX 250 W/O HCPCS: Performed by: HOSPITALIST

## 2020-07-31 PROCEDURE — 83735 ASSAY OF MAGNESIUM: CPT | Performed by: HOSPITALIST

## 2020-07-31 PROCEDURE — 10002803 HB RX 637: Performed by: HOSPITALIST

## 2020-07-31 PROCEDURE — 80053 COMPREHEN METABOLIC PANEL: CPT | Performed by: HOSPITALIST

## 2020-07-31 PROCEDURE — 36415 COLL VENOUS BLD VENIPUNCTURE: CPT | Performed by: HOSPITALIST

## 2020-07-31 PROCEDURE — 83690 ASSAY OF LIPASE: CPT | Performed by: HOSPITALIST

## 2020-07-31 PROCEDURE — 10002803 HB RX 637: Performed by: INTERNAL MEDICINE

## 2020-07-31 PROCEDURE — 10004651 HB RX, NO CHARGE ITEM: Performed by: INTERNAL MEDICINE

## 2020-07-31 PROCEDURE — 85025 COMPLETE CBC W/AUTO DIFF WBC: CPT | Performed by: HOSPITALIST

## 2020-07-31 PROCEDURE — 99225 SUBSEQUENT OBSERVATION CARE LEVEL II: CPT | Performed by: HOSPITALIST

## 2020-07-31 PROCEDURE — G0378 HOSPITAL OBSERVATION PER HR: HCPCS

## 2020-07-31 RX ADMIN — SUCRALFATE 1 G: 1 TABLET ORAL at 11:17

## 2020-07-31 RX ADMIN — SUCRALFATE 1 G: 1 TABLET ORAL at 06:00

## 2020-07-31 RX ADMIN — ACETAMINOPHEN 650 MG: 325 TABLET ORAL at 08:15

## 2020-07-31 RX ADMIN — SUCRALFATE 1 G: 1 TABLET ORAL at 20:40

## 2020-07-31 RX ADMIN — ALUMINUM HYDROXIDE, MAGNESIUM HYDROXIDE, AND SIMETHICONE 30 ML: 200; 200; 20 SUSPENSION ORAL at 05:54

## 2020-07-31 RX ADMIN — SODIUM CHLORIDE: 4.5 INJECTION, SOLUTION INTRAVENOUS at 09:13

## 2020-07-31 RX ADMIN — PANTOPRAZOLE SODIUM 40 MG: 40 TABLET, DELAYED RELEASE ORAL at 05:54

## 2020-07-31 RX ADMIN — SODIUM CHLORIDE: 4.5 INJECTION, SOLUTION INTRAVENOUS at 18:27

## 2020-07-31 RX ADMIN — SUCRALFATE 1 G: 1 TABLET ORAL at 15:49

## 2020-07-31 ASSESSMENT — PAIN SCALES - GENERAL
PAINLEVEL_OUTOF10: 5
PAINLEVEL_OUTOF10: 0
PAINLEVEL_OUTOF10: 0

## 2020-08-01 VITALS
HEART RATE: 76 BPM | DIASTOLIC BLOOD PRESSURE: 67 MMHG | OXYGEN SATURATION: 98 % | RESPIRATION RATE: 18 BRPM | SYSTOLIC BLOOD PRESSURE: 118 MMHG | WEIGHT: 258.82 LBS | TEMPERATURE: 98.8 F | HEIGHT: 71 IN | BODY MASS INDEX: 36.23 KG/M2

## 2020-08-01 LAB
ANION GAP SERPL CALC-SCNC: 10 MMOL/L (ref 10–20)
BASOPHILS # BLD: 0.1 K/MCL (ref 0–0.3)
BASOPHILS NFR BLD: 1 %
BUN SERPL-MCNC: 13 MG/DL (ref 6–20)
BUN/CREAT SERPL: 14 (ref 7–25)
CALCIUM SERPL-MCNC: 10.5 MG/DL (ref 8.4–10.2)
CHLORIDE SERPL-SCNC: 104 MMOL/L (ref 98–107)
CO2 SERPL-SCNC: 31 MMOL/L (ref 21–32)
CREAT SERPL-MCNC: 0.93 MG/DL (ref 0.67–1.17)
CRP SERPL-MCNC: 0.8 MG/DL
DEPRECATED RDW RBC: 44.1 FL (ref 39–50)
EOSINOPHIL # BLD: 0.3 K/MCL (ref 0.1–0.5)
EOSINOPHIL NFR BLD: 3 %
ERYTHROCYTE [DISTWIDTH] IN BLOOD: 13 % (ref 11–15)
FASTING DURATION TIME PATIENT: ABNORMAL H
GFR SERPLBLD BASED ON 1.73 SQ M-ARVRAT: >90 ML/MIN/1.73M2
GLUCOSE SERPL-MCNC: 94 MG/DL (ref 65–99)
HCT VFR BLD CALC: 46.1 % (ref 39–51)
HGB BLD-MCNC: 14.7 G/DL (ref 13–17)
IMM GRANULOCYTES # BLD AUTO: 0.2 K/MCL (ref 0–0.2)
IMM GRANULOCYTES # BLD: 2 %
LYMPHOCYTES # BLD: 2.1 K/MCL (ref 1–4)
LYMPHOCYTES NFR BLD: 19 %
MAGNESIUM SERPL-MCNC: 2 MG/DL (ref 1.7–2.4)
MCH RBC QN AUTO: 29.6 PG (ref 26–34)
MCHC RBC AUTO-ENTMCNC: 31.9 G/DL (ref 32–36.5)
MCV RBC AUTO: 92.9 FL (ref 78–100)
MONOCYTES # BLD: 1.1 K/MCL (ref 0.3–0.9)
MONOCYTES NFR BLD: 10 %
NEUTROPHILS # BLD: 7.4 K/MCL (ref 1.8–7.7)
NEUTROPHILS NFR BLD: 65 %
NRBC BLD MANUAL-RTO: 0 /100 WBC
PLATELET # BLD AUTO: 296 K/MCL (ref 140–450)
POTASSIUM SERPL-SCNC: 3.9 MMOL/L (ref 3.4–5.1)
RBC # BLD: 4.96 MIL/MCL (ref 4.5–5.9)
SODIUM SERPL-SCNC: 141 MMOL/L (ref 135–145)
WBC # BLD: 11.2 K/MCL (ref 4.2–11)

## 2020-08-01 PROCEDURE — 85025 COMPLETE CBC W/AUTO DIFF WBC: CPT | Performed by: HOSPITALIST

## 2020-08-01 PROCEDURE — G0378 HOSPITAL OBSERVATION PER HR: HCPCS

## 2020-08-01 PROCEDURE — 10002801 HB RX 250 W/O HCPCS: Performed by: HOSPITALIST

## 2020-08-01 PROCEDURE — 10002803 HB RX 637: Performed by: HOSPITALIST

## 2020-08-01 PROCEDURE — 83735 ASSAY OF MAGNESIUM: CPT | Performed by: HOSPITALIST

## 2020-08-01 PROCEDURE — 99217 OBSERVATION CARE DISCHARGE: CPT | Performed by: HOSPITALIST

## 2020-08-01 PROCEDURE — 86140 C-REACTIVE PROTEIN: CPT | Performed by: HOSPITALIST

## 2020-08-01 PROCEDURE — 10002803 HB RX 637: Performed by: INTERNAL MEDICINE

## 2020-08-01 PROCEDURE — 80048 BASIC METABOLIC PNL TOTAL CA: CPT | Performed by: HOSPITALIST

## 2020-08-01 PROCEDURE — 36415 COLL VENOUS BLD VENIPUNCTURE: CPT | Performed by: HOSPITALIST

## 2020-08-01 RX ORDER — PANTOPRAZOLE SODIUM 40 MG/1
40 TABLET, DELAYED RELEASE ORAL
Qty: 30 TABLET | Refills: 0 | Status: SHIPPED | OUTPATIENT
Start: 2020-08-02 | End: 2021-10-05 | Stop reason: ALTCHOICE

## 2020-08-01 RX ORDER — SUCRALFATE 1 G/1
1 TABLET ORAL
Qty: 120 TABLET | Refills: 0 | Status: ON HOLD | OUTPATIENT
Start: 2020-08-01 | End: 2021-11-04 | Stop reason: HOSPADM

## 2020-08-01 RX ORDER — MAGNESIUM HYDROXIDE/ALUMINUM HYDROXICE/SIMETHICONE 120; 1200; 1200 MG/30ML; MG/30ML; MG/30ML
30 SUSPENSION ORAL 3 TIMES DAILY PRN
Qty: 1 BOTTLE | Refills: 0 | Status: ON HOLD | OUTPATIENT
Start: 2020-08-01 | End: 2021-11-04 | Stop reason: HOSPADM

## 2020-08-01 RX ADMIN — SODIUM CHLORIDE: 4.5 INJECTION, SOLUTION INTRAVENOUS at 10:19

## 2020-08-01 RX ADMIN — SUCRALFATE 1 G: 1 TABLET ORAL at 11:14

## 2020-08-01 RX ADMIN — SUCRALFATE 1 G: 1 TABLET ORAL at 06:11

## 2020-08-01 RX ADMIN — ALUMINUM HYDROXIDE, MAGNESIUM HYDROXIDE, AND SIMETHICONE 30 ML: 200; 200; 20 SUSPENSION ORAL at 02:47

## 2020-08-01 RX ADMIN — PANTOPRAZOLE SODIUM 40 MG: 40 TABLET, DELAYED RELEASE ORAL at 06:11

## 2020-08-01 RX ADMIN — SODIUM CHLORIDE: 4.5 INJECTION, SOLUTION INTRAVENOUS at 02:23

## 2020-08-01 ASSESSMENT — PAIN SCALES - GENERAL: PAINLEVEL_OUTOF10: 0

## 2020-08-02 LAB
BACTERIA BLD CULT: NORMAL
BACTERIA BLD CULT: NORMAL

## 2020-08-03 ENCOUNTER — TELEPHONE (OUTPATIENT)
Dept: GASTROENTEROLOGY | Age: 55
End: 2020-08-03

## 2020-08-04 DIAGNOSIS — Z86.010 HX OF ADENOMATOUS POLYP OF COLON: Primary | ICD-10-CM

## 2020-08-04 DIAGNOSIS — D13.2 ADENOMATOUS DUODENAL POLYP: ICD-10-CM

## 2020-08-12 ENCOUNTER — TELEPHONE (OUTPATIENT)
Dept: GASTROENTEROLOGY | Age: 55
End: 2020-08-12

## 2020-08-12 ENCOUNTER — TELEPHONE (OUTPATIENT)
Dept: SCHEDULING | Age: 55
End: 2020-08-12

## 2020-08-13 DIAGNOSIS — R10.13 EPIGASTRIC BURNING SENSATION: Primary | ICD-10-CM

## 2020-08-13 RX ORDER — SODIUM CHLORIDE 9 MG/ML
INJECTION, SOLUTION INTRAVENOUS CONTINUOUS
Status: CANCELLED | OUTPATIENT
Start: 2020-08-13

## 2020-08-24 ENCOUNTER — TELEPHONE (OUTPATIENT)
Dept: GASTROENTEROLOGY | Age: 55
End: 2020-08-24

## 2020-08-24 DIAGNOSIS — Z01.812 PRE-PROCEDURAL LABORATORY EXAMINATIONS: Primary | ICD-10-CM

## 2020-08-24 DIAGNOSIS — K31.9 GASTRIC LESION: ICD-10-CM

## 2020-09-02 SDOH — HEALTH STABILITY: MENTAL HEALTH: HOW OFTEN DO YOU HAVE 6 OR MORE DRINKS ON ONE OCCASION?: NEVER

## 2020-09-02 SDOH — HEALTH STABILITY: MENTAL HEALTH: HOW OFTEN DO YOU HAVE A DRINK CONTAINING ALCOHOL?: MONTHLY OR LESS

## 2020-09-02 SDOH — HEALTH STABILITY: MENTAL HEALTH: HOW MANY STANDARD DRINKS CONTAINING ALCOHOL DO YOU HAVE ON A TYPICAL DAY?: 1 OR 2

## 2020-09-02 ASSESSMENT — ACTIVITIES OF DAILY LIVING (ADL)
CHRONIC_PAIN_PRESENT: NO
ADL_SCORE: 12
HISTORY OF FALLING IN THE LAST YEAR (PRIOR TO ADMISSION): NO
NEEDS_ASSIST: NO
ADL_BEFORE_ADMISSION: INDEPENDENT
ADL_SHORT_OF_BREATH: NO
RECENT_DECLINE_ADL: NO

## 2020-09-02 ASSESSMENT — COGNITIVE AND FUNCTIONAL STATUS - GENERAL
ARE YOU BLIND OR DO YOU HAVE SERIOUS DIFFICULTY SEEING, EVEN WHEN WEARING GLASSES: NO
ARE YOU DEAF OR DO YOU HAVE SERIOUS DIFFICULTY  HEARING: NO

## 2020-09-08 ENCOUNTER — LAB SERVICES (OUTPATIENT)
Dept: LAB | Age: 55
End: 2020-09-08

## 2020-09-08 DIAGNOSIS — Z01.812 PRE-PROCEDURAL LABORATORY EXAMINATIONS: ICD-10-CM

## 2020-09-08 DIAGNOSIS — K31.9 GASTRIC LESION: ICD-10-CM

## 2020-09-08 PROCEDURE — U0003 INFECTIOUS AGENT DETECTION BY NUCLEIC ACID (DNA OR RNA); SEVERE ACUTE RESPIRATORY SYNDROME CORONAVIRUS 2 (SARS-COV-2) (CORONAVIRUS DISEASE [COVID-19]), AMPLIFIED PROBE TECHNIQUE, MAKING USE OF HIGH THROUGHPUT TECHNOLOGIES AS DESCRIBED BY CMS-2020-01-R: HCPCS | Performed by: INTERNAL MEDICINE

## 2020-09-09 ENCOUNTER — ANESTHESIA EVENT (OUTPATIENT)
Dept: SURGERY | Age: 55
End: 2020-09-09

## 2020-09-09 LAB
SARS-COV-2 RNA RESP QL NAA+PROBE: NOT DETECTED
SERVICE CMNT-IMP: NORMAL
SERVICE CMNT-IMP: NORMAL

## 2020-09-09 SDOH — HEALTH STABILITY: MENTAL HEALTH: HOW OFTEN DO YOU HAVE 6 OR MORE DRINKS ON ONE OCCASION?: NEVER

## 2020-09-09 SDOH — HEALTH STABILITY: MENTAL HEALTH: HOW MANY STANDARD DRINKS CONTAINING ALCOHOL DO YOU HAVE ON A TYPICAL DAY?: 1 OR 2

## 2020-09-09 SDOH — HEALTH STABILITY: MENTAL HEALTH: HOW OFTEN DO YOU HAVE A DRINK CONTAINING ALCOHOL?: MONTHLY OR LESS

## 2020-09-10 ENCOUNTER — HOSPITAL ENCOUNTER (OUTPATIENT)
Age: 55
Discharge: HOME | End: 2020-09-10
Attending: INTERNAL MEDICINE | Admitting: INTERNAL MEDICINE

## 2020-09-10 ENCOUNTER — ANESTHESIA (OUTPATIENT)
Dept: SURGERY | Age: 55
End: 2020-09-10

## 2020-09-10 DIAGNOSIS — R10.13 EPIGASTRIC BURNING SENSATION: ICD-10-CM

## 2020-09-10 PROCEDURE — 43251 EGD REMOVE LESION SNARE: CPT | Performed by: ANESTHESIOLOGIST ASSISTANT

## 2020-09-10 PROCEDURE — 10001568 HB ANESTH MAC IV ADD'L 1/2 HR: Performed by: INTERNAL MEDICINE

## 2020-09-10 PROCEDURE — 10002800 HB RX 250 W HCPCS: Performed by: ANESTHESIOLOGIST ASSISTANT

## 2020-09-10 PROCEDURE — 43239 EGD BIOPSY SINGLE/MULTIPLE: CPT | Performed by: INTERNAL MEDICINE

## 2020-09-10 PROCEDURE — 88305 TISSUE EXAM BY PATHOLOGIST: CPT | Performed by: INTERNAL MEDICINE

## 2020-09-10 PROCEDURE — 10004316 HB COUNTER-PREP

## 2020-09-10 PROCEDURE — 10003436 HB UPPER GI ENDOSCOPY: Performed by: INTERNAL MEDICINE

## 2020-09-10 PROCEDURE — 10002807 HB RX 258: Performed by: ANESTHESIOLOGIST ASSISTANT

## 2020-09-10 PROCEDURE — 10002801 HB RX 250 W/O HCPCS: Performed by: ANESTHESIOLOGIST ASSISTANT

## 2020-09-10 PROCEDURE — 43251 EGD REMOVE LESION SNARE: CPT | Performed by: ANESTHESIOLOGY

## 2020-09-10 PROCEDURE — 10003429 HB ENDOSCOPY W/ENDOSCOPIC ULTRASOUND: Performed by: INTERNAL MEDICINE

## 2020-09-10 PROCEDURE — 10002362 HB ANESTH MAC IV 1ST 1/2 HR: Performed by: INTERNAL MEDICINE

## 2020-09-10 PROCEDURE — 43251 EGD REMOVE LESION SNARE: CPT | Performed by: INTERNAL MEDICINE

## 2020-09-10 PROCEDURE — 10004560 HB COUNTER-EXTENDED RECOVERY PER HOUR

## 2020-09-10 RX ORDER — LIDOCAINE HYDROCHLORIDE 10 MG/ML
INJECTION, SOLUTION INFILTRATION; PERINEURAL PRN
Status: DISCONTINUED | OUTPATIENT
Start: 2020-09-10 | End: 2020-09-10

## 2020-09-10 RX ORDER — SODIUM CHLORIDE, SODIUM LACTATE, POTASSIUM CHLORIDE, CALCIUM CHLORIDE 600; 310; 30; 20 MG/100ML; MG/100ML; MG/100ML; MG/100ML
INJECTION, SOLUTION INTRAVENOUS CONTINUOUS PRN
Status: DISCONTINUED | OUTPATIENT
Start: 2020-09-10 | End: 2020-09-10

## 2020-09-10 RX ORDER — PANTOPRAZOLE SODIUM 40 MG/1
40 TABLET, DELAYED RELEASE ORAL DAILY
Qty: 30 TABLET | Refills: 11 | Status: SHIPPED | OUTPATIENT
Start: 2020-09-10 | End: 2021-10-05

## 2020-09-10 RX ORDER — ONDANSETRON 2 MG/ML
4 INJECTION INTRAMUSCULAR; INTRAVENOUS EVERY 12 HOURS PRN
Status: CANCELLED | OUTPATIENT
Start: 2020-09-10

## 2020-09-10 RX ORDER — LIDOCAINE HYDROCHLORIDE 10 MG/ML
5-10 INJECTION, SOLUTION INFILTRATION; PERINEURAL PRN
Status: DISCONTINUED | OUTPATIENT
Start: 2020-09-10 | End: 2020-09-10 | Stop reason: HOSPADM

## 2020-09-10 RX ORDER — 0.9 % SODIUM CHLORIDE 0.9 %
2 VIAL (ML) INJECTION EVERY 12 HOURS SCHEDULED
Status: DISCONTINUED | OUTPATIENT
Start: 2020-09-10 | End: 2020-09-10 | Stop reason: HOSPADM

## 2020-09-10 RX ORDER — SODIUM CHLORIDE 9 MG/ML
INJECTION, SOLUTION INTRAVENOUS CONTINUOUS
Status: DISCONTINUED | OUTPATIENT
Start: 2020-09-10 | End: 2020-09-10 | Stop reason: HOSPADM

## 2020-09-10 RX ORDER — PROPOFOL 10 MG/ML
INJECTION, EMULSION INTRAVENOUS PRN
Status: DISCONTINUED | OUTPATIENT
Start: 2020-09-10 | End: 2020-09-10

## 2020-09-10 RX ORDER — ONDANSETRON 4 MG/1
4 TABLET, ORALLY DISINTEGRATING ORAL EVERY 12 HOURS PRN
Status: DISCONTINUED | OUTPATIENT
Start: 2020-09-10 | End: 2020-09-10 | Stop reason: HOSPADM

## 2020-09-10 RX ORDER — ACETAMINOPHEN 325 MG/1
650 TABLET ORAL EVERY 4 HOURS PRN
Status: DISCONTINUED | OUTPATIENT
Start: 2020-09-10 | End: 2020-09-10 | Stop reason: HOSPADM

## 2020-09-10 RX ORDER — ONDANSETRON 2 MG/ML
4 INJECTION INTRAMUSCULAR; INTRAVENOUS EVERY 12 HOURS PRN
Status: DISCONTINUED | OUTPATIENT
Start: 2020-09-10 | End: 2020-09-10 | Stop reason: HOSPADM

## 2020-09-10 RX ORDER — ONDANSETRON 4 MG/1
4 TABLET, ORALLY DISINTEGRATING ORAL EVERY 12 HOURS PRN
Status: CANCELLED | OUTPATIENT
Start: 2020-09-10

## 2020-09-10 RX ORDER — SODIUM CHLORIDE, SODIUM LACTATE, POTASSIUM CHLORIDE, CALCIUM CHLORIDE 600; 310; 30; 20 MG/100ML; MG/100ML; MG/100ML; MG/100ML
INJECTION, SOLUTION INTRAVENOUS CONTINUOUS
Status: DISCONTINUED | OUTPATIENT
Start: 2020-09-10 | End: 2020-09-10 | Stop reason: HOSPADM

## 2020-09-10 RX ADMIN — PROPOFOL 100 MCG/KG/MIN: 10 INJECTION, EMULSION INTRAVENOUS at 11:30

## 2020-09-10 RX ADMIN — PROPOFOL 50 MG: 10 INJECTION, EMULSION INTRAVENOUS at 11:33

## 2020-09-10 RX ADMIN — LIDOCAINE HYDROCHLORIDE 50 MG: 10 INJECTION, SOLUTION INFILTRATION; PERINEURAL at 11:30

## 2020-09-10 RX ADMIN — GLUCAGON HYDROCHLORIDE 0.5 MG: KIT at 12:05

## 2020-09-10 RX ADMIN — PROPOFOL 100 MG: 10 INJECTION, EMULSION INTRAVENOUS at 11:30

## 2020-09-10 RX ADMIN — SODIUM CHLORIDE, POTASSIUM CHLORIDE, SODIUM LACTATE AND CALCIUM CHLORIDE: 600; 310; 30; 20 INJECTION, SOLUTION INTRAVENOUS at 11:23

## 2020-09-10 RX ADMIN — PROPOFOL 50 MG: 10 INJECTION, EMULSION INTRAVENOUS at 11:35

## 2020-09-10 SDOH — HEALTH STABILITY: MENTAL HEALTH: HOW MANY STANDARD DRINKS CONTAINING ALCOHOL DO YOU HAVE ON A TYPICAL DAY?: 1 OR 2

## 2020-09-10 SDOH — HEALTH STABILITY: MENTAL HEALTH: HOW OFTEN DO YOU HAVE 6 OR MORE DRINKS ON ONE OCCASION?: NEVER

## 2020-09-10 SDOH — HEALTH STABILITY: MENTAL HEALTH: HOW OFTEN DO YOU HAVE A DRINK CONTAINING ALCOHOL?: MONTHLY OR LESS

## 2020-09-10 ASSESSMENT — PAIN SCALES - GENERAL
PAINLEVEL_OUTOF10: 3
PAINLEVEL_OUTOF10: 0

## 2020-09-11 LAB
ASR DISCLAIMER: NORMAL
CASE RPRT: NORMAL
CLINICAL INFO: NORMAL
PATH REPORT.FINAL DX SPEC: NORMAL
PATH REPORT.GROSS SPEC: NORMAL
PATH REPORT.MICROSCOPIC SPEC OTHER STN: NORMAL

## 2020-09-14 ENCOUNTER — TELEPHONE (OUTPATIENT)
Dept: GASTROENTEROLOGY | Age: 55
End: 2020-09-14

## 2020-10-29 LAB
25(OH)D3+25(OH)D2 SERPL-MCNC: 31.4 NG/ML (ref 30–100)
ALBUMIN SERPL-MCNC: 4.6 G/DL (ref 3.8–5)
ALP SERPL-CCNC: 88 U/L (ref 40–129)
ALT SERPL-CCNC: 39 U/L (ref 8–66)
ANION GAP SERPL CALC-SCNC: 8 MMOL/L (ref 10–18)
AST SERPL-CCNC: 31 U/L (ref 13–44)
BILIRUB SERPL-MCNC: 0.7 MG/DL (ref 0.2–1.2)
BUN SERPL-MCNC: 16 MG/DL (ref 6–23)
CA-I ADJ PH7.4 SERPL-SCNC: 1.45 MMOL/L
CA-I SERPL ISE-SCNC: 1.48 MMOL/L (ref 1.18–1.33)
CALCIUM SERPL-MCNC: 11.3 MG/DL (ref 8.6–10.2)
CHLORIDE SERPL-SCNC: 101 MMOL/L (ref 96–105)
CO2 SERPL-SCNC: 30 MMOL/L (ref 22–29)
CREAT SERPL-MCNC: 1.04 MG/DL (ref 0.7–1.3)
GLUCOSE SERPL-MCNC: 95 MG/DL (ref 65–99)
LENGTH OF FAST TIME PATIENT: ABNORMAL H
MAGNESIUM SERPL-MCNC: 2.2 MG/DL (ref 1.6–2.6)
PH (OFFPRE67): 7.36
PHOSPHATE SERPL-MCNC: 3 MG/DL (ref 2.5–4.5)
POTASSIUM SERPL-SCNC: 4.4 MMOL/L (ref 3.4–5.1)
PROT SERPL-MCNC: 7 G/DL (ref 6.1–8.2)
PTH-INTACT SERPL-MCNC: 27.3 PG/ML (ref 15–72)
SODIUM SERPL-SCNC: 139 MMOL/L (ref 136–145)
TSH SERPL-ACNC: 1.98 UIU/ML (ref 0.27–4.2)
URATE SERPL-MCNC: 7.6 MG/DL (ref 3.4–7)

## 2020-11-25 LAB
ALBUMIN SERPL-MCNC: 4.5 G/DL (ref 3.8–5)
CA-I ADJ PH7.4 SERPL-SCNC: 1.58 MMOL/L
CA-I SERPL ISE-SCNC: 1.59 MMOL/L (ref 1.18–1.33)
CALCIUM SERPL-MCNC: 11.8 MG/DL (ref 8.6–10.2)
CREAT SERPL-MCNC: 1.17 MG/DL (ref 0.7–1.3)
PH (OFFPRE67): 7.39
PTH-INTACT SERPL-MCNC: 16.5 PG/ML (ref 15–72)

## 2020-12-18 LAB
ALBUMIN SERPL-MCNC: 4.2 G/DL (ref 3.8–5)
CA-I ADJ PH7.4 SERPL-SCNC: 1.47 MMOL/L
CA-I SERPL ISE-SCNC: 1.51 MMOL/L (ref 1.18–1.33)
CALCIUM SERPL-MCNC: 10.9 MG/DL (ref 8.6–10.2)
CREAT SERPL-MCNC: 1 MG/DL (ref 0.7–1.3)
PH (OFFPRE67): 7.35
PHOSPHATE SERPL-MCNC: 3.1 MG/DL (ref 2.5–4.5)
PTH-INTACT SERPL-MCNC: 24.4 PG/ML (ref 15–72)

## 2020-12-21 LAB
A2 MACROGLOB SERPL-MCNC: 0.73 G/DL (ref 0.5–1.1)
ALBUMIN (OFFPRE4): 3.59 G/DL (ref 2.9–5.1)
ALPHA 1 GLOBULINS (OFFPRE5): 0.21 G/DL (ref 0.1–0.4)
BETA 1 GLOBULIN: 1.08 G/DL (ref 0.7–1.6)
GAMMA GLOBULINS (OFFPRE8): 0.9 G/DL (ref 0.6–2)
PATH INTERP SPEC-IMP: NORMAL
PROT SERPL-MCNC: 6.5 G/DL (ref 6.1–8.2)
VITAMIN D 1 25 DIHYDROXY (125VD): 226.5 PG/ML (ref 19.9–79.3)

## 2021-01-01 ENCOUNTER — EXTERNAL RECORD (OUTPATIENT)
Dept: OTHER | Age: 56
End: 2021-01-01

## 2021-04-05 ENCOUNTER — TELEPHONE (OUTPATIENT)
Dept: UROLOGY | Age: 56
End: 2021-04-05

## 2021-04-06 VITALS
OXYGEN SATURATION: 96 % | HEIGHT: 70 IN | DIASTOLIC BLOOD PRESSURE: 69 MMHG | BODY MASS INDEX: 36.61 KG/M2 | HEIGHT: 71 IN | TEMPERATURE: 97.8 F | OXYGEN SATURATION: 98 % | WEIGHT: 255.73 LBS | SYSTOLIC BLOOD PRESSURE: 132 MMHG | WEIGHT: 262.35 LBS | HEART RATE: 98 BPM | TEMPERATURE: 97.3 F | BODY MASS INDEX: 36.73 KG/M2 | SYSTOLIC BLOOD PRESSURE: 120 MMHG | RESPIRATION RATE: 16 BRPM | RESPIRATION RATE: 20 BRPM | DIASTOLIC BLOOD PRESSURE: 85 MMHG | HEART RATE: 72 BPM

## 2021-08-11 ENCOUNTER — PREP FOR CASE (OUTPATIENT)
Dept: GASTROENTEROLOGY | Age: 56
End: 2021-08-11

## 2021-08-11 DIAGNOSIS — Z01.812 PRE-PROCEDURAL LABORATORY EXAMINATIONS: Primary | ICD-10-CM

## 2021-09-22 ENCOUNTER — PREP FOR CASE (OUTPATIENT)
Dept: GASTROENTEROLOGY | Age: 56
End: 2021-09-22

## 2021-09-22 DIAGNOSIS — Z86.010 HISTORY OF COLON POLYPS: Primary | ICD-10-CM

## 2021-09-22 RX ORDER — POLYETHYLENE GLYCOL 3350, SODIUM CHLORIDE, SODIUM BICARBONATE, POTASSIUM CHLORIDE 420; 11.2; 5.72; 1.48 G/4L; G/4L; G/4L; G/4L
POWDER, FOR SOLUTION ORAL
Qty: 4000 ML | Refills: 0 | Status: SHIPPED | OUTPATIENT
Start: 2021-09-22 | End: 2021-09-23 | Stop reason: SDUPTHER

## 2021-09-23 RX ORDER — POLYETHYLENE GLYCOL 3350, SODIUM CHLORIDE, SODIUM BICARBONATE, POTASSIUM CHLORIDE 420; 11.2; 5.72; 1.48 G/4L; G/4L; G/4L; G/4L
POWDER, FOR SOLUTION ORAL
Qty: 4000 ML | Refills: 0 | Status: SHIPPED | OUTPATIENT
Start: 2021-09-23 | End: 2022-08-31 | Stop reason: ALTCHOICE

## 2021-10-05 RX ORDER — PANTOPRAZOLE SODIUM 40 MG/1
40 TABLET, DELAYED RELEASE ORAL DAILY
Qty: 30 TABLET | Refills: 11 | Status: SHIPPED | OUTPATIENT
Start: 2021-10-05 | End: 2022-03-30

## 2021-10-05 RX ORDER — SUCRALFATE 1 G/1
1 TABLET ORAL 4 TIMES DAILY PRN
Qty: 120 TABLET | Refills: 0 | Status: SHIPPED | OUTPATIENT
Start: 2021-10-05 | End: 2022-01-27

## 2021-10-11 ENCOUNTER — TELEPHONE (OUTPATIENT)
Dept: UROLOGY | Age: 56
End: 2021-10-11

## 2021-10-18 ENCOUNTER — APPOINTMENT (OUTPATIENT)
Dept: CT IMAGING | Age: 56
End: 2021-10-18
Attending: EMERGENCY MEDICINE

## 2021-10-18 ENCOUNTER — HOSPITAL ENCOUNTER (EMERGENCY)
Age: 56
Discharge: HOME | End: 2021-10-18
Attending: EMERGENCY MEDICINE

## 2021-10-18 VITALS
RESPIRATION RATE: 18 BRPM | BODY MASS INDEX: 36.4 KG/M2 | SYSTOLIC BLOOD PRESSURE: 130 MMHG | WEIGHT: 260 LBS | HEART RATE: 82 BPM | TEMPERATURE: 97.9 F | HEIGHT: 71 IN | DIASTOLIC BLOOD PRESSURE: 74 MMHG | OXYGEN SATURATION: 98 %

## 2021-10-18 DIAGNOSIS — N21.0 BLADDER STONE: Primary | ICD-10-CM

## 2021-10-18 LAB
ALBUMIN SERPL-MCNC: 3.9 G/DL (ref 3.6–5.1)
ALBUMIN/GLOB SERPL: 1.2 {RATIO} (ref 1–2.4)
ALP SERPL-CCNC: 76 UNITS/L (ref 45–117)
ALT SERPL-CCNC: 50 UNITS/L
ANION GAP SERPL CALC-SCNC: 12 MMOL/L (ref 10–20)
APPEARANCE UR: CLEAR
AST SERPL-CCNC: 22 UNITS/L
BACTERIA #/AREA URNS HPF: ABNORMAL /HPF
BASOPHILS # BLD: 0 K/MCL (ref 0–0.3)
BASOPHILS NFR BLD: 1 %
BILIRUB SERPL-MCNC: 0.9 MG/DL (ref 0.2–1)
BILIRUB UR QL STRIP: NEGATIVE
BUN SERPL-MCNC: 12 MG/DL (ref 6–20)
BUN/CREAT SERPL: 13 (ref 7–25)
CALCIUM SERPL-MCNC: 10.6 MG/DL (ref 8.4–10.2)
CHLORIDE SERPL-SCNC: 107 MMOL/L (ref 98–107)
CO2 SERPL-SCNC: 29 MMOL/L (ref 21–32)
COLOR UR: COLORLESS
CREAT SERPL-MCNC: 0.94 MG/DL (ref 0.67–1.17)
DEPRECATED RDW RBC: 45.2 FL (ref 39–50)
EOSINOPHIL # BLD: 0.2 K/MCL (ref 0–0.5)
EOSINOPHIL NFR BLD: 2 %
ERYTHROCYTE [DISTWIDTH] IN BLOOD: 13.4 % (ref 11–15)
FASTING DURATION TIME PATIENT: ABNORMAL H
GFR SERPLBLD BASED ON 1.73 SQ M-ARVRAT: >90 ML/MIN
GLOBULIN SER-MCNC: 3.3 G/DL (ref 2–4)
GLUCOSE SERPL-MCNC: 103 MG/DL (ref 70–99)
GLUCOSE UR STRIP-MCNC: NEGATIVE MG/DL
HCT VFR BLD CALC: 48 % (ref 39–51)
HGB BLD-MCNC: 16 G/DL (ref 13–17)
HGB UR QL STRIP: ABNORMAL
HYALINE CASTS #/AREA URNS LPF: ABNORMAL /LPF
IMM GRANULOCYTES # BLD AUTO: 0.2 K/MCL (ref 0–0.2)
IMM GRANULOCYTES # BLD: 2 %
KETONES UR STRIP-MCNC: NEGATIVE MG/DL
LEUKOCYTE ESTERASE UR QL STRIP: ABNORMAL
LIPASE SERPL-CCNC: 73 UNITS/L (ref 73–393)
LYMPHOCYTES # BLD: 1.3 K/MCL (ref 1–4)
LYMPHOCYTES NFR BLD: 16 %
MAGNESIUM SERPL-MCNC: 1.7 MG/DL (ref 1.7–2.4)
MCH RBC QN AUTO: 30.7 PG (ref 26–34)
MCHC RBC AUTO-ENTMCNC: 33.3 G/DL (ref 32–36.5)
MCV RBC AUTO: 92.1 FL (ref 78–100)
MONOCYTES # BLD: 0.9 K/MCL (ref 0.3–0.9)
MONOCYTES NFR BLD: 11 %
MUCOUS THREADS URNS QL MICRO: PRESENT
NEUTROPHILS # BLD: 5.6 K/MCL (ref 1.8–7.7)
NEUTROPHILS NFR BLD: 68 %
NITRITE UR QL STRIP: NEGATIVE
NRBC BLD MANUAL-RTO: 0 /100 WBC
PH UR STRIP: 8 [PH] (ref 5–7)
PLATELET # BLD AUTO: 292 K/MCL (ref 140–450)
POTASSIUM SERPL-SCNC: 4.1 MMOL/L (ref 3.4–5.1)
PROT SERPL-MCNC: 7.2 G/DL (ref 6.4–8.2)
PROT UR STRIP-MCNC: NEGATIVE MG/DL
RBC # BLD: 5.21 MIL/MCL (ref 4.5–5.9)
RBC #/AREA URNS HPF: ABNORMAL /HPF
SODIUM SERPL-SCNC: 144 MMOL/L (ref 135–145)
SP GR UR STRIP: <1.005 (ref 1–1.03)
SQUAMOUS #/AREA URNS HPF: ABNORMAL /HPF
UROBILINOGEN UR STRIP-MCNC: 0.2 MG/DL
WBC # BLD: 8.2 K/MCL (ref 4.2–11)
WBC #/AREA URNS HPF: ABNORMAL /HPF

## 2021-10-18 PROCEDURE — G1004 CDSM NDSC: HCPCS

## 2021-10-18 PROCEDURE — 83690 ASSAY OF LIPASE: CPT | Performed by: EMERGENCY MEDICINE

## 2021-10-18 PROCEDURE — 87086 URINE CULTURE/COLONY COUNT: CPT | Performed by: EMERGENCY MEDICINE

## 2021-10-18 PROCEDURE — 96361 HYDRATE IV INFUSION ADD-ON: CPT

## 2021-10-18 PROCEDURE — 36415 COLL VENOUS BLD VENIPUNCTURE: CPT

## 2021-10-18 PROCEDURE — 10002807 HB RX 258: Performed by: EMERGENCY MEDICINE

## 2021-10-18 PROCEDURE — G1004 CDSM NDSC: HCPCS | Performed by: RADIOLOGY

## 2021-10-18 PROCEDURE — 80053 COMPREHEN METABOLIC PANEL: CPT | Performed by: EMERGENCY MEDICINE

## 2021-10-18 PROCEDURE — 99285 EMERGENCY DEPT VISIT HI MDM: CPT | Performed by: EMERGENCY MEDICINE

## 2021-10-18 PROCEDURE — 85025 COMPLETE CBC W/AUTO DIFF WBC: CPT | Performed by: EMERGENCY MEDICINE

## 2021-10-18 PROCEDURE — 83735 ASSAY OF MAGNESIUM: CPT | Performed by: EMERGENCY MEDICINE

## 2021-10-18 PROCEDURE — 96374 THER/PROPH/DIAG INJ IV PUSH: CPT

## 2021-10-18 PROCEDURE — 99284 EMERGENCY DEPT VISIT MOD MDM: CPT

## 2021-10-18 PROCEDURE — 74176 CT ABD & PELVIS W/O CONTRAST: CPT | Performed by: RADIOLOGY

## 2021-10-18 PROCEDURE — 74176 CT ABD & PELVIS W/O CONTRAST: CPT

## 2021-10-18 PROCEDURE — 96375 TX/PRO/DX INJ NEW DRUG ADDON: CPT

## 2021-10-18 PROCEDURE — 81001 URINALYSIS AUTO W/SCOPE: CPT | Performed by: EMERGENCY MEDICINE

## 2021-10-18 PROCEDURE — 10002800 HB RX 250 W HCPCS: Performed by: EMERGENCY MEDICINE

## 2021-10-18 PROCEDURE — 10002803 HB RX 637: Performed by: EMERGENCY MEDICINE

## 2021-10-18 RX ORDER — KETOROLAC TROMETHAMINE 10 MG/1
10 TABLET, FILM COATED ORAL EVERY 6 HOURS PRN
Qty: 20 TABLET | Refills: 0 | Status: ON HOLD | OUTPATIENT
Start: 2021-10-18 | End: 2021-11-04 | Stop reason: HOSPADM

## 2021-10-18 RX ORDER — ONDANSETRON 4 MG/1
4 TABLET, ORALLY DISINTEGRATING ORAL EVERY 6 HOURS PRN
Qty: 25 TABLET | Refills: 0 | Status: ON HOLD | OUTPATIENT
Start: 2021-10-18 | End: 2021-11-04 | Stop reason: HOSPADM

## 2021-10-18 RX ORDER — HYDROCODONE BITARTRATE AND ACETAMINOPHEN 5; 325 MG/1; MG/1
1 TABLET ORAL EVERY 4 HOURS PRN
Qty: 8 TABLET | Refills: 0 | Status: SHIPPED | OUTPATIENT
Start: 2021-10-18 | End: 2022-08-31 | Stop reason: CLARIF

## 2021-10-18 RX ORDER — TAMSULOSIN HYDROCHLORIDE 0.4 MG/1
0.4 CAPSULE ORAL ONCE
Status: COMPLETED | OUTPATIENT
Start: 2021-10-18 | End: 2021-10-18

## 2021-10-18 RX ORDER — TAMSULOSIN HYDROCHLORIDE 0.4 MG/1
0.4 CAPSULE ORAL DAILY
Qty: 7 CAPSULE | Refills: 0 | Status: ON HOLD | OUTPATIENT
Start: 2021-10-18 | End: 2021-11-04 | Stop reason: HOSPADM

## 2021-10-18 RX ORDER — ONDANSETRON 2 MG/ML
4 INJECTION INTRAMUSCULAR; INTRAVENOUS ONCE
Status: COMPLETED | OUTPATIENT
Start: 2021-10-18 | End: 2021-10-18

## 2021-10-18 RX ADMIN — SODIUM CHLORIDE 1000 ML: 9 INJECTION, SOLUTION INTRAVENOUS at 13:42

## 2021-10-18 RX ADMIN — ONDANSETRON 4 MG: 2 INJECTION INTRAMUSCULAR; INTRAVENOUS at 13:42

## 2021-10-18 RX ADMIN — KETOROLAC TROMETHAMINE 15 MG: 15 INJECTION, SOLUTION INTRAMUSCULAR; INTRAVENOUS at 13:46

## 2021-10-18 RX ADMIN — TAMSULOSIN HYDROCHLORIDE 0.4 MG: 0.4 CAPSULE ORAL at 15:33

## 2021-10-18 ASSESSMENT — PAIN SCALES - GENERAL
PAINLEVEL_OUTOF10: 6
PAINLEVEL_OUTOF10: 5
PAINLEVEL_OUTOF10: 7

## 2021-10-19 ENCOUNTER — ANESTHESIA EVENT (OUTPATIENT)
Dept: SURGERY | Age: 56
End: 2021-10-19

## 2021-10-19 ENCOUNTER — OFFICE VISIT (OUTPATIENT)
Dept: UROLOGY | Age: 56
End: 2021-10-19

## 2021-10-19 ENCOUNTER — PREP FOR CASE (OUTPATIENT)
Dept: UROLOGY | Age: 56
End: 2021-10-19

## 2021-10-19 VITALS — DIASTOLIC BLOOD PRESSURE: 62 MMHG | RESPIRATION RATE: 16 BRPM | TEMPERATURE: 96.4 F | SYSTOLIC BLOOD PRESSURE: 124 MMHG

## 2021-10-19 DIAGNOSIS — N21.0 CALCULUS IN BLADDER: Primary | ICD-10-CM

## 2021-10-19 LAB — BACTERIA UR CULT: NORMAL

## 2021-10-19 PROCEDURE — 99213 OFFICE O/P EST LOW 20 MIN: CPT | Performed by: UROLOGY

## 2021-10-19 RX ORDER — 0.9 % SODIUM CHLORIDE 0.9 %
2 VIAL (ML) INJECTION EVERY 12 HOURS SCHEDULED
Status: CANCELLED | OUTPATIENT
Start: 2021-10-19

## 2021-10-19 ASSESSMENT — ACTIVITIES OF DAILY LIVING (ADL)
ADL_SCORE: 12
CHRONIC_PAIN_PRESENT: NO
ADL_BEFORE_ADMISSION: INDEPENDENT
HISTORY OF FALLING IN THE LAST YEAR (PRIOR TO ADMISSION): NO

## 2021-10-20 ENCOUNTER — HOSPITAL ENCOUNTER (OUTPATIENT)
Age: 56
Discharge: HOME | End: 2021-10-20
Attending: UROLOGY | Admitting: UROLOGY

## 2021-10-20 ENCOUNTER — ANESTHESIA (OUTPATIENT)
Dept: SURGERY | Age: 56
End: 2021-10-20

## 2021-10-20 DIAGNOSIS — N21.0 CALCULUS IN BLADDER: ICD-10-CM

## 2021-10-20 LAB
SARS-COV-2 RNA RESP QL NAA+PROBE: NOT DETECTED
SERVICE CMNT-IMP: NORMAL
SERVICE CMNT-IMP: NORMAL

## 2021-10-20 PROCEDURE — 10002767 HB EXTENDED RECOVERY PER HOUR: Performed by: UROLOGY

## 2021-10-20 PROCEDURE — 10002801 HB RX 250 W/O HCPCS: Performed by: NURSE ANESTHETIST, CERTIFIED REGISTERED

## 2021-10-20 PROCEDURE — 10002117 HB ADDITIONAL SURGERY TIME/30 MIN: Performed by: UROLOGY

## 2021-10-20 PROCEDURE — 10004348 HB COUNTER-EVAL PRE-OP

## 2021-10-20 PROCEDURE — 82365 CALCULUS SPECTROSCOPY: CPT | Performed by: UROLOGY

## 2021-10-20 PROCEDURE — A52317 ANES LITH: CRUSH CALCU-BLADDER; SIMPL/SM: Performed by: NURSE ANESTHETIST, CERTIFIED REGISTERED

## 2021-10-20 PROCEDURE — 10006023 HB SUPPLY 272: Performed by: UROLOGY

## 2021-10-20 PROCEDURE — 10002358 HB ANESTH GENERAL 1ST 1/2 HR: Performed by: UROLOGY

## 2021-10-20 PROCEDURE — 10002807 HB RX 258: Performed by: NURSE ANESTHETIST, CERTIFIED REGISTERED

## 2021-10-20 PROCEDURE — 10002359 HB ANESTH GENERAL ADD'L 1/2 HR: Performed by: UROLOGY

## 2021-10-20 PROCEDURE — 10002800 HB RX 250 W HCPCS: Performed by: UROLOGY

## 2021-10-20 PROCEDURE — 87635 SARS-COV-2 COVID-19 AMP PRB: CPT | Performed by: NURSE PRACTITIONER

## 2021-10-20 PROCEDURE — 10002803 HB RX 637: Performed by: UROLOGY

## 2021-10-20 PROCEDURE — 10004451 HB PACU RECOVERY 1ST 30 MINUTES: Performed by: UROLOGY

## 2021-10-20 PROCEDURE — 10002803 HB RX 637: Performed by: NURSE ANESTHETIST, CERTIFIED REGISTERED

## 2021-10-20 PROCEDURE — 10004281 HB COUNTER-STAFF TIME PER 15 MIN

## 2021-10-20 PROCEDURE — 10002800 HB RX 250 W HCPCS: Performed by: NURSE ANESTHETIST, CERTIFIED REGISTERED

## 2021-10-20 PROCEDURE — 52317 REMOVE BLADDER STONE: CPT | Performed by: UROLOGY

## 2021-10-20 PROCEDURE — 10002801 HB RX 250 W/O HCPCS: Performed by: UROLOGY

## 2021-10-20 PROCEDURE — 10004316 HB COUNTER-PREP

## 2021-10-20 PROCEDURE — 10006390 HB BASIC PROCEDURE: Performed by: UROLOGY

## 2021-10-20 RX ORDER — LIDOCAINE HYDROCHLORIDE 20 MG/ML
INJECTION, SOLUTION INFILTRATION; PERINEURAL PRN
Status: DISCONTINUED | OUTPATIENT
Start: 2021-10-20 | End: 2021-10-20

## 2021-10-20 RX ORDER — LIDOCAINE HYDROCHLORIDE 10 MG/ML
5-10 INJECTION, SOLUTION INFILTRATION; PERINEURAL PRN
Status: DISCONTINUED | OUTPATIENT
Start: 2021-10-20 | End: 2021-10-20 | Stop reason: HOSPADM

## 2021-10-20 RX ORDER — DEXAMETHASONE SODIUM PHOSPHATE 4 MG/ML
INJECTION, SOLUTION INTRA-ARTICULAR; INTRALESIONAL; INTRAMUSCULAR; INTRAVENOUS; SOFT TISSUE PRN
Status: DISCONTINUED | OUTPATIENT
Start: 2021-10-20 | End: 2021-10-20

## 2021-10-20 RX ORDER — SODIUM CHLORIDE, SODIUM LACTATE, POTASSIUM CHLORIDE, CALCIUM CHLORIDE 600; 310; 30; 20 MG/100ML; MG/100ML; MG/100ML; MG/100ML
INJECTION, SOLUTION INTRAVENOUS CONTINUOUS PRN
Status: DISCONTINUED | OUTPATIENT
Start: 2021-10-20 | End: 2021-10-20

## 2021-10-20 RX ORDER — CIPROFLOXACIN 2 MG/ML
400 INJECTION, SOLUTION INTRAVENOUS
Status: COMPLETED | OUTPATIENT
Start: 2021-10-20 | End: 2021-10-20

## 2021-10-20 RX ORDER — ONDANSETRON 2 MG/ML
4 INJECTION INTRAMUSCULAR; INTRAVENOUS 2 TIMES DAILY PRN
Status: DISCONTINUED | OUTPATIENT
Start: 2021-10-20 | End: 2021-10-20 | Stop reason: HOSPADM

## 2021-10-20 RX ORDER — 0.9 % SODIUM CHLORIDE 0.9 %
2 VIAL (ML) INJECTION EVERY 12 HOURS SCHEDULED
Status: DISCONTINUED | OUTPATIENT
Start: 2021-10-20 | End: 2021-10-20 | Stop reason: HOSPADM

## 2021-10-20 RX ORDER — ONDANSETRON 4 MG/1
4 TABLET, ORALLY DISINTEGRATING ORAL
Status: COMPLETED | OUTPATIENT
Start: 2021-10-20 | End: 2021-10-20

## 2021-10-20 RX ORDER — ATROPA BELLADONNA AND OPIUM 16.2; 6 MG/1; MG/1
SUPPOSITORY RECTAL PRN
Status: DISCONTINUED | OUTPATIENT
Start: 2021-10-20 | End: 2021-10-20 | Stop reason: HOSPADM

## 2021-10-20 RX ORDER — DIPHENHYDRAMINE HYDROCHLORIDE 50 MG/ML
12.5 INJECTION INTRAMUSCULAR; INTRAVENOUS EVERY 4 HOURS PRN
Status: DISCONTINUED | OUTPATIENT
Start: 2021-10-20 | End: 2021-10-20 | Stop reason: HOSPADM

## 2021-10-20 RX ORDER — ACETAMINOPHEN 650 MG/1
650 SUPPOSITORY RECTAL EVERY 4 HOURS PRN
Status: DISCONTINUED | OUTPATIENT
Start: 2021-10-20 | End: 2021-10-20 | Stop reason: HOSPADM

## 2021-10-20 RX ORDER — NALOXONE HCL 0.4 MG/ML
0.2 VIAL (ML) INJECTION EVERY 5 MIN PRN
Status: DISCONTINUED | OUTPATIENT
Start: 2021-10-20 | End: 2021-10-20 | Stop reason: HOSPADM

## 2021-10-20 RX ORDER — METOCLOPRAMIDE HYDROCHLORIDE 5 MG/ML
5 INJECTION INTRAMUSCULAR; INTRAVENOUS EVERY 6 HOURS PRN
Status: DISCONTINUED | OUTPATIENT
Start: 2021-10-20 | End: 2021-10-20 | Stop reason: HOSPADM

## 2021-10-20 RX ORDER — EPHEDRINE SULFATE/0.9% NACL/PF 50 MG/10ML
5 SYRINGE (ML) INTRAVENOUS
Status: DISCONTINUED | OUTPATIENT
Start: 2021-10-20 | End: 2021-10-20 | Stop reason: HOSPADM

## 2021-10-20 RX ORDER — MAGNESIUM HYDROXIDE 1200 MG/15ML
LIQUID ORAL PRN
Status: DISCONTINUED | OUTPATIENT
Start: 2021-10-20 | End: 2021-10-20 | Stop reason: HOSPADM

## 2021-10-20 RX ORDER — LIDOCAINE HYDROCHLORIDE 20 MG/ML
JELLY TOPICAL PRN
Status: DISCONTINUED | OUTPATIENT
Start: 2021-10-20 | End: 2021-10-20 | Stop reason: HOSPADM

## 2021-10-20 RX ORDER — HYDRALAZINE HYDROCHLORIDE 20 MG/ML
5 INJECTION INTRAMUSCULAR; INTRAVENOUS EVERY 10 MIN PRN
Status: DISCONTINUED | OUTPATIENT
Start: 2021-10-20 | End: 2021-10-20 | Stop reason: HOSPADM

## 2021-10-20 RX ORDER — SODIUM CHLORIDE, SODIUM LACTATE, POTASSIUM CHLORIDE, CALCIUM CHLORIDE 600; 310; 30; 20 MG/100ML; MG/100ML; MG/100ML; MG/100ML
INJECTION, SOLUTION INTRAVENOUS CONTINUOUS
Status: DISCONTINUED | OUTPATIENT
Start: 2021-10-20 | End: 2021-10-20 | Stop reason: HOSPADM

## 2021-10-20 RX ORDER — ACETAMINOPHEN 325 MG/1
650 TABLET ORAL EVERY 4 HOURS PRN
Status: DISCONTINUED | OUTPATIENT
Start: 2021-10-20 | End: 2021-10-20 | Stop reason: HOSPADM

## 2021-10-20 RX ORDER — PROPOFOL 10 MG/ML
INJECTION, EMULSION INTRAVENOUS PRN
Status: DISCONTINUED | OUTPATIENT
Start: 2021-10-20 | End: 2021-10-20

## 2021-10-20 RX ADMIN — LIDOCAINE HYDROCHLORIDE 100 MG: 20 INJECTION, SOLUTION INFILTRATION; PERINEURAL at 12:29

## 2021-10-20 RX ADMIN — DEXAMETHASONE SODIUM PHOSPHATE 4 MG: 4 INJECTION, SOLUTION INTRAMUSCULAR; INTRAVENOUS at 12:35

## 2021-10-20 RX ADMIN — CIPROFLOXACIN 400 MG: 2 INJECTION, SOLUTION INTRAVENOUS at 11:43

## 2021-10-20 RX ADMIN — SODIUM CHLORIDE, POTASSIUM CHLORIDE, SODIUM LACTATE AND CALCIUM CHLORIDE: 600; 310; 30; 20 INJECTION, SOLUTION INTRAVENOUS at 11:02

## 2021-10-20 RX ADMIN — PROPOFOL 200 MG: 10 INJECTION, EMULSION INTRAVENOUS at 12:29

## 2021-10-20 RX ADMIN — FENTANYL CITRATE 50 MCG: 50 INJECTION, SOLUTION INTRAMUSCULAR; INTRAVENOUS at 12:35

## 2021-10-20 RX ADMIN — SODIUM CHLORIDE, POTASSIUM CHLORIDE, SODIUM LACTATE AND CALCIUM CHLORIDE: 600; 310; 30; 20 INJECTION, SOLUTION INTRAVENOUS at 12:35

## 2021-10-20 RX ADMIN — ONDANSETRON 4 MG: 4 TABLET, ORALLY DISINTEGRATING ORAL at 11:41

## 2021-10-20 RX ADMIN — FENTANYL CITRATE 50 MCG: 50 INJECTION, SOLUTION INTRAMUSCULAR; INTRAVENOUS at 12:39

## 2021-10-20 ASSESSMENT — PAIN SCALES - GENERAL
PAINLEVEL_OUTOF10: 0
PAINLEVEL_OUTOF10: 7
PAINLEVEL_OUTOF10: 2
PAINLEVEL_OUTOF10: 1
PAINLEVEL_OUTOF10: 4

## 2021-10-21 VITALS
BODY MASS INDEX: 36.94 KG/M2 | HEART RATE: 76 BPM | WEIGHT: 263.89 LBS | DIASTOLIC BLOOD PRESSURE: 80 MMHG | SYSTOLIC BLOOD PRESSURE: 141 MMHG | OXYGEN SATURATION: 98 % | RESPIRATION RATE: 16 BRPM | TEMPERATURE: 97.5 F | HEIGHT: 71 IN

## 2021-10-23 LAB
APPEARANCE STONE: NORMAL
COMPN STONE: NORMAL
NUMBER STONE: NORMAL
SIZE STONE: NORMAL MM
SPECIMEN WT: 400 MG

## 2021-10-28 DIAGNOSIS — Z86.010 HISTORY OF COLON POLYPS: Primary | ICD-10-CM

## 2021-10-28 RX ORDER — SODIUM CHLORIDE 9 MG/ML
INJECTION, SOLUTION INTRAVENOUS CONTINUOUS
Status: CANCELLED | OUTPATIENT
Start: 2021-10-28

## 2021-10-28 ASSESSMENT — ACTIVITIES OF DAILY LIVING (ADL)
ADL_SCORE: 12
HISTORY OF FALLING IN THE LAST YEAR (PRIOR TO ADMISSION): NO
CHRONIC_PAIN_PRESENT: NO
ADL_BEFORE_ADMISSION: INDEPENDENT
RECENT_DECLINE_ADL: NO
NEEDS_ASSIST: NO
ADL_SHORT_OF_BREATH: NO

## 2021-11-01 ENCOUNTER — LAB SERVICES (OUTPATIENT)
Dept: LAB | Age: 56
End: 2021-11-01

## 2021-11-01 DIAGNOSIS — Z01.812 PRE-PROCEDURAL LABORATORY EXAMINATIONS: ICD-10-CM

## 2021-11-01 PROCEDURE — U0003 INFECTIOUS AGENT DETECTION BY NUCLEIC ACID (DNA OR RNA); SEVERE ACUTE RESPIRATORY SYNDROME CORONAVIRUS 2 (SARS-COV-2) (CORONAVIRUS DISEASE [COVID-19]), AMPLIFIED PROBE TECHNIQUE, MAKING USE OF HIGH THROUGHPUT TECHNOLOGIES AS DESCRIBED BY CMS-2020-01-R: HCPCS | Performed by: INTERNAL MEDICINE

## 2021-11-01 PROCEDURE — U0005 INFEC AGEN DETEC AMPLI PROBE: HCPCS | Performed by: INTERNAL MEDICINE

## 2021-11-02 LAB
SARS-COV-2 RNA RESP QL NAA+PROBE: NOT DETECTED
SERVICE CMNT-IMP: NORMAL
SERVICE CMNT-IMP: NORMAL

## 2021-11-03 ENCOUNTER — ANESTHESIA EVENT (OUTPATIENT)
Dept: SURGERY | Age: 56
End: 2021-11-03

## 2021-11-04 ENCOUNTER — ANESTHESIA (OUTPATIENT)
Dept: SURGERY | Age: 56
End: 2021-11-04

## 2021-11-04 ENCOUNTER — HOSPITAL ENCOUNTER (OUTPATIENT)
Age: 56
Discharge: HOME | End: 2021-11-04
Attending: INTERNAL MEDICINE | Admitting: INTERNAL MEDICINE

## 2021-11-04 VITALS
HEART RATE: 94 BPM | HEIGHT: 71 IN | WEIGHT: 256 LBS | RESPIRATION RATE: 19 BRPM | OXYGEN SATURATION: 94 % | DIASTOLIC BLOOD PRESSURE: 75 MMHG | SYSTOLIC BLOOD PRESSURE: 124 MMHG | BODY MASS INDEX: 35.84 KG/M2 | TEMPERATURE: 98.1 F

## 2021-11-04 DIAGNOSIS — Z86.010 HISTORY OF COLON POLYPS: ICD-10-CM

## 2021-11-04 PROCEDURE — A45385 ANES COLONOSCOPY FLEX; W/REMOV TUMOR/LES: Performed by: ANESTHESIOLOGY

## 2021-11-04 PROCEDURE — 10003428 HB COLONOSCOPY

## 2021-11-04 PROCEDURE — 10002358 HB ANESTH GENERAL 1ST 1/2 HR: Performed by: INTERNAL MEDICINE

## 2021-11-04 PROCEDURE — 10004560 HB COUNTER-EXTENDED RECOVERY PER HOUR

## 2021-11-04 PROCEDURE — 45385 COLONOSCOPY W/LESION REMOVAL: CPT | Performed by: INTERNAL MEDICINE

## 2021-11-04 PROCEDURE — 45380 COLONOSCOPY AND BIOPSY: CPT | Performed by: INTERNAL MEDICINE

## 2021-11-04 PROCEDURE — 10002359 HB ANESTH GENERAL ADD'L 1/2 HR: Performed by: INTERNAL MEDICINE

## 2021-11-04 PROCEDURE — 88305 TISSUE EXAM BY PATHOLOGIST: CPT | Performed by: INTERNAL MEDICINE

## 2021-11-04 PROCEDURE — 10002800 HB RX 250 W HCPCS: Performed by: ANESTHESIOLOGIST ASSISTANT

## 2021-11-04 PROCEDURE — A45385 ANES COLONOSCOPY FLEX; W/REMOV TUMOR/LES: Performed by: ANESTHESIOLOGIST ASSISTANT

## 2021-11-04 PROCEDURE — 10002807 HB RX 258: Performed by: ANESTHESIOLOGIST ASSISTANT

## 2021-11-04 PROCEDURE — 10003428 HB COLONOSCOPY: Performed by: INTERNAL MEDICINE

## 2021-11-04 RX ORDER — HUMAN INSULIN 100 [IU]/ML
INJECTION, SOLUTION SUBCUTANEOUS
Status: DISCONTINUED | OUTPATIENT
Start: 2021-11-04 | End: 2021-11-04 | Stop reason: HOSPADM

## 2021-11-04 RX ORDER — DIPHENHYDRAMINE HYDROCHLORIDE 50 MG/ML
25 INJECTION INTRAMUSCULAR; INTRAVENOUS EVERY 4 HOURS PRN
Status: DISCONTINUED | OUTPATIENT
Start: 2021-11-04 | End: 2021-11-04 | Stop reason: HOSPADM

## 2021-11-04 RX ORDER — SODIUM CHLORIDE, SODIUM LACTATE, POTASSIUM CHLORIDE, CALCIUM CHLORIDE 600; 310; 30; 20 MG/100ML; MG/100ML; MG/100ML; MG/100ML
INJECTION, SOLUTION INTRAVENOUS CONTINUOUS PRN
Status: DISCONTINUED | OUTPATIENT
Start: 2021-11-04 | End: 2021-11-04

## 2021-11-04 RX ORDER — SODIUM CHLORIDE 9 MG/ML
INJECTION, SOLUTION INTRAVENOUS CONTINUOUS
Status: DISCONTINUED | OUTPATIENT
Start: 2021-11-04 | End: 2021-11-04 | Stop reason: HOSPADM

## 2021-11-04 RX ORDER — ALBUTEROL SULFATE 2.5 MG/3ML
2.5 SOLUTION RESPIRATORY (INHALATION)
Status: DISCONTINUED | OUTPATIENT
Start: 2021-11-04 | End: 2021-11-04 | Stop reason: HOSPADM

## 2021-11-04 RX ORDER — DEXTROSE MONOHYDRATE 50 MG/ML
INJECTION, SOLUTION INTRAVENOUS CONTINUOUS PRN
Status: DISCONTINUED | OUTPATIENT
Start: 2021-11-04 | End: 2021-11-04 | Stop reason: HOSPADM

## 2021-11-04 RX ORDER — ACETAMINOPHEN 650 MG/1
650 SUPPOSITORY RECTAL EVERY 4 HOURS PRN
Status: CANCELLED | OUTPATIENT
Start: 2021-11-04

## 2021-11-04 RX ORDER — SODIUM CHLORIDE, SODIUM LACTATE, POTASSIUM CHLORIDE, CALCIUM CHLORIDE 600; 310; 30; 20 MG/100ML; MG/100ML; MG/100ML; MG/100ML
INJECTION, SOLUTION INTRAVENOUS CONTINUOUS
Status: DISCONTINUED | OUTPATIENT
Start: 2021-11-04 | End: 2021-11-04 | Stop reason: HOSPADM

## 2021-11-04 RX ORDER — DIPHENHYDRAMINE HYDROCHLORIDE 50 MG/ML
25 INJECTION INTRAMUSCULAR; INTRAVENOUS
Status: DISCONTINUED | OUTPATIENT
Start: 2021-11-04 | End: 2021-11-04 | Stop reason: HOSPADM

## 2021-11-04 RX ORDER — LIDOCAINE HYDROCHLORIDE 10 MG/ML
5-10 INJECTION, SOLUTION INFILTRATION; PERINEURAL PRN
Status: DISCONTINUED | OUTPATIENT
Start: 2021-11-04 | End: 2021-11-04 | Stop reason: HOSPADM

## 2021-11-04 RX ORDER — ACETAMINOPHEN 325 MG/1
650 TABLET ORAL EVERY 4 HOURS PRN
Status: CANCELLED | OUTPATIENT
Start: 2021-11-04

## 2021-11-04 RX ORDER — DEXTROSE MONOHYDRATE 25 G/50ML
25 INJECTION, SOLUTION INTRAVENOUS PRN
Status: DISCONTINUED | OUTPATIENT
Start: 2021-11-04 | End: 2021-11-04 | Stop reason: HOSPADM

## 2021-11-04 RX ORDER — 0.9 % SODIUM CHLORIDE 0.9 %
2 VIAL (ML) INJECTION EVERY 12 HOURS SCHEDULED
Status: DISCONTINUED | OUTPATIENT
Start: 2021-11-04 | End: 2021-11-04 | Stop reason: HOSPADM

## 2021-11-04 RX ORDER — ONDANSETRON 2 MG/ML
4 INJECTION INTRAMUSCULAR; INTRAVENOUS EVERY 12 HOURS PRN
Status: CANCELLED | OUTPATIENT
Start: 2021-11-04

## 2021-11-04 RX ORDER — ONDANSETRON 4 MG/1
4 TABLET, ORALLY DISINTEGRATING ORAL EVERY 12 HOURS PRN
Status: CANCELLED | OUTPATIENT
Start: 2021-11-04

## 2021-11-04 RX ORDER — PROCHLORPERAZINE EDISYLATE 5 MG/ML
5 INJECTION INTRAMUSCULAR; INTRAVENOUS EVERY 4 HOURS PRN
Status: DISCONTINUED | OUTPATIENT
Start: 2021-11-04 | End: 2021-11-04 | Stop reason: HOSPADM

## 2021-11-04 RX ORDER — PROPOFOL 10 MG/ML
INJECTION, EMULSION INTRAVENOUS PRN
Status: DISCONTINUED | OUTPATIENT
Start: 2021-11-04 | End: 2021-11-04

## 2021-11-04 RX ORDER — ONDANSETRON 2 MG/ML
4 INJECTION INTRAMUSCULAR; INTRAVENOUS 2 TIMES DAILY PRN
Status: DISCONTINUED | OUTPATIENT
Start: 2021-11-04 | End: 2021-11-04 | Stop reason: HOSPADM

## 2021-11-04 RX ORDER — HYDRALAZINE HYDROCHLORIDE 20 MG/ML
5 INJECTION INTRAMUSCULAR; INTRAVENOUS EVERY 10 MIN PRN
Status: DISCONTINUED | OUTPATIENT
Start: 2021-11-04 | End: 2021-11-04 | Stop reason: HOSPADM

## 2021-11-04 RX ORDER — NICOTINE POLACRILEX 4 MG
30 LOZENGE BUCCAL
Status: DISCONTINUED | OUTPATIENT
Start: 2021-11-04 | End: 2021-11-04 | Stop reason: HOSPADM

## 2021-11-04 RX ADMIN — PROPOFOL 150 MCG/KG/MIN: 10 INJECTION, EMULSION INTRAVENOUS at 11:26

## 2021-11-04 RX ADMIN — SODIUM CHLORIDE, POTASSIUM CHLORIDE, SODIUM LACTATE AND CALCIUM CHLORIDE: 600; 310; 30; 20 INJECTION, SOLUTION INTRAVENOUS at 11:23

## 2021-11-04 RX ADMIN — PROPOFOL 50 MG: 10 INJECTION, EMULSION INTRAVENOUS at 11:26

## 2021-11-04 SDOH — SOCIAL STABILITY: SOCIAL INSECURITY: RISK FACTORS: KIDNEY DISEASE

## 2021-11-04 SDOH — SOCIAL STABILITY: SOCIAL INSECURITY: RISK FACTORS: BMI> 30 (OBESITY)

## 2021-11-04 ASSESSMENT — PAIN SCALES - GENERAL
PAINLEVEL_OUTOF10: 0

## 2021-11-04 ASSESSMENT — ENCOUNTER SYMPTOMS: EXERCISE TOLERANCE: GOOD (>4 METS)

## 2021-11-09 ENCOUNTER — TELEPHONE (OUTPATIENT)
Dept: GASTROENTEROLOGY | Age: 56
End: 2021-11-09

## 2021-11-15 RX ORDER — SUCRALFATE 1 G/1
1 TABLET ORAL 4 TIMES DAILY PRN
Qty: 120 TABLET | Refills: 0 | OUTPATIENT
Start: 2021-11-15

## 2021-11-17 PROBLEM — K64.9 HEMORRHOIDS: Status: ACTIVE | Noted: 2021-11-04

## 2021-12-06 ENCOUNTER — TELEPHONE (OUTPATIENT)
Dept: UROLOGY | Age: 56
End: 2021-12-06

## 2021-12-27 ENCOUNTER — TELEPHONE (OUTPATIENT)
Dept: UROLOGY | Age: 56
End: 2021-12-27

## 2022-01-27 RX ORDER — SUCRALFATE 1 G/1
1 TABLET ORAL 4 TIMES DAILY PRN
Qty: 120 TABLET | Refills: 0 | Status: SHIPPED | OUTPATIENT
Start: 2022-01-27 | End: 2022-01-28 | Stop reason: SDUPTHER

## 2022-01-28 RX ORDER — SUCRALFATE 1 G/1
1 TABLET ORAL 4 TIMES DAILY PRN
Qty: 120 TABLET | Refills: 0 | Status: SHIPPED | OUTPATIENT
Start: 2022-01-28 | End: 2023-01-11 | Stop reason: SDUPTHER

## 2022-03-30 RX ORDER — PANTOPRAZOLE SODIUM 40 MG/1
TABLET, DELAYED RELEASE ORAL
Qty: 90 TABLET | Refills: 3 | Status: SHIPPED | OUTPATIENT
Start: 2022-03-30 | End: 2023-01-11

## 2022-08-09 ENCOUNTER — TELEPHONE (OUTPATIENT)
Dept: UROLOGY | Age: 57
End: 2022-08-09

## 2022-08-31 ENCOUNTER — OFFICE VISIT (OUTPATIENT)
Dept: DERMATOLOGY | Age: 57
End: 2022-08-31

## 2022-08-31 DIAGNOSIS — D48.5 NEOPLASM OF UNCERTAIN BEHAVIOR OF SKIN: Primary | ICD-10-CM

## 2022-08-31 DIAGNOSIS — L57.0 AK (ACTINIC KERATOSIS): ICD-10-CM

## 2022-08-31 DIAGNOSIS — D23.5 BENIGN NEOPLASM OF SKIN OF TRUNK, EXCEPT SCROTUM: ICD-10-CM

## 2022-08-31 DIAGNOSIS — L30.4 INTERTRIGO: ICD-10-CM

## 2022-08-31 DIAGNOSIS — D18.01 HEMANGIOMA OF SKIN AND SUBCUTANEOUS TISSUE: ICD-10-CM

## 2022-08-31 PROCEDURE — 11301 SHAVE SKIN LESION 0.6-1.0 CM: CPT | Performed by: DERMATOLOGY

## 2022-08-31 PROCEDURE — 99203 OFFICE O/P NEW LOW 30 MIN: CPT | Performed by: DERMATOLOGY

## 2022-09-09 ENCOUNTER — TELEPHONE (OUTPATIENT)
Dept: DERMATOLOGY | Age: 57
End: 2022-09-09

## 2023-01-12 RX ORDER — SUCRALFATE 1 G/1
TABLET ORAL
Qty: 360 TABLET | OUTPATIENT
Start: 2023-01-12

## 2023-02-09 RX ORDER — SUCRALFATE 1 G/1
TABLET ORAL
Qty: 120 TABLET | Refills: 0 | OUTPATIENT
Start: 2023-02-09

## 2023-05-03 ENCOUNTER — ANESTHESIA EVENT (OUTPATIENT)
Dept: PERIOP | Facility: HOSPITAL | Age: 58
End: 2023-05-03
Payer: COMMERCIAL

## 2023-05-03 ENCOUNTER — APPOINTMENT (OUTPATIENT)
Dept: CT IMAGING | Facility: HOSPITAL | Age: 58
End: 2023-05-03
Payer: COMMERCIAL

## 2023-05-03 ENCOUNTER — APPOINTMENT (OUTPATIENT)
Dept: ULTRASOUND IMAGING | Facility: HOSPITAL | Age: 58
End: 2023-05-03
Payer: COMMERCIAL

## 2023-05-03 ENCOUNTER — HOSPITAL ENCOUNTER (OUTPATIENT)
Facility: HOSPITAL | Age: 58
Discharge: HOME | End: 2023-05-07
Attending: EMERGENCY MEDICINE | Admitting: SPECIALIST
Payer: COMMERCIAL

## 2023-05-03 ENCOUNTER — APPOINTMENT (OUTPATIENT)
Dept: GENERAL RADIOLOGY | Facility: HOSPITAL | Age: 58
End: 2023-05-03
Payer: COMMERCIAL

## 2023-05-03 DIAGNOSIS — K76.89 LIVER NODULE: Primary | ICD-10-CM

## 2023-05-03 DIAGNOSIS — K81.0 ACUTE CHOLECYSTITIS: ICD-10-CM

## 2023-05-03 LAB
ALBUMIN SERPL-MCNC: 4.7 G/DL (ref 3.5–5.2)
ALBUMIN/GLOB SERPL: 1.6 G/DL
ALP SERPL-CCNC: 89 U/L (ref 39–117)
ALT SERPL W P-5'-P-CCNC: 19 U/L (ref 1–41)
ANION GAP SERPL CALCULATED.3IONS-SCNC: 8 MMOL/L (ref 5–15)
AST SERPL-CCNC: 20 U/L (ref 1–40)
BASOPHILS # BLD AUTO: 0.1 10*3/MM3 (ref 0–0.2)
BASOPHILS NFR BLD AUTO: 0.7 % (ref 0–1.5)
BILIRUB SERPL-MCNC: 0.7 MG/DL (ref 0–1.2)
BILIRUB UR QL STRIP: NEGATIVE
BUN SERPL-MCNC: 14 MG/DL (ref 6–20)
BUN/CREAT SERPL: 17.1 (ref 7–25)
CALCIUM SPEC-SCNC: 11.1 MG/DL (ref 8.6–10.5)
CHLORIDE SERPL-SCNC: 104 MMOL/L (ref 98–107)
CLARITY UR: CLEAR
CO2 SERPL-SCNC: 29 MMOL/L (ref 22–29)
COLOR UR: YELLOW
CREAT SERPL-MCNC: 0.82 MG/DL (ref 0.76–1.27)
DEPRECATED RDW RBC AUTO: 45.1 FL (ref 37–54)
EGFRCR SERPLBLD CKD-EPI 2021: 102.5 ML/MIN/1.73
EOSINOPHIL # BLD AUTO: 0.24 10*3/MM3 (ref 0–0.4)
EOSINOPHIL NFR BLD AUTO: 1.8 % (ref 0.3–6.2)
ERYTHROCYTE [DISTWIDTH] IN BLOOD BY AUTOMATED COUNT: 13.2 % (ref 12.3–15.4)
GLOBULIN UR ELPH-MCNC: 3 GM/DL
GLUCOSE SERPL-MCNC: 147 MG/DL (ref 65–99)
GLUCOSE UR STRIP-MCNC: NEGATIVE MG/DL
HCT VFR BLD AUTO: 49.5 % (ref 37.5–51)
HGB BLD-MCNC: 15.8 G/DL (ref 13–17.7)
HGB UR QL STRIP.AUTO: NEGATIVE
IMM GRANULOCYTES # BLD AUTO: 0.41 10*3/MM3 (ref 0–0.05)
IMM GRANULOCYTES NFR BLD AUTO: 3 % (ref 0–0.5)
KETONES UR QL STRIP: NEGATIVE
LEUKOCYTE ESTERASE UR QL STRIP.AUTO: NEGATIVE
LIPASE SERPL-CCNC: 25 U/L (ref 13–60)
LYMPHOCYTES # BLD AUTO: 1.27 10*3/MM3 (ref 0.7–3.1)
LYMPHOCYTES NFR BLD AUTO: 9.3 % (ref 19.6–45.3)
MCH RBC QN AUTO: 29.8 PG (ref 26.6–33)
MCHC RBC AUTO-ENTMCNC: 31.9 G/DL (ref 31.5–35.7)
MCV RBC AUTO: 93.2 FL (ref 79–97)
MONOCYTES # BLD AUTO: 1.24 10*3/MM3 (ref 0.1–0.9)
MONOCYTES NFR BLD AUTO: 9 % (ref 5–12)
NEUTROPHILS NFR BLD AUTO: 10.45 10*3/MM3 (ref 1.7–7)
NEUTROPHILS NFR BLD AUTO: 76.2 % (ref 42.7–76)
NITRITE UR QL STRIP: NEGATIVE
NRBC BLD AUTO-RTO: 0 /100 WBC (ref 0–0.2)
PH UR STRIP.AUTO: 6.5 [PH] (ref 5–8)
PLATELET # BLD AUTO: 387 10*3/MM3 (ref 140–450)
PMV BLD AUTO: 9.7 FL (ref 6–12)
POTASSIUM SERPL-SCNC: 4.5 MMOL/L (ref 3.5–5.2)
PROT SERPL-MCNC: 7.7 G/DL (ref 6–8.5)
PROT UR QL STRIP: NEGATIVE
RBC # BLD AUTO: 5.31 10*6/MM3 (ref 4.14–5.8)
SODIUM SERPL-SCNC: 141 MMOL/L (ref 136–145)
SP GR UR STRIP: 1.01 (ref 1–1.03)
TROPONIN T SERPL HS-MCNC: 7 NG/L
UROBILINOGEN UR QL STRIP: NORMAL
WBC NRBC COR # BLD: 13.71 10*3/MM3 (ref 3.4–10.8)

## 2023-05-03 PROCEDURE — 25010000002 ENOXAPARIN PER 10 MG: Performed by: SPECIALIST

## 2023-05-03 PROCEDURE — 83690 ASSAY OF LIPASE: CPT | Performed by: EMERGENCY MEDICINE

## 2023-05-03 PROCEDURE — 84484 ASSAY OF TROPONIN QUANT: CPT | Performed by: EMERGENCY MEDICINE

## 2023-05-03 PROCEDURE — 99285 EMERGENCY DEPT VISIT HI MDM: CPT

## 2023-05-03 PROCEDURE — 25010000002 MORPHINE PER 10 MG: Performed by: SPECIALIST

## 2023-05-03 PROCEDURE — G0378 HOSPITAL OBSERVATION PER HR: HCPCS

## 2023-05-03 PROCEDURE — 25010000002 ONDANSETRON PER 1 MG: Performed by: SPECIALIST

## 2023-05-03 PROCEDURE — 80053 COMPREHEN METABOLIC PANEL: CPT | Performed by: EMERGENCY MEDICINE

## 2023-05-03 PROCEDURE — 25010000002 LORAZEPAM PER 2 MG: Performed by: SPECIALIST

## 2023-05-03 PROCEDURE — 76705 ECHO EXAM OF ABDOMEN: CPT

## 2023-05-03 PROCEDURE — 93005 ELECTROCARDIOGRAM TRACING: CPT | Performed by: EMERGENCY MEDICINE

## 2023-05-03 PROCEDURE — 96375 TX/PRO/DX INJ NEW DRUG ADDON: CPT

## 2023-05-03 PROCEDURE — 96374 THER/PROPH/DIAG INJ IV PUSH: CPT

## 2023-05-03 PROCEDURE — 85025 COMPLETE CBC W/AUTO DIFF WBC: CPT | Performed by: EMERGENCY MEDICINE

## 2023-05-03 PROCEDURE — 25010000002 ONDANSETRON PER 1 MG: Performed by: EMERGENCY MEDICINE

## 2023-05-03 PROCEDURE — 25010000002 HYDROMORPHONE PER 4 MG: Performed by: EMERGENCY MEDICINE

## 2023-05-03 PROCEDURE — 25510000001 IOPAMIDOL 61 % SOLUTION: Performed by: EMERGENCY MEDICINE

## 2023-05-03 PROCEDURE — 25010000002 CEFOXITIN PER 1 G: Performed by: SPECIALIST

## 2023-05-03 PROCEDURE — 74177 CT ABD & PELVIS W/CONTRAST: CPT

## 2023-05-03 PROCEDURE — 96372 THER/PROPH/DIAG INJ SC/IM: CPT

## 2023-05-03 PROCEDURE — 93010 ELECTROCARDIOGRAM REPORT: CPT | Performed by: EMERGENCY MEDICINE

## 2023-05-03 PROCEDURE — 81003 URINALYSIS AUTO W/O SCOPE: CPT | Performed by: EMERGENCY MEDICINE

## 2023-05-03 PROCEDURE — 96376 TX/PRO/DX INJ SAME DRUG ADON: CPT

## 2023-05-03 PROCEDURE — 96361 HYDRATE IV INFUSION ADD-ON: CPT

## 2023-05-03 PROCEDURE — 74018 RADEX ABDOMEN 1 VIEW: CPT

## 2023-05-03 PROCEDURE — 25010000002 HYDROMORPHONE PER 4 MG: Performed by: SPECIALIST

## 2023-05-03 PROCEDURE — 0 HYDROMORPHONE 1 MG/ML SOLUTION: Performed by: EMERGENCY MEDICINE

## 2023-05-03 RX ORDER — DEXTROSE, SODIUM CHLORIDE, AND POTASSIUM CHLORIDE 5; .45; .15 G/100ML; G/100ML; G/100ML
125 INJECTION INTRAVENOUS CONTINUOUS
Status: DISCONTINUED | OUTPATIENT
Start: 2023-05-03 | End: 2023-05-04 | Stop reason: SDUPTHER

## 2023-05-03 RX ORDER — SODIUM CHLORIDE 9 MG/ML
40 INJECTION, SOLUTION INTRAVENOUS AS NEEDED
Status: DISCONTINUED | OUTPATIENT
Start: 2023-05-03 | End: 2023-05-04

## 2023-05-03 RX ORDER — PANTOPRAZOLE SODIUM 40 MG/1
40 TABLET, DELAYED RELEASE ORAL DAILY
COMMUNITY

## 2023-05-03 RX ORDER — ONDANSETRON HCL IN 0.9 % NACL 8 MG/50 ML
8 INTRAVENOUS SOLUTION, PIGGYBACK (ML) INTRAVENOUS EVERY 6 HOURS PRN
Status: DISCONTINUED | OUTPATIENT
Start: 2023-05-03 | End: 2023-05-04 | Stop reason: SDUPTHER

## 2023-05-03 RX ORDER — FAMOTIDINE 10 MG/ML
20 INJECTION, SOLUTION INTRAVENOUS EVERY 12 HOURS SCHEDULED
Status: DISCONTINUED | OUTPATIENT
Start: 2023-05-03 | End: 2023-05-04

## 2023-05-03 RX ORDER — PANTOPRAZOLE SODIUM 40 MG/10ML
80 INJECTION, POWDER, LYOPHILIZED, FOR SOLUTION INTRAVENOUS ONCE
Status: COMPLETED | OUTPATIENT
Start: 2023-05-03 | End: 2023-05-03

## 2023-05-03 RX ORDER — ENOXAPARIN SODIUM 100 MG/ML
30 INJECTION SUBCUTANEOUS ONCE
Status: COMPLETED | OUTPATIENT
Start: 2023-05-03 | End: 2023-05-03

## 2023-05-03 RX ORDER — HYDROMORPHONE HYDROCHLORIDE 1 MG/ML
0.5 INJECTION, SOLUTION INTRAMUSCULAR; INTRAVENOUS; SUBCUTANEOUS ONCE
Status: COMPLETED | OUTPATIENT
Start: 2023-05-03 | End: 2023-05-03

## 2023-05-03 RX ORDER — HYDROMORPHONE HYDROCHLORIDE 1 MG/ML
0.5 INJECTION, SOLUTION INTRAMUSCULAR; INTRAVENOUS; SUBCUTANEOUS
Status: DISCONTINUED | OUTPATIENT
Start: 2023-05-03 | End: 2023-05-04 | Stop reason: SDUPTHER

## 2023-05-03 RX ORDER — SODIUM CHLORIDE 0.9 % (FLUSH) 0.9 %
1-10 SYRINGE (ML) INJECTION AS NEEDED
Status: DISCONTINUED | OUTPATIENT
Start: 2023-05-03 | End: 2023-05-04

## 2023-05-03 RX ORDER — CETIRIZINE HYDROCHLORIDE 10 MG/1
10 TABLET ORAL DAILY
COMMUNITY

## 2023-05-03 RX ORDER — SUCRALFATE 1 G/1
1 TABLET ORAL 4 TIMES DAILY PRN
COMMUNITY

## 2023-05-03 RX ORDER — FAMOTIDINE 20 MG/1
20 TABLET, FILM COATED ORAL EVERY 12 HOURS SCHEDULED
Status: DISCONTINUED | OUTPATIENT
Start: 2023-05-03 | End: 2023-05-04

## 2023-05-03 RX ORDER — SODIUM CHLORIDE 0.9 % (FLUSH) 0.9 %
10 SYRINGE (ML) INJECTION EVERY 12 HOURS SCHEDULED
Status: DISCONTINUED | OUTPATIENT
Start: 2023-05-03 | End: 2023-05-04

## 2023-05-03 RX ORDER — SIMETHICONE 80 MG
80 TABLET,CHEWABLE ORAL 4 TIMES DAILY PRN
Status: DISCONTINUED | OUTPATIENT
Start: 2023-05-03 | End: 2023-05-04 | Stop reason: SDUPTHER

## 2023-05-03 RX ORDER — ONDANSETRON 2 MG/ML
4 INJECTION INTRAMUSCULAR; INTRAVENOUS EVERY 6 HOURS PRN
Status: DISCONTINUED | OUTPATIENT
Start: 2023-05-03 | End: 2023-05-04

## 2023-05-03 RX ORDER — ONDANSETRON 2 MG/ML
4 INJECTION INTRAMUSCULAR; INTRAVENOUS ONCE
Status: COMPLETED | OUTPATIENT
Start: 2023-05-03 | End: 2023-05-03

## 2023-05-03 RX ORDER — SUCRALFATE 1 G/1
1 TABLET ORAL
Status: DISCONTINUED | OUTPATIENT
Start: 2023-05-03 | End: 2023-05-04 | Stop reason: SDUPTHER

## 2023-05-03 RX ORDER — ALUMINA, MAGNESIA, AND SIMETHICONE 2400; 2400; 240 MG/30ML; MG/30ML; MG/30ML
15 SUSPENSION ORAL ONCE
Status: COMPLETED | OUTPATIENT
Start: 2023-05-03 | End: 2023-05-03

## 2023-05-03 RX ORDER — ONDANSETRON 8 MG/1
8 TABLET, ORALLY DISINTEGRATING ORAL EVERY 6 HOURS PRN
Status: DISCONTINUED | OUTPATIENT
Start: 2023-05-03 | End: 2023-05-04

## 2023-05-03 RX ORDER — LORAZEPAM 2 MG/ML
1 INJECTION INTRAMUSCULAR EVERY 4 HOURS PRN
Status: DISCONTINUED | OUTPATIENT
Start: 2023-05-03 | End: 2023-05-04 | Stop reason: SDUPTHER

## 2023-05-03 RX ORDER — LIDOCAINE HYDROCHLORIDE 20 MG/ML
15 SOLUTION OROPHARYNGEAL ONCE
Status: COMPLETED | OUTPATIENT
Start: 2023-05-03 | End: 2023-05-03

## 2023-05-03 RX ADMIN — ONDANSETRON 4 MG: 2 INJECTION INTRAMUSCULAR; INTRAVENOUS at 12:00

## 2023-05-03 RX ADMIN — Medication 10 ML: at 22:11

## 2023-05-03 RX ADMIN — HYDROMORPHONE HYDROCHLORIDE 0.5 MG: 1 INJECTION, SOLUTION INTRAMUSCULAR; INTRAVENOUS; SUBCUTANEOUS at 20:04

## 2023-05-03 RX ADMIN — HYDROMORPHONE HYDROCHLORIDE 1 MG: 1 INJECTION, SOLUTION INTRAMUSCULAR; INTRAVENOUS; SUBCUTANEOUS at 15:00

## 2023-05-03 RX ADMIN — SODIUM CHLORIDE 2 G: 900 INJECTION INTRAVENOUS at 23:08

## 2023-05-03 RX ADMIN — HYDROMORPHONE HYDROCHLORIDE 0.5 MG: 1 INJECTION, SOLUTION INTRAMUSCULAR; INTRAVENOUS; SUBCUTANEOUS at 23:08

## 2023-05-03 RX ADMIN — ALUMINUM HYDROXIDE, MAGNESIUM HYDROXIDE, AND DIMETHICONE 15 ML: 400; 400; 40 SUSPENSION ORAL at 12:52

## 2023-05-03 RX ADMIN — LORAZEPAM 1 MG: 2 INJECTION INTRAMUSCULAR; INTRAVENOUS at 23:48

## 2023-05-03 RX ADMIN — HYDROMORPHONE HYDROCHLORIDE 0.5 MG: 1 INJECTION, SOLUTION INTRAMUSCULAR; INTRAVENOUS; SUBCUTANEOUS at 18:15

## 2023-05-03 RX ADMIN — ENOXAPARIN SODIUM 30 MG: 100 INJECTION SUBCUTANEOUS at 22:11

## 2023-05-03 RX ADMIN — PANTOPRAZOLE SODIUM 80 MG: 40 INJECTION, POWDER, FOR SOLUTION INTRAVENOUS at 15:14

## 2023-05-03 RX ADMIN — HYDROMORPHONE HYDROCHLORIDE 0.5 MG: 1 INJECTION, SOLUTION INTRAMUSCULAR; INTRAVENOUS; SUBCUTANEOUS at 17:00

## 2023-05-03 RX ADMIN — ONDANSETRON 4 MG: 2 INJECTION INTRAMUSCULAR; INTRAVENOUS at 22:08

## 2023-05-03 RX ADMIN — IOPAMIDOL 100 ML: 612 INJECTION, SOLUTION INTRAVENOUS at 15:37

## 2023-05-03 RX ADMIN — FAMOTIDINE 20 MG: 10 INJECTION INTRAVENOUS at 22:08

## 2023-05-03 RX ADMIN — SUCRALFATE 1 G: 1 TABLET ORAL at 23:14

## 2023-05-03 RX ADMIN — LIDOCAINE HYDROCHLORIDE 15 ML: 20 SOLUTION ORAL; TOPICAL at 12:52

## 2023-05-03 RX ADMIN — MORPHINE SULFATE 4 MG: 4 INJECTION, SOLUTION INTRAMUSCULAR; INTRAVENOUS at 22:08

## 2023-05-03 RX ADMIN — SODIUM CHLORIDE, POTASSIUM CHLORIDE, SODIUM LACTATE AND CALCIUM CHLORIDE 1000 ML: 600; 310; 30; 20 INJECTION, SOLUTION INTRAVENOUS at 23:14

## 2023-05-03 RX ADMIN — HYDROMORPHONE HYDROCHLORIDE 0.5 MG: 1 INJECTION, SOLUTION INTRAMUSCULAR; INTRAVENOUS; SUBCUTANEOUS at 12:00

## 2023-05-03 NOTE — ED NOTES
Pt called out stating still in severe pain.  Glenn pascal explained dr baron aware and will come discuss pt pain and results.  Pt restless and upset pain not under control.  Glenn PASCAL explained again physician aware.  Glenn PASCAL reported pt c/o to Alyssa PASCAL supervisor.

## 2023-05-03 NOTE — ED PROVIDER NOTES
"EMERGENCY DEPARTMENT ATTENDING NOTE    Patient Name: Slim Sinclair    Chief Complaint   Patient presents with   • Abdominal Pain       PATIENT PRESENTATION:  Slim Sinclair is a 57 y.o. male with PMH significant for kidney stones, multiple rounds of lithotripsy, prior GI evaluation with endoscopy 2 years ago takes Carafate who presents to the ED 1 day of severe epigastric pain.  Denies any vomiting, diarrhea, urinary symptoms, flank pain, nausea.  Patient denies any significant alcohol use and drinks bourbon occasionally but has not had it in days.  Denies any prior abdominal surgeries.  Has not had a fever.  Took his Carafate without symptom relief.  Denies any constipation.  Has not been passing blood in his stool.  Reports symptoms have been well controlled with Carafate since GI eval 2 years ago with normal endoscopy.  Similar pain to those symptoms in the past.  Does not radiate.      PHYSICAL EXAM:   VS: /88 (BP Location: Right arm, Patient Position: Sitting)   Pulse 108   Temp 98.1 °F (36.7 °C)   Resp 16   Ht 180.3 cm (71\")   Wt 113 kg (249 lb 5.4 oz)   SpO2 94%   BMI 34.78 kg/m²   GENERAL: well-nourished, well-developed, awake, alert, no acute distress, nontoxic appearing, comfortable  EYES: PERRL, sclerae anicteric, extraocular movements grossly intact, symmetric lids  EARS, NOSE, MOUTH, THROAT: atraumatic external nose and ears, moist mucous membranes  NECK: symmetric, trachea midline  RESPIRATORY: unlabored respiratory effort, clear to auscultation bilaterally, good air movement  CARDIOVASCULAR: no murmurs, peripheral pulses 2+ and equal in all extremities  GI: soft, obese, focal epigastric tenderness, negative Del Toro sign  MUSCULOSKELETAL/EXTREMITIES: extremities without obvious deformity  SKIN: warm and dry with no obvious rashes  NEUROLOGIC: moving all 4 extremities symmetrically, CN II-XII grossly intact  PSYCHIATRIC: alert, pleasant and cooperative. Appropriate mood and " affect.      MEDICAL DECISION MAKING:    Slim Sinclair is a 57 y.o. male who presented to the ED acute epigastric abdominal pain for the past day    Procedures    Differential Diagnosis Considered: Cholecystitis, appendicitis, pancreatitis, gastritis, ruptured hollow viscus    Labs Ordered:  Labs Reviewed   COMPREHENSIVE METABOLIC PANEL - Abnormal; Notable for the following components:       Result Value    Glucose 147 (*)     Calcium 11.1 (*)     All other components within normal limits    Narrative:     GFR Normal >60                  Chronic Kidney Disease <60                  Kidney Failure <15                     CBC WITH AUTO DIFFERENTIAL - Abnormal; Notable for the following components:    WBC 13.71 (*)     Neutrophil % 76.2 (*)     Lymphocyte % 9.3 (*)     Immature Grans % 3.0 (*)     Neutrophils, Absolute 10.45 (*)     Monocytes, Absolute 1.24 (*)     Immature Grans, Absolute 0.41 (*)     All other components within normal limits   CBC WITH AUTO DIFFERENTIAL - Abnormal; Notable for the following components:    WBC 17.23 (*)     MCHC 30.9 (*)     Neutrophil % 81.3 (*)     Lymphocyte % 4.7 (*)     Immature Grans % 1.5 (*)     Neutrophils, Absolute 14.00 (*)     Monocytes, Absolute 1.88 (*)     Immature Grans, Absolute 0.26 (*)     All other components within normal limits   COMPREHENSIVE METABOLIC PANEL - Abnormal; Notable for the following components:    Glucose 140 (*)     Creatinine 0.75 (*)     Calcium 10.9 (*)     All other components within normal limits    Narrative:     GFR Normal >60                  Chronic Kidney Disease <60                  Kidney Failure <15                     PROTIME-INR - Abnormal; Notable for the following components:    INR 1.13 (*)     All other components within normal limits   POCT GLUCOSE FINGERSTICK - Abnormal; Notable for the following components:    Glucose 167 (*)     All other components within normal limits   LIPASE - Normal   URINALYSIS W/ CULTURE IF  INDICATED - Normal    Narrative:     In absence of clinical symptoms, the presence of pyuria, bacteria, and/or nitrites on the urinalysis result does not correlate with infection.                  Urine microscopic not indicated.   TROPONIN - Normal    Narrative:     High Sensitive Troponin T Reference Range:                  <10.0 ng/L- Negative Female for AMI                  <15.0 ng/L- Negative Male for AMI                  >=10 - Abnormal Female indicating possible myocardial injury.                  >=15 - Abnormal Male indicating possible myocardial injury.                   Clinicians would have to utilize clinical acumen, EKG, Troponin, and serial changes to determine if it is an Acute Myocardial Infarction or myocardial injury due to an underlying chronic condition.                                        AMYLASE - Normal   CBC AND DIFFERENTIAL    Narrative:     The following orders were created for panel order CBC & Differential.                  Procedure                               Abnormality         Status                                     ---------                               -----------         ------                                     CBC Auto Differential[[id number]]        Abnormal            Final result                                                 Please view results for these tests on the individual orders.        Imaging Ordered:   US Gallbladder   Final Result       1.  Mild gallbladder distention, mild gallbladder wall thickening, and   small echogenic intraluminal foci favored to represent gallstones.   Findings are suspicious for acute cholecystitis in the appropriate   clinical setting. No pericholecystic fluid is present.       2.  Hepatic steatosis.   This report was finalized on 05/03/2023 18:07 by Dr. Lex Lazo MD.      CT Abdomen Pelvis With Contrast   Final Result   1. Gallbladder appears distended with hazy contours, concerning for   acute cholecystitis. No bile duct  dilation. No calcified gallstone by   CT, but note that ultrasound is a more sensitive in assessment for   gallstones.   2. LEFT liver with a 5 mm hypodensity, too small to further characterize   by any modality. This is statistically favored to be benign the patient   has no known history of malignancy.       This report was finalized on 05/03/2023 15:49 by Dr Flora Phan MD.      XR Abdomen KUB   Final Result   No acute abnormality.   This report was finalized on 05/03/2023 13:44 by Dr. Sal Sanz MD.      FL Cholangiogram Operative    (Results Pending)   FL C Arm During Surgery    (Results Pending)       Internal chart review:   Past Medical History:   Diagnosis Date   • C. difficile diarrhea    • Kidney stones    • Staph infection        Past Surgical History:   Procedure Laterality Date   • INCISION AND DRAINAGE OF WOUND     • KIDNEY STONE SURGERY Bilateral        Allergies   Allergen Reactions   • Amoxicillin Nausea And Vomiting   • Morphine Nausea And Vomiting         Current Facility-Administered Medications:   •  bupivacaine-EPINEPHrine (MARCAINE w/EPI) 0.5% -[id number] injection, , , PRN, Kevin Cintron MD, 30 mL at 05/04/23 0927  •  cefOXItin (MEFOXIN) 2 g in sodium chloride 0.9 % 100 mL IVPB, 2 g, Intravenous, Q6H, Kevin Cintron MD, Last Rate: 200 mL/hr at 05/04/23 0524, 2 g at 05/04/23 0909  •  cefOXItin (MEFOXIN) 2 g in sodium chloride 0.9 % 100 mL IVPB, 2 g, Intravenous, Once, Kevin Cintron MD  •  dextrose 5 % and sodium chloride 0.45 % with KCl 20 mEq/L infusion, 125 mL/hr, Intravenous, Continuous, Kevin Cintron MD, Last Rate: 125 mL/hr at 05/04/23 0525, 125 mL/hr at 05/04/23 0525  •  droperidol (INAPSINE) injection 0.625 mg, 0.625 mg, Intravenous, Once PRN **OR** droperidol (INAPSINE) injection 0.625 mg, 0.625 mg, Intramuscular, Once PRN, Evan Mccurdy MD  •  famotidine (PEPCID) injection 20 mg, 20 mg, Intravenous, Q12H, 20 mg at 05/03/23 2208 **OR** famotidine (PEPCID)  tablet 20 mg, 20 mg, Oral, Q12H, Kevin Cintron MD  •  [MAR Hold] HYDROmorphone (DILAUDID) injection 0.5 mg, 0.5 mg, Intravenous, Q2H PRN, Kevin Cintron MD, 0.5 mg at 05/04/23 0740  •  iopamidol (ISOVUE-300) 61 % injection, , , PRN, Kevin Cintron MD, 50 mL at 05/04/23 0927  •  lactated ringers bolus 1,000 mL, 1,000 mL, Intravenous, Once, Kevin Cintron MD, Last Rate: 333.3 mL/hr at 05/04/23 0830, 1,000 mL at 05/04/23 0830  •  lactated ringers infusion, 100 mL/hr, Intravenous, Continuous, Evan Mccurdy MD  •  lidocaine PF 1% (XYLOCAINE) injection 0.5 mL, 0.5 mL, Injection, Once PRN, Evan Mccurdy MD  •  [MAR Hold] LORazepam (ATIVAN) injection 1 mg, 1 mg, Intravenous, Q4H PRN, Kevin Cintron MD, 1 mg at 05/04/23 0707  •  [MAR Hold] morphine injection 4 mg, 4 mg, Intravenous, Q1H PRN, Kevin Cintron MD, 4 mg at 05/03/23 2208  •  [MAR Hold] ondansetron (ZOFRAN) 8 mg/50 mL NS IVPB, 8 mg, Intravenous, Q6H PRN, Kevin Cintron MD, 8 mg at 05/04/23 0523  •  [MAR Hold] ondansetron (ZOFRAN) injection 4 mg, 4 mg, Intravenous, Q6H PRN, Kevin Cintron MD, 4 mg at 05/03/23 2208  •  [MAR Hold] ondansetron ODT (ZOFRAN-ODT) disintegrating tablet 8 mg, 8 mg, Oral, Q6H PRN, Kevin Cintron MD  •  [MAR Hold] simethicone (MYLICON) chewable tablet 80 mg, 80 mg, Oral, 4x Daily PRN, Kevin Cintron MD  •  sodium chloride (NS) irrigation solution, , , PRAYESHA, Kevin Cintron MD, 1,000 mL at 05/04/23 0927  •  [MAR Hold] sodium chloride 0.9 % flush 1-10 mL, 1-10 mL, Intravenous, Saida REA Kevin E, MD  •  [MAR Hold] sodium chloride 0.9 % flush 10 mL, 10 mL, Intravenous, Q12H, Kevin Cintron MD, 10 mL at 05/03/23 2211  •  sodium chloride 0.9 % flush 3 mL, 3 mL, Intravenous, Q12H, Evan Mccurdy MD  •  sodium chloride 0.9 % flush 3-10 mL, 3-10 mL, Intravenous, Calli REA Jeremy, MD  •  [MAR Hold] sodium chloride 0.9 % infusion 40 mL, 40 mL, Intravenous, Saida REA Kevin E, MD  •  sodium chloride 0.9 %  infusion 40 mL, 40 mL, Intravenous, PRN, Evan Mccurdy MD  •  sodium chloride 0.9 % infusion, 100 mL/hr, Intravenous, Continuous, Kevin Cintron MD  •  sodium chloride 0.9 % solution, , , PRN, Kevin Cintron MD, 1,000 mL at 05/04/23 0927  •  sodium chloride 1,000 mL with ceFOXitin 1 g irrigation, , , PRN, Kevin Cintron MD, Given at 05/04/23 0927  •  [MAR Hold] sucralfate (CARAFATE) tablet 1 g, 1 g, Oral, 4x Daily AC & at Bedtime, Kevin Cintron MD, 1 g at 05/03/23 2314    Facility-Administered Medications Ordered in Other Encounters:   •  dexamethasone (DECADRON) injection, , Intravenous, PRN, Falder, Radha, CRNA, 8 mg at 05/04/23 0909  •  fentaNYL citrate (PF) (SUBLIMAZE) injection, , Intravenous, PRN, Falder, Radha, CRNA, 100 mcg at 05/04/23 0901  •  lactated ringers infusion, , Intravenous, Continuous PRN, Falder, Radha, CRNA, New Bag at 05/04/23 0859  •  lidocaine PF 2% (XYLOCAINE) injection, , Infiltration, PRN, Falder, Radha, CRNA, 100 mg at 05/04/23 0903  •  Propofol (DIPRIVAN) injection, , Intravenous, PRN, Falder, Radha, CRNA, 200 mg at 05/04/23 0903  •  rocuronium (ZEMURON) injection, , Intravenous, PRN, Falder, Radha, CRNA, 25 mg at 05/04/23 0925    External documents reviewed: Reviewed prior evaluations for kidney stone back in 2001    My EKG interpretation: EKG, normal sinus rhythm, rate 91, no signs of acute ischemia, significant artifact and wandering baseline.    My lab interpretation: Leukocytosis of 13.7, hypercalcemic, glucose mildly elevated 140s, troponin within normal limits, normal bilirubin and transaminases, urine without hematuria or signs of infection    My imaging interpretation: CT with nodule and concern for acute cholecystitis but unable to visualize a stone or definitively diagnose acute cholecystitis     Discussed with: Patient and wife    Contact with Dr. Stigall regarding the ultrasound results.  He will come to the bedside to evaluate the patient due to concerns for  acute cholecystitis.  CT was equivocal, ultrasound with concern for acute cholecystitis and recommended clinical correlation.  Given leukocytosis irretractable pain and multiple doses of narcotics suspect the patient has acute cholecystitis.    ED Course and Re-evaluation: Patient with negative Del Toro sign on initial abdominal exam.  Given epigastric pain, prior history of gastritis, original concern for pancreatitis or peptic ulcer disease.  Since patient was not pain controlled with Dilaudid, concern for hollow viscus injury and KUB completed without signs of free air.  Patient was pain controlled after multiple doses of narcotics and CT scan was completed without signs of pancreatitis but possible acute cholecystitis.  After CT imaging was completed, ultrasound was ordered to evaluate for acute cholecystitis.  Urine without signs of kidney stone, CT without signs of kidney stone.    ED Diagnosis:  (K76.89) Liver nodule - Plan: Case Request, Case Request    (K81.0) Acute cholecystitis - Plan: Case Request, Case Request     Disposition: Pending ultrasound and likely surgery referral for acute cholecystitis.  Last meal was yesterday at 2000, turned over Dr. Rosado pending surgery evaluation.    On chart review patient admitted for acute cholecystitis.        Signed:  Michael Alejandro MD  Emergency Medicine Physician    Please note that portions of this note were completed with a voice recognition program.      Michael Alejandro MD  05/04/23 9997

## 2023-05-03 NOTE — ED NOTES
Dr. Alejandro updated pt reports severe pain.  Pt standing, rocking back and forth next to stretcher.  States gi cocktail helped briefly and pain became severe again.

## 2023-05-03 NOTE — ED NOTES
Pt in reports severe pain after dilaudid administration.  Pt extremely restless, standing by side of bed.  Dr Javon oconnor.

## 2023-05-04 ENCOUNTER — APPOINTMENT (OUTPATIENT)
Dept: GENERAL RADIOLOGY | Facility: HOSPITAL | Age: 58
End: 2023-05-04
Payer: COMMERCIAL

## 2023-05-04 ENCOUNTER — ANESTHESIA (OUTPATIENT)
Dept: PERIOP | Facility: HOSPITAL | Age: 58
End: 2023-05-04
Payer: COMMERCIAL

## 2023-05-04 LAB
ALBUMIN SERPL-MCNC: 4 G/DL (ref 3.5–5.2)
ALBUMIN/GLOB SERPL: 1.3 G/DL
ALP SERPL-CCNC: 78 U/L (ref 39–117)
ALT SERPL W P-5'-P-CCNC: 19 U/L (ref 1–41)
AMYLASE SERPL-CCNC: 49 U/L (ref 28–100)
ANION GAP SERPL CALCULATED.3IONS-SCNC: 10 MMOL/L (ref 5–15)
AST SERPL-CCNC: 17 U/L (ref 1–40)
BASOPHILS # BLD AUTO: 0.1 10*3/MM3 (ref 0–0.2)
BASOPHILS NFR BLD AUTO: 0.6 % (ref 0–1.5)
BILIRUB SERPL-MCNC: 1.1 MG/DL (ref 0–1.2)
BUN SERPL-MCNC: 11 MG/DL (ref 6–20)
BUN/CREAT SERPL: 14.7 (ref 7–25)
CALCIUM SPEC-SCNC: 10.9 MG/DL (ref 8.6–10.5)
CHLORIDE SERPL-SCNC: 99 MMOL/L (ref 98–107)
CO2 SERPL-SCNC: 27 MMOL/L (ref 22–29)
CREAT SERPL-MCNC: 0.75 MG/DL (ref 0.76–1.27)
DEPRECATED RDW RBC AUTO: 45 FL (ref 37–54)
EGFRCR SERPLBLD CKD-EPI 2021: 105.3 ML/MIN/1.73
EOSINOPHIL # BLD AUTO: 0.18 10*3/MM3 (ref 0–0.4)
EOSINOPHIL NFR BLD AUTO: 1 % (ref 0.3–6.2)
ERYTHROCYTE [DISTWIDTH] IN BLOOD BY AUTOMATED COUNT: 13.1 % (ref 12.3–15.4)
GLOBULIN UR ELPH-MCNC: 3 GM/DL
GLUCOSE BLDC GLUCOMTR-MCNC: 167 MG/DL (ref 70–130)
GLUCOSE SERPL-MCNC: 140 MG/DL (ref 65–99)
HCT VFR BLD AUTO: 47 % (ref 37.5–51)
HGB BLD-MCNC: 14.5 G/DL (ref 13–17.7)
IMM GRANULOCYTES # BLD AUTO: 0.26 10*3/MM3 (ref 0–0.05)
IMM GRANULOCYTES NFR BLD AUTO: 1.5 % (ref 0–0.5)
INR PPP: 1.13 (ref 0.91–1.09)
LYMPHOCYTES # BLD AUTO: 0.81 10*3/MM3 (ref 0.7–3.1)
LYMPHOCYTES NFR BLD AUTO: 4.7 % (ref 19.6–45.3)
MCH RBC QN AUTO: 29 PG (ref 26.6–33)
MCHC RBC AUTO-ENTMCNC: 30.9 G/DL (ref 31.5–35.7)
MCV RBC AUTO: 94 FL (ref 79–97)
MONOCYTES # BLD AUTO: 1.88 10*3/MM3 (ref 0.1–0.9)
MONOCYTES NFR BLD AUTO: 10.9 % (ref 5–12)
NEUTROPHILS NFR BLD AUTO: 14 10*3/MM3 (ref 1.7–7)
NEUTROPHILS NFR BLD AUTO: 81.3 % (ref 42.7–76)
NRBC BLD AUTO-RTO: 0 /100 WBC (ref 0–0.2)
PLATELET # BLD AUTO: 372 10*3/MM3 (ref 140–450)
PMV BLD AUTO: 9.9 FL (ref 6–12)
POTASSIUM SERPL-SCNC: 3.8 MMOL/L (ref 3.5–5.2)
PROT SERPL-MCNC: 7 G/DL (ref 6–8.5)
PROTHROMBIN TIME: 14.7 SECONDS (ref 11.8–14.8)
QT INTERVAL: 340 MS
QTC INTERVAL: 418 MS
RBC # BLD AUTO: 5 10*6/MM3 (ref 4.14–5.8)
SODIUM SERPL-SCNC: 136 MMOL/L (ref 136–145)
WBC NRBC COR # BLD: 17.23 10*3/MM3 (ref 3.4–10.8)

## 2023-05-04 PROCEDURE — 25010000002 DEXAMETHASONE PER 1 MG: Performed by: NURSE ANESTHETIST, CERTIFIED REGISTERED

## 2023-05-04 PROCEDURE — 82948 REAGENT STRIP/BLOOD GLUCOSE: CPT

## 2023-05-04 PROCEDURE — 82150 ASSAY OF AMYLASE: CPT | Performed by: SPECIALIST

## 2023-05-04 PROCEDURE — 25010000002 ONDANSETRON PER 1 MG: Performed by: NURSE ANESTHETIST, CERTIFIED REGISTERED

## 2023-05-04 PROCEDURE — 96361 HYDRATE IV INFUSION ADD-ON: CPT

## 2023-05-04 PROCEDURE — 25010000002 LORAZEPAM PER 2 MG: Performed by: SPECIALIST

## 2023-05-04 PROCEDURE — C1726 CATH, BAL DIL, NON-VASCULAR: HCPCS | Performed by: SPECIALIST

## 2023-05-04 PROCEDURE — 25010000002 HYDROMORPHONE PER 4 MG: Performed by: ANESTHESIOLOGY

## 2023-05-04 PROCEDURE — 96376 TX/PRO/DX INJ SAME DRUG ADON: CPT

## 2023-05-04 PROCEDURE — 25010000002 ONDANSETRON PER 1 MG: Performed by: SPECIALIST

## 2023-05-04 PROCEDURE — 74300 X-RAY BILE DUCTS/PANCREAS: CPT

## 2023-05-04 PROCEDURE — 88304 TISSUE EXAM BY PATHOLOGIST: CPT | Performed by: SPECIALIST

## 2023-05-04 PROCEDURE — G0378 HOSPITAL OBSERVATION PER HR: HCPCS

## 2023-05-04 PROCEDURE — 25010000002 HYDROMORPHONE PER 4 MG: Performed by: SPECIALIST

## 2023-05-04 PROCEDURE — 76000 FLUOROSCOPY <1 HR PHYS/QHP: CPT

## 2023-05-04 PROCEDURE — 25010000002 CEFOXITIN PER 1 G: Performed by: SPECIALIST

## 2023-05-04 PROCEDURE — 25010000002 PROPOFOL 10 MG/ML EMULSION: Performed by: NURSE ANESTHETIST, CERTIFIED REGISTERED

## 2023-05-04 PROCEDURE — 85610 PROTHROMBIN TIME: CPT | Performed by: SPECIALIST

## 2023-05-04 PROCEDURE — 25010000002 FENTANYL CITRATE (PF) 100 MCG/2ML SOLUTION: Performed by: NURSE ANESTHETIST, CERTIFIED REGISTERED

## 2023-05-04 PROCEDURE — 25010000002 KETOROLAC TROMETHAMINE PER 15 MG: Performed by: NURSE ANESTHETIST, CERTIFIED REGISTERED

## 2023-05-04 PROCEDURE — 85025 COMPLETE CBC W/AUTO DIFF WBC: CPT | Performed by: SPECIALIST

## 2023-05-04 PROCEDURE — 0 HYDROMORPHONE 1 MG/ML SOLUTION: Performed by: SPECIALIST

## 2023-05-04 PROCEDURE — 36415 COLL VENOUS BLD VENIPUNCTURE: CPT | Performed by: SPECIALIST

## 2023-05-04 PROCEDURE — 25510000001 IOPAMIDOL 61 % SOLUTION: Performed by: SPECIALIST

## 2023-05-04 PROCEDURE — 25010000002 DROPERIDOL PER 5 MG: Performed by: ANESTHESIOLOGY

## 2023-05-04 PROCEDURE — 25010000002 SUGAMMADEX 200 MG/2ML SOLUTION: Performed by: NURSE ANESTHETIST, CERTIFIED REGISTERED

## 2023-05-04 PROCEDURE — 80053 COMPREHEN METABOLIC PANEL: CPT | Performed by: SPECIALIST

## 2023-05-04 DEVICE — HEMOST ABS SURGICEL SNOW 1X2IN: Type: IMPLANTABLE DEVICE | Site: ABDOMEN | Status: FUNCTIONAL

## 2023-05-04 RX ORDER — DROPERIDOL 2.5 MG/ML
0.62 INJECTION, SOLUTION INTRAMUSCULAR; INTRAVENOUS ONCE AS NEEDED
Status: DISCONTINUED | OUTPATIENT
Start: 2023-05-04 | End: 2023-05-04 | Stop reason: HOSPADM

## 2023-05-04 RX ORDER — SODIUM CHLORIDE 9 MG/ML
100 INJECTION, SOLUTION INTRAVENOUS CONTINUOUS
Status: DISCONTINUED | OUTPATIENT
Start: 2023-05-04 | End: 2023-05-04

## 2023-05-04 RX ORDER — BUPIVACAINE HYDROCHLORIDE AND EPINEPHRINE 5; 5 MG/ML; UG/ML
INJECTION, SOLUTION PERINEURAL AS NEEDED
Status: DISCONTINUED | OUTPATIENT
Start: 2023-05-04 | End: 2023-05-04 | Stop reason: HOSPADM

## 2023-05-04 RX ORDER — LABETALOL HYDROCHLORIDE 5 MG/ML
5 INJECTION, SOLUTION INTRAVENOUS
Status: DISCONTINUED | OUTPATIENT
Start: 2023-05-04 | End: 2023-05-04 | Stop reason: HOSPADM

## 2023-05-04 RX ORDER — DEXAMETHASONE SODIUM PHOSPHATE 4 MG/ML
INJECTION, SOLUTION INTRA-ARTICULAR; INTRALESIONAL; INTRAMUSCULAR; INTRAVENOUS; SOFT TISSUE AS NEEDED
Status: DISCONTINUED | OUTPATIENT
Start: 2023-05-04 | End: 2023-05-04 | Stop reason: SURG

## 2023-05-04 RX ORDER — SODIUM CHLORIDE 0.9 % (FLUSH) 0.9 %
3-10 SYRINGE (ML) INJECTION AS NEEDED
Status: DISCONTINUED | OUTPATIENT
Start: 2023-05-04 | End: 2023-05-04 | Stop reason: HOSPADM

## 2023-05-04 RX ORDER — LORAZEPAM 2 MG/ML
1 INJECTION INTRAMUSCULAR EVERY 4 HOURS PRN
Status: DISCONTINUED | OUTPATIENT
Start: 2023-05-04 | End: 2023-05-07 | Stop reason: HOSPADM

## 2023-05-04 RX ORDER — FLUMAZENIL 0.1 MG/ML
0.2 INJECTION INTRAVENOUS AS NEEDED
Status: DISCONTINUED | OUTPATIENT
Start: 2023-05-04 | End: 2023-05-04 | Stop reason: HOSPADM

## 2023-05-04 RX ORDER — ROCURONIUM BROMIDE 10 MG/ML
INJECTION, SOLUTION INTRAVENOUS AS NEEDED
Status: DISCONTINUED | OUTPATIENT
Start: 2023-05-04 | End: 2023-05-04 | Stop reason: SURG

## 2023-05-04 RX ORDER — MAGNESIUM HYDROXIDE 1200 MG/15ML
LIQUID ORAL AS NEEDED
Status: DISCONTINUED | OUTPATIENT
Start: 2023-05-04 | End: 2023-05-04 | Stop reason: HOSPADM

## 2023-05-04 RX ORDER — FAMOTIDINE 20 MG/1
20 TABLET, FILM COATED ORAL 2 TIMES DAILY
Status: DISCONTINUED | OUTPATIENT
Start: 2023-05-04 | End: 2023-05-07 | Stop reason: HOSPADM

## 2023-05-04 RX ORDER — HYDROMORPHONE HYDROCHLORIDE 1 MG/ML
0.5 INJECTION, SOLUTION INTRAMUSCULAR; INTRAVENOUS; SUBCUTANEOUS
Status: DISCONTINUED | OUTPATIENT
Start: 2023-05-04 | End: 2023-05-04 | Stop reason: HOSPADM

## 2023-05-04 RX ORDER — LIDOCAINE HYDROCHLORIDE 10 MG/ML
0.5 INJECTION, SOLUTION EPIDURAL; INFILTRATION; INTRACAUDAL; PERINEURAL ONCE AS NEEDED
Status: DISCONTINUED | OUTPATIENT
Start: 2023-05-04 | End: 2023-05-04 | Stop reason: HOSPADM

## 2023-05-04 RX ORDER — OXYCODONE AND ACETAMINOPHEN 10; 325 MG/1; MG/1
1 TABLET ORAL ONCE AS NEEDED
Status: DISCONTINUED | OUTPATIENT
Start: 2023-05-04 | End: 2023-05-04 | Stop reason: HOSPADM

## 2023-05-04 RX ORDER — SODIUM CHLORIDE 0.9 % (FLUSH) 0.9 %
3 SYRINGE (ML) INJECTION EVERY 12 HOURS SCHEDULED
Status: DISCONTINUED | OUTPATIENT
Start: 2023-05-04 | End: 2023-05-04 | Stop reason: HOSPADM

## 2023-05-04 RX ORDER — KETOROLAC TROMETHAMINE 30 MG/ML
INJECTION, SOLUTION INTRAMUSCULAR; INTRAVENOUS AS NEEDED
Status: DISCONTINUED | OUTPATIENT
Start: 2023-05-04 | End: 2023-05-04 | Stop reason: SURG

## 2023-05-04 RX ORDER — FENTANYL CITRATE 50 UG/ML
25 INJECTION, SOLUTION INTRAMUSCULAR; INTRAVENOUS
Status: DISCONTINUED | OUTPATIENT
Start: 2023-05-04 | End: 2023-05-04 | Stop reason: HOSPADM

## 2023-05-04 RX ORDER — SODIUM CHLORIDE 9 MG/ML
INJECTION, SOLUTION INTRAVENOUS AS NEEDED
Status: DISCONTINUED | OUTPATIENT
Start: 2023-05-04 | End: 2023-05-04 | Stop reason: HOSPADM

## 2023-05-04 RX ORDER — GLYCOPYRROLATE 0.2 MG/ML
INJECTION INTRAMUSCULAR; INTRAVENOUS AS NEEDED
Status: DISCONTINUED | OUTPATIENT
Start: 2023-05-04 | End: 2023-05-04 | Stop reason: SURG

## 2023-05-04 RX ORDER — ENOXAPARIN SODIUM 100 MG/ML
30 INJECTION SUBCUTANEOUS EVERY 12 HOURS
Status: DISCONTINUED | OUTPATIENT
Start: 2023-05-05 | End: 2023-05-07 | Stop reason: HOSPADM

## 2023-05-04 RX ORDER — DEXTROSE AND SODIUM CHLORIDE 5; .45 G/100ML; G/100ML
125 INJECTION, SOLUTION INTRAVENOUS CONTINUOUS
Status: DISCONTINUED | OUTPATIENT
Start: 2023-05-04 | End: 2023-05-07 | Stop reason: HOSPADM

## 2023-05-04 RX ORDER — LIDOCAINE HYDROCHLORIDE 20 MG/ML
INJECTION, SOLUTION EPIDURAL; INFILTRATION; INTRACAUDAL; PERINEURAL AS NEEDED
Status: DISCONTINUED | OUTPATIENT
Start: 2023-05-04 | End: 2023-05-04 | Stop reason: SURG

## 2023-05-04 RX ORDER — PROPOFOL 10 MG/ML
VIAL (ML) INTRAVENOUS AS NEEDED
Status: DISCONTINUED | OUTPATIENT
Start: 2023-05-04 | End: 2023-05-04 | Stop reason: SURG

## 2023-05-04 RX ORDER — FENTANYL CITRATE 50 UG/ML
INJECTION, SOLUTION INTRAMUSCULAR; INTRAVENOUS AS NEEDED
Status: DISCONTINUED | OUTPATIENT
Start: 2023-05-04 | End: 2023-05-04 | Stop reason: SURG

## 2023-05-04 RX ORDER — SODIUM CHLORIDE 9 MG/ML
40 INJECTION, SOLUTION INTRAVENOUS AS NEEDED
Status: DISCONTINUED | OUTPATIENT
Start: 2023-05-04 | End: 2023-05-04 | Stop reason: HOSPADM

## 2023-05-04 RX ORDER — SODIUM CHLORIDE, SODIUM LACTATE, POTASSIUM CHLORIDE, CALCIUM CHLORIDE 600; 310; 30; 20 MG/100ML; MG/100ML; MG/100ML; MG/100ML
100 INJECTION, SOLUTION INTRAVENOUS CONTINUOUS
Status: DISCONTINUED | OUTPATIENT
Start: 2023-05-04 | End: 2023-05-04

## 2023-05-04 RX ORDER — ONDANSETRON 2 MG/ML
4 INJECTION INTRAMUSCULAR; INTRAVENOUS
Status: DISCONTINUED | OUTPATIENT
Start: 2023-05-04 | End: 2023-05-04 | Stop reason: HOSPADM

## 2023-05-04 RX ORDER — NEOSTIGMINE METHYLSULFATE 5 MG/5 ML
SYRINGE (ML) INTRAVENOUS AS NEEDED
Status: DISCONTINUED | OUTPATIENT
Start: 2023-05-04 | End: 2023-05-04 | Stop reason: SURG

## 2023-05-04 RX ORDER — HYDROCODONE BITARTRATE AND ACETAMINOPHEN 7.5; 325 MG/1; MG/1
1 TABLET ORAL EVERY 4 HOURS PRN
Status: DISCONTINUED | OUTPATIENT
Start: 2023-05-04 | End: 2023-05-07 | Stop reason: HOSPADM

## 2023-05-04 RX ORDER — NALOXONE HCL 0.4 MG/ML
0.04 VIAL (ML) INJECTION AS NEEDED
Status: DISCONTINUED | OUTPATIENT
Start: 2023-05-04 | End: 2023-05-04 | Stop reason: HOSPADM

## 2023-05-04 RX ORDER — SIMETHICONE 80 MG
80 TABLET,CHEWABLE ORAL 4 TIMES DAILY PRN
Status: DISCONTINUED | OUTPATIENT
Start: 2023-05-04 | End: 2023-05-07 | Stop reason: HOSPADM

## 2023-05-04 RX ORDER — SUCRALFATE 1 G/1
1 TABLET ORAL
Status: DISCONTINUED | OUTPATIENT
Start: 2023-05-04 | End: 2023-05-07 | Stop reason: HOSPADM

## 2023-05-04 RX ORDER — IBUPROFEN 600 MG/1
600 TABLET ORAL ONCE AS NEEDED
Status: DISCONTINUED | OUTPATIENT
Start: 2023-05-04 | End: 2023-05-04 | Stop reason: HOSPADM

## 2023-05-04 RX ORDER — CEFOXITIN 2 G/1
2 INJECTION, POWDER, FOR SOLUTION INTRAVENOUS ONCE
Status: DISCONTINUED | OUTPATIENT
Start: 2023-05-04 | End: 2023-05-04

## 2023-05-04 RX ORDER — ONDANSETRON 2 MG/ML
INJECTION INTRAMUSCULAR; INTRAVENOUS AS NEEDED
Status: DISCONTINUED | OUTPATIENT
Start: 2023-05-04 | End: 2023-05-04 | Stop reason: SURG

## 2023-05-04 RX ORDER — SODIUM CHLORIDE, SODIUM LACTATE, POTASSIUM CHLORIDE, CALCIUM CHLORIDE 600; 310; 30; 20 MG/100ML; MG/100ML; MG/100ML; MG/100ML
INJECTION, SOLUTION INTRAVENOUS CONTINUOUS PRN
Status: DISCONTINUED | OUTPATIENT
Start: 2023-05-04 | End: 2023-05-04 | Stop reason: SURG

## 2023-05-04 RX ADMIN — KETOROLAC TROMETHAMINE 30 MG: 30 INJECTION, SOLUTION INTRAMUSCULAR; INTRAVENOUS at 10:47

## 2023-05-04 RX ADMIN — SODIUM CHLORIDE, POTASSIUM CHLORIDE, SODIUM LACTATE AND CALCIUM CHLORIDE: 600; 310; 30; 20 INJECTION, SOLUTION INTRAVENOUS at 08:59

## 2023-05-04 RX ADMIN — HYDROMORPHONE HYDROCHLORIDE 0.5 MG: 1 INJECTION, SOLUTION INTRAMUSCULAR; INTRAVENOUS; SUBCUTANEOUS at 06:52

## 2023-05-04 RX ADMIN — HYDROMORPHONE HYDROCHLORIDE 0.5 MG: 1 INJECTION, SOLUTION INTRAMUSCULAR; INTRAVENOUS; SUBCUTANEOUS at 03:36

## 2023-05-04 RX ADMIN — SODIUM CHLORIDE 2 G: 900 INJECTION INTRAVENOUS at 05:24

## 2023-05-04 RX ADMIN — DEXAMETHASONE SODIUM PHOSPHATE 8 MG: 4 INJECTION INTRA-ARTICULAR; INTRALESIONAL; INTRAMUSCULAR; INTRAVENOUS; SOFT TISSUE at 09:09

## 2023-05-04 RX ADMIN — HYDROMORPHONE HYDROCHLORIDE 0.5 MG: 1 INJECTION, SOLUTION INTRAMUSCULAR; INTRAVENOUS; SUBCUTANEOUS at 23:46

## 2023-05-04 RX ADMIN — SODIUM CHLORIDE, POTASSIUM CHLORIDE, SODIUM LACTATE AND CALCIUM CHLORIDE 1000 ML: 600; 310; 30; 20 INJECTION, SOLUTION INTRAVENOUS at 08:30

## 2023-05-04 RX ADMIN — ROCURONIUM BROMIDE 50 MG: 10 INJECTION, SOLUTION INTRAVENOUS at 09:03

## 2023-05-04 RX ADMIN — FENTANYL CITRATE 100 MCG: 50 INJECTION INTRAMUSCULAR; INTRAVENOUS at 09:01

## 2023-05-04 RX ADMIN — PROPOFOL 200 MG: 10 INJECTION, EMULSION INTRAVENOUS at 09:03

## 2023-05-04 RX ADMIN — LIDOCAINE HYDROCHLORIDE 100 MG: 20 INJECTION, SOLUTION EPIDURAL; INFILTRATION; INTRACAUDAL; PERINEURAL at 09:03

## 2023-05-04 RX ADMIN — DEXTROSE AND SODIUM CHLORIDE 125 ML/HR: 5; 450 INJECTION, SOLUTION INTRAVENOUS at 15:06

## 2023-05-04 RX ADMIN — SUGAMMADEX 200 MG: 100 INJECTION, SOLUTION INTRAVENOUS at 11:04

## 2023-05-04 RX ADMIN — FENTANYL CITRATE 50 MCG: 50 INJECTION INTRAMUSCULAR; INTRAVENOUS at 11:06

## 2023-05-04 RX ADMIN — SODIUM CHLORIDE 2 G: 900 INJECTION INTRAVENOUS at 20:46

## 2023-05-04 RX ADMIN — HYDROMORPHONE HYDROCHLORIDE 0.5 MG: 1 INJECTION, SOLUTION INTRAMUSCULAR; INTRAVENOUS; SUBCUTANEOUS at 07:40

## 2023-05-04 RX ADMIN — FENTANYL CITRATE 50 MCG: 50 INJECTION INTRAMUSCULAR; INTRAVENOUS at 10:43

## 2023-05-04 RX ADMIN — POTASSIUM CHLORIDE, DEXTROSE MONOHYDRATE AND SODIUM CHLORIDE 125 ML/HR: 150; 5; 450 INJECTION, SOLUTION INTRAVENOUS at 01:29

## 2023-05-04 RX ADMIN — ONDANSETRON HYDROCHLORIDE 8 MG: 2 INJECTION INTRAMUSCULAR; INTRAVENOUS at 05:23

## 2023-05-04 RX ADMIN — HYDROCODONE BITARTRATE AND ACETAMINOPHEN 1 TABLET: 7.5; 325 TABLET ORAL at 20:46

## 2023-05-04 RX ADMIN — ONDANSETRON 4 MG: 2 INJECTION INTRAMUSCULAR; INTRAVENOUS at 10:45

## 2023-05-04 RX ADMIN — SODIUM CHLORIDE 2 G: 900 INJECTION INTRAVENOUS at 09:09

## 2023-05-04 RX ADMIN — GLYCOPYRROLATE 0.4 MCG: 0.2 INJECTION INTRAMUSCULAR; INTRAVENOUS at 10:53

## 2023-05-04 RX ADMIN — HYDROMORPHONE HYDROCHLORIDE 0.5 MG: 1 INJECTION, SOLUTION INTRAMUSCULAR; INTRAVENOUS; SUBCUTANEOUS at 05:24

## 2023-05-04 RX ADMIN — SODIUM CHLORIDE 2 G: 900 INJECTION INTRAVENOUS at 15:13

## 2023-05-04 RX ADMIN — HYDROMORPHONE HYDROCHLORIDE 0.5 MG: 1 INJECTION, SOLUTION INTRAMUSCULAR; INTRAVENOUS; SUBCUTANEOUS at 01:29

## 2023-05-04 RX ADMIN — FAMOTIDINE 20 MG: 20 TABLET, FILM COATED ORAL at 20:45

## 2023-05-04 RX ADMIN — SUCRALFATE 1 G: 1 TABLET ORAL at 20:46

## 2023-05-04 RX ADMIN — ROCURONIUM BROMIDE 25 MG: 10 INJECTION, SOLUTION INTRAVENOUS at 09:25

## 2023-05-04 RX ADMIN — LORAZEPAM 1 MG: 2 INJECTION INTRAMUSCULAR; INTRAVENOUS at 07:07

## 2023-05-04 RX ADMIN — Medication 3 MG: at 10:53

## 2023-05-04 RX ADMIN — SODIUM CHLORIDE, POTASSIUM CHLORIDE, SODIUM LACTATE AND CALCIUM CHLORIDE: 600; 310; 30; 20 INJECTION, SOLUTION INTRAVENOUS at 10:47

## 2023-05-04 NOTE — ANESTHESIA PREPROCEDURE EVALUATION
Anesthesia Evaluation     Patient summary reviewed and Nursing notes reviewed   no history of anesthetic complications:  NPO Solid Status: > 8 hours             Airway   Mallampati: III  TM distance: >3 FB  Dental      Pulmonary    (-) not a smoker  Cardiovascular   Exercise tolerance: good (4-7 METS)    (-) pacemaker, past MI, CABG      Neuro/Psych  (-) seizures  GI/Hepatic/Renal/Endo    (+)   renal disease stones,     Musculoskeletal     Abdominal   (+) obese,    Substance History      OB/GYN          Other                        Anesthesia Plan    ASA 2     general     (Pt very sedate from pain medicines prior to holding room arrival. )  intravenous induction     Anesthetic plan, risks, benefits, and alternatives have been provided, discussed and informed consent has been obtained with: patient.        CODE STATUS:    Level Of Support Discussed With: Patient  Code Status (Patient has no pulse and is not breathing): CPR (Attempt to Resuscitate)  Medical Interventions (Patient has pulse or is breathing): Full Support

## 2023-05-04 NOTE — OP NOTE
CHOLECYSTECTOMY LAPAROSCOPIC INTRAOPERATIVE CHOLANGIOGRAM  Procedure Note    Slim Sinclair  5/4/2023    Pre-op Diagnosis:   Liver nodule [K76.89]  Acute cholecystitis [K81.0]    Post-op Diagnosis:     Post-Op Diagnosis Codes:     * Liver nodule [K76.89]     * Acute cholecystitis [K81.0]    Procedure/CPT® Codes:      Procedure(s):  CHOLECYSTECTOMY LAPAROSCOPIC INTRAOPERATIVE CHOLANGIOGRAM with fluoroscopy    Surgeon(s):  Kevin Cintron MD         Anesthesia: General    Staff:   Specimens     ID Source Type Tests Collected By Collected At Frozen?    A Gallbladder Tissue · TISSUE PATHOLOGY EXAM   Kevin Cintron MD 5/4/23 1037 No    Description: GALLBLADDER AND CONTENTS    This specimen was not marked as sent.          Estimated Blood Loss: 50 cc    Specimens:                ID Type Source Tests Collected by Time   A (Not marked as sent) : GALLBLADDER AND CONTENTS Tissue Gallbladder TISSUE PATHOLOGY EXAM Kevin Cintron MD 5/4/2023 1037         Drains:   Closed/Suction Drain 1 RUQ Bulb 15 Fr. (Active)   Site Description Unable to view 05/04/23 1113   Dressing Status Clean;Dry;Intact 05/04/23 1113   Drainage Appearance Serosanguineous 05/04/23 1113   Status To bulb suction 05/04/23 1113       Indications: Mr. Sinclair is a 57 y.o. who is here for evaluation of midepigastric right upper quadrant pain.  He states it intermittently for the past 2 years after eating certain foods he would feel full and bloated, he had an episode of significant pain 2 years prior and had upper and lower endoscopy, also endoscopic ultrasound that a question of a lesion in the stomach but this subsequently resolved.  His CT scan showed no abnormalities his ultrasound was unremarkable of his gallbladder in the past he has been doing well until 3 days ago after eating he was full, bloated, and central abdominal pain.  He changed insurance products on 30 April and new product on 1 May and wanted to wait until this occurred he has had  pain for the past 3 days that is progressive and on his scans today he has gallbladder that is distended slightly thickened wall, no stones and also on his CAT scan there is significant distention and a very mild thickening.  It is presumed that he has acute cholecystitis a calculus and with the above problem he is for admission, IV antibiotics and proceeding with laparoscopic exploration and cholecystectomy and treatment the above problem on Thursday, 4 May.  He is aware of the procedure as well as his wife and with full knowledge of this and apparent understanding he gives his informed consent for surgery.    Findings: Findings of acute cholecystitis cholelithiasis, findings of significantly inflamed gallbladder and cholecystitis, cholangiograms completed and normal blood loss was less than 50 cc.  Due to the acute inflammation and thick-walled gallbladder, a 15 mm round Alexandru-Lemus drain was placed    Complications: No complications    Procedure: Patient was placed in a supine position in the surgical suite and after a timeout had been completed  the area was scrubbed prepped and draped with alcohol and Betadine a Ioban was applied.  Following this a transverse skin incision was completed in the upper abdomen incising through the skin and subcutaneous taste tissue to the fascia.  Once of the fascia 2-0 Vicryl stay sutures were placed superior and inferior to the planned transverse incision.  An incision was completed with a 15 blade incising through the fascia and out muscle-splitting technique was completed using Raj clamps and dissecting in a muscle-splitting technique down to the peritoneum.  The peritoneum was opened and entered a blunt trocar 10 mm port was placed in this area and insufflation with CO2 was completed to maintain the abdominal pressure between 10 and 14 mm of pressure.  This accomplished under direct visualization 3  5 mm trochars were placed one in the right mid abdomen and 2 in the  upper abdomen.  With the 3, 5 mm ports and one 10 the gallbladders and grasped with the right lateral port and elevated toward the right shoulder.  The gallbladder was very full and very tense, the gallbladder was aspirated of 60 cc of fluid to enable the clamping of the gallbladder to be completed significant inflammation around the gallbladder was noted and slow tedious dissection was completed to incise the inflammatory rim around the gallbladder.  The adhesions at the lebron hepatis was taken down sharply and as well as with Bovie electrocautery and dissection was completed at the lebron hepatis.  Increased difficulty in completing the dissection was completed due to the acute inflammation but ultimately slow tedious dissection was able to be completed to visualize the cystic duct and cystic artery.  With dissection completed the critical angle was visualized with the cystic artery and cystic duct with this visualized the cystic artery was then clipped ×3 proximally and incised distally further dissection around the infundibulum was completed and a clip was placed across the infundibulum incision into the infundibulum was completed through which a cholangiogram was brought into the field and cholangiography obtained.  There is free flow into the common hepatic duct common bile duct and free flow into the duodenum.  There is no abnormalities of the distal common bile duct preferential distal flow was noted..  With this completed no other abnormalities noted the Cholangiocath was removed the cystic duct was then transected the infundibulum was controlled using 1-0 PDS loop and the cystic duct was controlled using 2-0 PDS loops and one clip distal to this.  With this completed the gallbladder was slowly and tediously removed from the infrahepatic substance prominent venous and arterial tributaries were noted and a single clip was applied across these.  Ultimately the gallbladder was able to be fully removed.  With  this completed the bed was evaluated there is no bleeding from the bed and full control of the cystic duct and cystic artery the gallbladder was then grasped and placed in the Endo Catch  and removed from the right midepigastric port.  There was some spillage of the stones in this dissection, all the stones were removed, the gallbladder and contents were removed and also a 15 mm round Alexandru-Lemus drain was placed in the infrahepatic substance and affixed to the skin using a 3-0 nylon suture.  Having completed this the excess gas was relieved the right midepigastric port was closed using 4 figure-of-eight sutures of 0 Vicryl the deep dermis was reapproximated using simple inverted interrupted sutures of 3-0 Vicryl and skin was enclosed using running subcuticular suture of 4-0 Vicryl.  The skin was injected with half percent Marcaine with epinephrine a total of 30 mL used the 5 mm trochars sites were then closed using simple inverted interrupted sutures of 4-0 Vicryl and once completed the area was cleaned with saline Mastisol Steri-Strips 2 x 2 and Tegaderm applied.  Patient was then extubated and transferred to the recovery room in good condition and we supplied back pressure over the right midepigastric port until the patient was extubated to reduce any increased tension on the larger trocar site.          Kevin Cintron MD     Date: 5/4/2023  Time: 11:27 CDT    Part of this note may be an electronic transcription/translation of spoken language to printed text using the Dragon Dictation System.

## 2023-05-04 NOTE — PLAN OF CARE
Goal Outcome Evaluation:  Plan of Care Reviewed With: patient, spouse        Progress: improving    VSS.  O2 1.5L.  Dsg CDI to abdomen.  FRANCO X1 with small output.  Voiding.  SCDs.  Currently denies pain.  IVF and abx as ordered.  Very sleepy since return from PACU.  Safety maintained.  Fall precautions.  Tolerating clear liquids.  Wife at bedside.

## 2023-05-04 NOTE — PLAN OF CARE
Goal Outcome Evaluation:  Plan of Care Reviewed With: patient        Progress: no change  Outcome Evaluation: PRN pain meds given as ordered, with minimal results. This am pt noted to be severe pain with no relief noted from pain or nausea meds. MD notified and extra dose of Dilaudid given. Pt in for surgery today

## 2023-05-04 NOTE — PROGRESS NOTES
Patient had a relatively quiet night but this morning had further abdominal pain once again in the midepigastric right upper quadrant area, he was given 0.5 of Dilaudid and another 0.25, subsequently his pain is resolved, he is somewhat sleepy nursing staff called several times saying that he was very vocal and also complaining of this pain by the time of seeing him thankfully the medication had taken effect given by nurse Trang Plunkett.  He is doing much better at present, CT scans were reviewed, once again the only abnormality I can see is a distended gallbladder with a slightly thickened gallbladder wall, minimal inflammatory changes noted around, no other problems other than known kidney stones but this does not present as the usual discomfort that he has had with his kidney stones.  Plan for laparoscopic exploration cholecystectomy and treatment the above problem this morning continue antibiotics plan surgical intervention within the next 2 hours.

## 2023-05-04 NOTE — H&P
Patient: Slim Sinclair    YOB: 1965    Date: 05/03/2023    Primary Care Provider: Provider, No Known    Chief Complaint   Patient presents with   • Abdominal Pain       Subjective .     History of present illness:  Mr. Sinclair is a 57 y.o. who is here for evaluation of midepigastric right upper quadrant pain.  He states it intermittently for the past 2 years after eating certain foods he would feel full and bloated, he had an episode of significant pain 2 years prior and had upper and lower endoscopy, also endoscopic ultrasound that a question of a lesion in the stomach but this subsequently resolved.  His CT scan showed no abnormalities his ultrasound was unremarkable of his gallbladder in the past he has been doing well until 3 days ago after eating he was full, bloated, and central abdominal pain.  He changed insurance products on 30 April and new product on 1 May and wanted to wait until this occurred he has had pain for the past 3 days that is progressive and on his scans today he has gallbladder that is distended slightly thickened wall, no stones and also on his CAT scan there is significant distention and a very mild thickening.  It is presumed that he has acute cholecystitis a calculus and with the above problem he is for admission, IV antibiotics and proceeding with laparoscopic exploration and cholecystectomy and treatment the above problem on Thursday, 4 May.  He is aware of the procedure as well as his wife and with full knowledge of this and apparent understanding he gives his informed consent for surgery.    Past surgical history significant for right wrist surgery, incision and drainage of bilateral inguinal infections, also multiple kidney stones treated with lithotripsy or laser removal.  Also colonoscopy and upper endoscopy    Medical problems significant for history of C. difficile diarrhea, multiple kidney stones, history of staph infections.  Wrist surgery,    He is non-smoker  minimal drinking    Previously he was a [employer] employee, he and his wife for about to enter half-way.            The following portions of the patient's history were reviewed and updated as appropriate: allergies, current medications, past family history, past medical history, past social history, past surgical history and problem list.    Review of Systems    History:  Past Medical History:   Diagnosis Date   • C. difficile diarrhea    • Kidney stones    • Staph infection           Past Surgical History:   Procedure Laterality Date   • INCISION AND DRAINAGE OF WOUND     • KIDNEY STONE SURGERY Bilateral        History reviewed. No pertinent family history.    Social History     Tobacco Use   • Smoking status: Never   Substance Use Topics   • Alcohol use: Never   • Drug use: Never       Allergies:  Allergies   Allergen Reactions   • Amoxicillin Nausea And Vomiting   • Morphine Nausea And Vomiting       Medications:     Current Facility-Administered Medications:   •  cefOXItin (MEFOXIN) 2 g in sodium chloride 0.9 % 100 mL IVPB, 2 g, Intravenous, Q6H, Kevin Cintron MD  •  dextrose 5 % and sodium chloride 0.45 % with KCl 20 mEq/L infusion, 125 mL/hr, Intravenous, Continuous, Kevin Cintron MD  •  Enoxaparin Sodium (LOVENOX) syringe 30 mg, 30 mg, Subcutaneous, Once, Kevin Cintron MD  •  famotidine (PEPCID) injection 20 mg, 20 mg, Intravenous, Q12H **OR** famotidine (PEPCID) tablet 20 mg, 20 mg, Oral, Q12H, Kevin Cintron MD  •  HYDROmorphone (DILAUDID) injection 0.5 mg, 0.5 mg, Intravenous, Q2H PRN, Kevin Cintron MD  •  lactated ringers bolus 1,000 mL, 1,000 mL, Intravenous, Once, Kevin Cintron MD  •  LORazepam (ATIVAN) injection 1 mg, 1 mg, Intravenous, Q4H PRN, Kevin Cintron MD  •  morphine injection 4 mg, 4 mg, Intravenous, Q1H PRN, Kevin Cintron MD  •  ondansetron (ZOFRAN) 8 mg/50 mL NS IVPB, 8 mg, Intravenous, Q6H PRN, Kevin Cintron MD  •  ondansetron (ZOFRAN) injection  4 mg, 4 mg, Intravenous, Q6H PRN, Kevin Cintron MD  •  ondansetron ODT (ZOFRAN-ODT) disintegrating tablet 8 mg, 8 mg, Oral, Q6H PRN, Kevin Cintron MD  •  simethicone (MYLICON) chewable tablet 80 mg, 80 mg, Oral, 4x Daily PRN, Kevin Cintron MD  •  sodium chloride 0.9 % flush 1-10 mL, 1-10 mL, Intravenous, PRN, Kevin Cintron MD  •  sodium chloride 0.9 % flush 10 mL, 10 mL, Intravenous, Q12H, Kevin Cintron MD  •  sodium chloride 0.9 % infusion 40 mL, 40 mL, Intravenous, PRN, Kevin Cintron MD  •  sucralfate (CARAFATE) tablet 1 g, 1 g, Oral, 4x Daily AC & at Bedtime, Kevin Cintron MD    Current Outpatient Medications:   •  cetirizine (zyrTEC) 10 MG tablet, Take 1 tablet by mouth Daily., Disp: , Rfl:   •  pantoprazole (PROTONIX) 40 MG EC tablet, Take 1 tablet by mouth Daily., Disp: , Rfl:   •  sucralfate (CARAFATE) 1 g tablet, Take 1 tablet by mouth 4 (Four) Times a Day As Needed (reflux)., Disp: , Rfl:     Objective     Vital Signs:   Vitals:    05/03/23 1206 05/03/23 1416 05/03/23 1703 05/03/23 1805   BP:   161/94 136/90   BP Location:       Patient Position:       Pulse: 89 82 104 101   Resp:   18 16   Temp:       TempSrc:       SpO2:   98% 97%   Weight:       Height:           Physical Exam:     General Appearance:    Alert, cooperative, in no acute distress   Head:    Normocephalic, without obvious abnormality, atraumatic   Eyes:            Lids and lashes normal, conjunctivae and sclerae normal, no   icterus, no pallor, corneas clear,   Ears:    Ears appear intact with no abnormalities noted   Throat:   No oral lesions, no thrush, oral mucosa moist       Lungs:     Clear to auscultation,respirations regular, even and                  Unlabored    Heart:    Regular rhythm and normal rate, no murmur, no gallop.   Chest Wall:    No abnormalities observed   Abdomen:     Normal bowel sounds, no masses, no organomegaly, soft        non-tender, non-distended, no guarding.  Mildly obese abdomen,  some mild tenderness in the subxiphoid and right upper quadrant, no palpable gallbladder noted.   Extremities:   Moves all extremities well, no edema, no cyanosis, no             redness   Pulses:   Pulses palpable and equal bilaterally   Skin:   No bleeding, bruising or rash   Lymph nodes:   No palpable adenopathy   Neurologic:   Cranial nerves 2 - 12 grossly intact.         Results Review:   I reviewed the patient's new clinical results.    Review of Systems was reviewed and confirmed as accurate as documented by the MA.    BMI is >= 30 and <35. (Class 1 Obesity). The following options were offered after discussion;: weight loss educational material (shared in after visit summary)    Assessment / Plan:    Diagnoses and all orders for this visit:    1. Liver nodule (Primary)  -     Case Request; Standing  -     Case Request    2. Acute cholecystitis  -     Case Request; Standing  -     Case Request    Other orders  -     CBC & Differential; Standing  -     Comprehensive Metabolic Panel; Standing  -     Lipase; Standing  -     Urinalysis With Culture If Indicated - Urine, Clean Catch; Standing  -     High Sensitivity Troponin T; Standing  -     ECG 12 Lead Chest Pain; Standing  -     CT Abdomen Pelvis With Contrast; Standing  -     HYDROmorphone (DILAUDID) injection 0.5 mg  -     ondansetron (ZOFRAN) injection 4 mg  -     CBC & Differential  -     Comprehensive Metabolic Panel  -     Lipase  -     Urinalysis With Culture If Indicated - Urine, Clean Catch  -     High Sensitivity Troponin T  -     ECG 12 Lead Chest Pain  -     CT Abdomen Pelvis With Contrast  -     Cancel: High Sensitivity Troponin T 2Hr; Standing  -     Cancel: High Sensitivity Troponin T 2Hr  -     aluminum-magnesium hydroxide-simethicone (MAALOX MAX) 400-400-40 MG/5ML suspension 15 mL  -     Lidocaine Viscous HCl (XYLOCAINE) 2 % solution 15 mL  -     XR Abdomen KUB; Standing  -     XR Abdomen KUB  -     Discontinue: HYDROmorphone (DILAUDID)  injection 1 mg  -     pantoprazole (PROTONIX) injection 80 mg  -     HYDROmorphone (DILAUDID) injection 1 mg  -     iopamidol (ISOVUE-300) 61 % injection 100 mL  -     Cancel: US Abdomen Complete; Standing  -     Cancel: US Abdomen Complete  -     HYDROmorphone (DILAUDID) injection 0.5 mg  -     US Gallbladder; Standing  -     US Gallbladder  -     HYDROmorphone (DILAUDID) injection 0.5 mg  -     Follow Anesthesia Guidelines / Protocol; Standing  -     Follow Anesthesia Guidelines / Protocol; Standing  -     Verify / Perform Chlorhexidine Skin Prep; Standing  -     Perform Chlorhexidine Skin Prep Night Before and Morning of Procedure; Standing  -     NPO Diet NPO Type: Strict NPO; Standing  -     sodium chloride 0.9 % infusion  -     ondansetron (ZOFRAN) injection 4 mg  -     Initiate Observation Status; Standing  -     lactated ringers bolus 1,000 mL  -     Follow Anesthesia Guidelines / Protocol  -     Perform Chlorhexidine Skin Prep Night Before and Morning of Procedure  -     Initiate Observation Status; Standing  -     Vital Signs; Standing  -     Intake & Output; Standing  -     Weigh Patient; Standing  -     Oral Care; Standing  -     Oxygen Therapy- Nasal Cannula; Titrate for SPO2: 90% - 95%; Standing  -     Insert Peripheral IV; Standing  -     Saline Lock & Maintain IV Access; Standing  -     sodium chloride 0.9 % flush 10 mL  -     sodium chloride 0.9 % flush 1-10 mL  -     sodium chloride 0.9 % infusion 40 mL  -     Code Status and Medical Interventions:; Standing  -     Enoxaparin Sodium (LOVENOX) syringe 30 mg  -     Place Sequential Compression Device; Standing  -     Maintain Sequential Compression Device; Standing  -     Activity - Ad Torie; Standing  -     Incentive Spirometry; Standing  -     NPO Diet NPO Type: Strict NPO; Standing  -     Amylase; Standing  -     CBC Auto Differential; Standing  -     Comprehensive Metabolic Panel; Standing  -     Protime-INR; Standing  -     ECG 12 Lead Pre-Op /  Pre-Procedure; Standing  -     dextrose 5 % and sodium chloride 0.45 % with KCl 20 mEq/L infusion  -     lactated ringers bolus 1,000 mL  -     cefOXItin (MEFOXIN) 2 g in sodium chloride 0.9 % 100 mL IVPB  -     famotidine (PEPCID) injection 20 mg  -     famotidine (PEPCID) tablet 20 mg  -     HYDROmorphone (DILAUDID) injection 0.5 mg  -     LORazepam (ATIVAN) injection 1 mg  -     morphine injection 4 mg  -     Tell the patient they will probably come get them at 8:00 but we can start by 9:00, please give him the antibody also the pain medicine, we will look toward surgery tomorrow morning, please give him 1 L bolus of lactated Ringer's over 3 hours.  N....; Standing  -     ondansetron (ZOFRAN) 8 mg/50 mL NS IVPB  -     ondansetron ODT (ZOFRAN-ODT) disintegrating tablet 8 mg  -     simethicone (MYLICON) chewable tablet 80 mg  -     sucralfate (CARAFATE) tablet 1 g  -     Initiate Observation Status  -     Vital Signs  -     Vital Signs  -     Intake & Output  -     Weigh Patient  -     Oxygen Therapy- Nasal Cannula; Titrate for SPO2: 90% - 95%  -     Insert Peripheral IV  -     Saline Lock & Maintain IV Access  -     Code Status and Medical Interventions:  -     Place Sequential Compression Device  -     Maintain Sequential Compression Device  -     Activity - Ad Torie  -     Incentive Spirometry  -     Incentive Spirometry  -     ECG 12 Lead Pre-Op / Pre-Procedure  -     Tell the patient they will probably come get them at 8:00 but we can start by 9:00, please give him the antibody also the pain medicine, we will look toward surgery tomorrow morning, please give him 1 L bolus of lactated Ringer's over 3 hours.  N....        57-year-old gentleman with increasing the symptomatic midepigastric right upper quadrant for the past 3 days, distention of his gallbladder, slightly thickened gallbladder wall on ultrasound and CAT scan, white count of 13,000 history of intermittent midepigastric discomfort increased belching  and bloating for the past 2 years.  It is presumed biliary dyskinesia, with acute acalculous cholecystitis he is aware the procedure the risk and benefits and gives his informed consent.  Case discussed with the patient, also his physician x-rays reviewed, labs reviewed orders written for laparoscopic exploration cholecystectomy on Thursday, 4 May.    [x] X-Ray  [x] Reviewed Records  [x] Called Physician/Consulted    Electronically signed by Kevin Cintron MD  05/03/23  19:10 CDT

## 2023-05-04 NOTE — ANESTHESIA PROCEDURE NOTES
Airway  Urgency: elective    Date/Time: 5/4/2023 9:05 AM  Airway not difficult    General Information and Staff    Patient location during procedure: OR  CRNA/CAA: Radha Allred CRNA    Indications and Patient Condition  Indications for airway management: airway protection    Preoxygenated: yes  Mask difficulty assessment: 1 - vent by mask    Final Airway Details  Final airway type: endotracheal airway      Successful airway: ETT  Cuffed: yes   Successful intubation technique: direct laryngoscopy  Facilitating devices/methods: intubating stylet and cricoid pressure  Endotracheal tube insertion site: oral  Blade: Thorne  Blade size: 2  ETT size (mm): 8.0  Cormack-Lehane Classification: grade IIa - partial view of glottis  Placement verified by: chest auscultation and capnometry   Cuff volume (mL): 8  Measured from: lips  ETT/EBT  to lips (cm): 24  Number of attempts at approach: 1  Assessment: lips, teeth, and gum same as pre-op and atraumatic intubation

## 2023-05-04 NOTE — ANESTHESIA POSTPROCEDURE EVALUATION
"Patient: Slim Sinclair    Procedure Summary     Date: 05/04/23 Room / Location:  [facility] OR 09 /  [facility]    Anesthesia Start: 0859 Anesthesia Stop: 1130    Procedure: CHOLECYSTECTOMY LAPAROSCOPIC INTRAOPERATIVE CHOLANGIOGRAM (Abdomen) Diagnosis:       Liver nodule      Acute cholecystitis      (Liver nodule [K76.89])      (Acute cholecystitis [K81.0])    Surgeons: Kevin Cintron MD Provider: Radha Allred CRNA    Anesthesia Type: general ASA Status: 2          Anesthesia Type: general    Vitals  Vitals Value Taken Time   /75 05/04/23 1358   Temp 97.8 °F (36.6 °C) 05/04/23 1358   Pulse 101 05/04/23 1358   Resp 15 05/04/23 1358   SpO2 96 % 05/04/23 1358           Post Anesthesia Care and Evaluation    Patient location during evaluation: PACU  Patient participation: complete - patient participated  Level of consciousness: awake and alert  Pain management: adequate    Airway patency: patent  Anesthetic complications: No anesthetic complications  PONV Status: none  Cardiovascular status: acceptable and hemodynamically stable  Respiratory status: acceptable  Hydration status: acceptable    Comments: Blood pressure 120/85, pulse 103, temperature 97.8 °F (36.6 °C), resp. rate 16, height 180.3 cm (71\"), weight 113 kg (249 lb 5.4 oz), SpO2 91 %.    Patient discharged from PACU based upon Bala score. Please see RN notes for further details      "

## 2023-05-05 PROBLEM — K76.89 LIVER NODULE: Status: RESOLVED | Noted: 2023-05-03 | Resolved: 2023-05-05

## 2023-05-05 LAB
ALBUMIN SERPL-MCNC: 3.3 G/DL (ref 3.5–5.2)
ALBUMIN SERPL-MCNC: 3.4 G/DL (ref 3.5–5.2)
ALBUMIN/GLOB SERPL: 1.3 G/DL
ALBUMIN/GLOB SERPL: 1.4 G/DL
ALP SERPL-CCNC: 112 U/L (ref 39–117)
ALP SERPL-CCNC: 115 U/L (ref 39–117)
ALT SERPL W P-5'-P-CCNC: 401 U/L (ref 1–41)
ALT SERPL W P-5'-P-CCNC: 431 U/L (ref 1–41)
AMYLASE SERPL-CCNC: 55 U/L (ref 28–100)
ANION GAP SERPL CALCULATED.3IONS-SCNC: 10 MMOL/L (ref 5–15)
ANION GAP SERPL CALCULATED.3IONS-SCNC: 7 MMOL/L (ref 5–15)
AST SERPL-CCNC: 263 U/L (ref 1–40)
AST SERPL-CCNC: 291 U/L (ref 1–40)
BILIRUB SERPL-MCNC: 4 MG/DL (ref 0–1.2)
BILIRUB SERPL-MCNC: 5.1 MG/DL (ref 0–1.2)
BUN SERPL-MCNC: 9 MG/DL (ref 6–20)
BUN SERPL-MCNC: 9 MG/DL (ref 6–20)
BUN/CREAT SERPL: 13.6 (ref 7–25)
BUN/CREAT SERPL: 13.6 (ref 7–25)
CALCIUM SPEC-SCNC: 10.2 MG/DL (ref 8.6–10.5)
CALCIUM SPEC-SCNC: 10.5 MG/DL (ref 8.6–10.5)
CHLORIDE SERPL-SCNC: 101 MMOL/L (ref 98–107)
CHLORIDE SERPL-SCNC: 104 MMOL/L (ref 98–107)
CO2 SERPL-SCNC: 26 MMOL/L (ref 22–29)
CO2 SERPL-SCNC: 27 MMOL/L (ref 22–29)
CREAT SERPL-MCNC: 0.66 MG/DL (ref 0.76–1.27)
CREAT SERPL-MCNC: 0.66 MG/DL (ref 0.76–1.27)
DEPRECATED RDW RBC AUTO: 45.3 FL (ref 37–54)
EGFRCR SERPLBLD CKD-EPI 2021: 109.4 ML/MIN/1.73
EGFRCR SERPLBLD CKD-EPI 2021: 109.4 ML/MIN/1.73
ERYTHROCYTE [DISTWIDTH] IN BLOOD BY AUTOMATED COUNT: 13.2 % (ref 12.3–15.4)
ERYTHROCYTE [SEDIMENTATION RATE] IN BLOOD: 38 MM/HR (ref 0–20)
GLOBULIN UR ELPH-MCNC: 2.5 GM/DL
GLOBULIN UR ELPH-MCNC: 2.6 GM/DL
GLUCOSE SERPL-MCNC: 127 MG/DL (ref 65–99)
GLUCOSE SERPL-MCNC: 138 MG/DL (ref 65–99)
HCT VFR BLD AUTO: 39.6 % (ref 37.5–51)
HGB BLD-MCNC: 12.3 G/DL (ref 13–17.7)
MCH RBC QN AUTO: 29.1 PG (ref 26.6–33)
MCHC RBC AUTO-ENTMCNC: 31.1 G/DL (ref 31.5–35.7)
MCV RBC AUTO: 93.8 FL (ref 79–97)
PLATELET # BLD AUTO: 326 10*3/MM3 (ref 140–450)
PMV BLD AUTO: 9.9 FL (ref 6–12)
POTASSIUM SERPL-SCNC: 4.1 MMOL/L (ref 3.5–5.2)
POTASSIUM SERPL-SCNC: 4.1 MMOL/L (ref 3.5–5.2)
PROT SERPL-MCNC: 5.9 G/DL (ref 6–8.5)
PROT SERPL-MCNC: 5.9 G/DL (ref 6–8.5)
RBC # BLD AUTO: 4.22 10*6/MM3 (ref 4.14–5.8)
SODIUM SERPL-SCNC: 137 MMOL/L (ref 136–145)
SODIUM SERPL-SCNC: 138 MMOL/L (ref 136–145)
WBC NRBC COR # BLD: 20.78 10*3/MM3 (ref 3.4–10.8)

## 2023-05-05 PROCEDURE — 25010000002 ONDANSETRON PER 1 MG: Performed by: SPECIALIST

## 2023-05-05 PROCEDURE — 0 HYDROMORPHONE 1 MG/ML SOLUTION: Performed by: SPECIALIST

## 2023-05-05 PROCEDURE — 85027 COMPLETE CBC AUTOMATED: CPT | Performed by: SPECIALIST

## 2023-05-05 PROCEDURE — 80053 COMPREHEN METABOLIC PANEL: CPT | Performed by: SPECIALIST

## 2023-05-05 PROCEDURE — G0378 HOSPITAL OBSERVATION PER HR: HCPCS

## 2023-05-05 PROCEDURE — 82150 ASSAY OF AMYLASE: CPT | Performed by: SPECIALIST

## 2023-05-05 PROCEDURE — 25010000002 CEFOXITIN PER 1 G: Performed by: SPECIALIST

## 2023-05-05 PROCEDURE — 85652 RBC SED RATE AUTOMATED: CPT | Performed by: SPECIALIST

## 2023-05-05 PROCEDURE — 25010000002 ENOXAPARIN PER 10 MG: Performed by: SPECIALIST

## 2023-05-05 RX ORDER — KETOROLAC TROMETHAMINE 10 MG/1
10 TABLET, FILM COATED ORAL EVERY 6 HOURS PRN
Qty: 15 TABLET | Refills: 1 | Status: SHIPPED | OUTPATIENT
Start: 2023-05-05

## 2023-05-05 RX ORDER — ONDANSETRON HYDROCHLORIDE 8 MG/1
4 TABLET, FILM COATED ORAL EVERY 8 HOURS PRN
Qty: 5 TABLET | Refills: 1 | Status: SHIPPED | OUTPATIENT
Start: 2023-05-05

## 2023-05-05 RX ORDER — HYDROCODONE BITARTRATE AND ACETAMINOPHEN 7.5; 325 MG/1; MG/1
1 TABLET ORAL EVERY 4 HOURS PRN
Qty: 20 TABLET | Refills: 0 | Status: SHIPPED | OUTPATIENT
Start: 2023-05-05

## 2023-05-05 RX ORDER — LEVOFLOXACIN 500 MG/1
500 TABLET, FILM COATED ORAL DAILY
Qty: 5 TABLET | Refills: 0 | Status: SHIPPED | OUTPATIENT
Start: 2023-05-05 | End: 2023-05-10

## 2023-05-05 RX ORDER — ONDANSETRON HCL IN 0.9 % NACL 8 MG/50 ML
8 INTRAVENOUS SOLUTION, PIGGYBACK (ML) INTRAVENOUS EVERY 6 HOURS PRN
Status: DISCONTINUED | OUTPATIENT
Start: 2023-05-05 | End: 2023-05-07 | Stop reason: HOSPADM

## 2023-05-05 RX ADMIN — DEXTROSE AND SODIUM CHLORIDE 125 ML/HR: 5; 450 INJECTION, SOLUTION INTRAVENOUS at 22:55

## 2023-05-05 RX ADMIN — FAMOTIDINE 20 MG: 20 TABLET, FILM COATED ORAL at 20:34

## 2023-05-05 RX ADMIN — DEXTROSE AND SODIUM CHLORIDE 125 ML/HR: 5; 450 INJECTION, SOLUTION INTRAVENOUS at 12:11

## 2023-05-05 RX ADMIN — HYDROCODONE BITARTRATE AND ACETAMINOPHEN 1 TABLET: 7.5; 325 TABLET ORAL at 13:52

## 2023-05-05 RX ADMIN — HYDROMORPHONE HYDROCHLORIDE 0.5 MG: 1 INJECTION, SOLUTION INTRAMUSCULAR; INTRAVENOUS; SUBCUTANEOUS at 07:07

## 2023-05-05 RX ADMIN — HYDROMORPHONE HYDROCHLORIDE 0.5 MG: 1 INJECTION, SOLUTION INTRAMUSCULAR; INTRAVENOUS; SUBCUTANEOUS at 10:21

## 2023-05-05 RX ADMIN — DEXTROSE AND SODIUM CHLORIDE 125 ML/HR: 5; 450 INJECTION, SOLUTION INTRAVENOUS at 01:10

## 2023-05-05 RX ADMIN — HYDROMORPHONE HYDROCHLORIDE 0.5 MG: 1 INJECTION, SOLUTION INTRAMUSCULAR; INTRAVENOUS; SUBCUTANEOUS at 16:55

## 2023-05-05 RX ADMIN — HYDROMORPHONE HYDROCHLORIDE 0.5 MG: 1 INJECTION, SOLUTION INTRAMUSCULAR; INTRAVENOUS; SUBCUTANEOUS at 22:55

## 2023-05-05 RX ADMIN — HYDROCODONE BITARTRATE AND ACETAMINOPHEN 1 TABLET: 7.5; 325 TABLET ORAL at 08:49

## 2023-05-05 RX ADMIN — SUCRALFATE 1 G: 1 TABLET ORAL at 20:34

## 2023-05-05 RX ADMIN — SUCRALFATE 1 G: 1 TABLET ORAL at 16:55

## 2023-05-05 RX ADMIN — SUCRALFATE 1 G: 1 TABLET ORAL at 12:10

## 2023-05-05 RX ADMIN — ENOXAPARIN SODIUM 30 MG: 100 INJECTION SUBCUTANEOUS at 12:10

## 2023-05-05 RX ADMIN — ONDANSETRON 8 MG: 2 INJECTION INTRAMUSCULAR; INTRAVENOUS at 10:22

## 2023-05-05 RX ADMIN — FAMOTIDINE 20 MG: 20 TABLET, FILM COATED ORAL at 08:38

## 2023-05-05 RX ADMIN — SODIUM CHLORIDE 2 G: 900 INJECTION INTRAVENOUS at 03:10

## 2023-05-05 RX ADMIN — SODIUM CHLORIDE 2 G: 900 INJECTION INTRAVENOUS at 20:34

## 2023-05-05 RX ADMIN — HYDROCODONE BITARTRATE AND ACETAMINOPHEN 1 TABLET: 7.5; 325 TABLET ORAL at 19:48

## 2023-05-05 RX ADMIN — SODIUM CHLORIDE 2 G: 900 INJECTION INTRAVENOUS at 08:49

## 2023-05-05 RX ADMIN — SODIUM CHLORIDE 2 G: 900 INJECTION INTRAVENOUS at 15:08

## 2023-05-05 RX ADMIN — SUCRALFATE 1 G: 1 TABLET ORAL at 08:38

## 2023-05-05 RX ADMIN — HYDROCODONE BITARTRATE AND ACETAMINOPHEN 1 TABLET: 7.5; 325 TABLET ORAL at 04:08

## 2023-05-05 NOTE — PLAN OF CARE
Goal Outcome Evaluation:  Plan of Care Reviewed With: patient, spouse        Progress: improving    VSS.  Up ad darlin and ambulating in hallway.  Medicated frequently for pain and nausea.  Voiding.  Dsg CDI to abdomen.  Minimal output from FRANCO.  IVF and abx as ordered.  RA.  SCDs.  Likely discharge to home tomorrow pending lab results.  Prescriptions already prescribed by Dr Cintron to [facility] outpatient pharmacy - wife has picked up these from pharmacy due to pharmacy being closed over the weekend.  Safety maintained.  Cooperative with care.

## 2023-05-05 NOTE — PROGRESS NOTES
Patient's liver functions are coming down this morning's bilirubin was 5.1 currently 4 hours later 4.0, quite possibly he passed a small stone the other transaminases are also downward going alkaline phos is normal at 115.  Will monitor overnight, if labs continue to improve probably home on Saturday follow-up with me on Wednesday.  With labs       Latest Reference Range & Units 05/05/23 04:59 05/05/23 10:40   Glucose 65 - 99 mg/dL 138 (H) 127 (H)   Sodium 136 - 145 mmol/L 138 137   Potassium 3.5 - 5.2 mmol/L 4.1 4.1   CO2 22.0 - 29.0 mmol/L 27.0 26.0   Chloride 98 - 107 mmol/L 104 101   Anion Gap 5.0 - 15.0 mmol/L 7.0 10.0   Creatinine 0.76 - 1.27 mg/dL 0.66 (L) 0.66 (L)   BUN 6 - 20 mg/dL 9 9   BUN/Creatinine Ratio 7.0 - 25.0  13.6 13.6   Calcium 8.6 - 10.5 mg/dL 10.2 10.5   eGFR >60.0 mL/min/1.73 109.4 109.4   Alkaline Phosphatase 39 - 117 U/L 112 115   Total Protein 6.0 - 8.5 g/dL 5.9 (L) 5.9 (L)   ALT (SGPT) 1 - 41 U/L 401 (H) 431 (H)   AST (SGOT) 1 - 40 U/L 291 (H) 263 (H)   Total Bilirubin 0.0 - 1.2 mg/dL 5.1 (H) 4.0 (H)   Albumin 3.5 - 5.2 g/dL 3.4 (L) 3.3 (L)   Globulin gm/dL 2.5 2.6   A/G Ratio g/dL 1.4 1.3   (H): Data is abnormally high  (L): Data is abnormally low

## 2023-05-05 NOTE — PROGRESS NOTES
LOS: 0 days   Patient Care Team:  Provider, No Known as PCP - General    Chief Complaint: Postoperative day 1 status post cholecystectomy    Subjective     Subjective     Postoperative day 1 status post treatment of acute cholecystitis, had significant amount of stones noted throughout all small, very significant inflammation noted, some of the bare area was removed and removal of this acutely inflamed gallbladder.  Drainage output is minimal but does have a slight amount of bile present, the amazing finding is liver functions are significantly elevated despite a normal cholangiogram.?  Objective      Objective     Vital Signs  Temp:  [97.5 °F (36.4 °C)-98.6 °F (37 °C)] 98.6 °F (37 °C)  Heart Rate:  [] 89  Resp:  [15-18] 16  BP: ()/(51-88) 110/64    Intake & Output (last 3 days)       05/02 0701  05/03 0700 05/03 0701  05/04 0700 05/04 0701  05/05 0700 05/05 0701  05/06 0700    I.V. (mL/kg)   3925 (34.7)     IV Piggyback   100     Total Intake(mL/kg)   4025 (35.6)     Urine (mL/kg/hr)   2850 (1.1)     Drains   20     Total Output   2870     Net   +1155             Urine Unmeasured Occurrence  1 x 1 x           Physical Exam:     General Appearance:    Alert, cooperative, in no acute distress   Lungs:     Clear to auscultation, respirations regular, even and                  unlabored    Heart:    Regular rhythm and normal rate, normal S1 and S2, no            murmur, no gallop, no rub, no click   Chest Wall:    No abnormalities observed   Abdomen:    Occasional bowel sounds, wounds are clean and dry, there is a slight amount of bile present in the drain, minimal output from the drain but as reported there was some significant inflammation and some of the bare area was uncovered with this dissection of significantly inflamed gallbladder.  I am quite amazed that the liver functions are elevated alkaline phos is normal but total bilirubin 5.1G OT is 291.  His cholangiogram was normal and the cystic  duct was controlled using 3 loops and 1 clip.        Results Review:     I reviewed the patient's new clinical results.  I reviewed the patient's new imaging results and agree with the interpretation.    Results from last 7 days   Lab Units 05/05/23 0459 05/04/23 0418 05/03/23  1150   WBC 10*3/mm3 20.78* 17.23* 13.71*   HEMOGLOBIN g/dL 12.3* 14.5 15.8   HEMATOCRIT % 39.6 47.0 49.5   PLATELETS 10*3/mm3 326 372 387        Results from last 7 days   Lab Units 05/05/23 0459 05/04/23 0418 05/03/23  1150   SODIUM mmol/L 138 136 141   POTASSIUM mmol/L 4.1 3.8 4.5   CHLORIDE mmol/L 104 99 104   CO2 mmol/L 27.0 27.0 29.0   BUN mg/dL 9 11 14   CREATININE mg/dL 0.66* 0.75* 0.82   CALCIUM mg/dL 10.2 10.9* 11.1*   BILIRUBIN mg/dL 5.1* 1.1 0.7   ALK PHOS U/L 112 78 89   ALT (SGPT) U/L 401* 19 19   AST (SGOT) U/L 291* 17 20   GLUCOSE mg/dL 138* 140* 147*       Assessment/Plan     Assessment & Plan       Liver nodule    Acute cholecystitis    Currently 1 day status post laparoscopic cholecystectomy for acute cholecystitis cholelithiasis, cholangiogram was normal.  Liver functions are elevated today his abdominal pain is much improved, white count has not changed that much, we will check liver functions at noon and see how these are doing, but overall he appears much better if liver functions continue to be elevated we will look toward arranging a ERCP on Monday patient is advised of this we will keep the drain in if liver functions are coming down I have no problems with him going home on Saturday on p.o. antibiotics orders will be written.    [] X-Ray  [] Reviewed Records  [] Called Physician/Consulted    Kevin Cintron MD  05/05/23  08:29 CDT      Time: time spent with patient 15 minutes     Part of this note may be an electronic transcription/translation of spoken language to printed text using the Dragon Dictation System.

## 2023-05-05 NOTE — PLAN OF CARE
Goal Outcome Evaluation:  Plan of Care Reviewed With: (P) patient        Progress: (P) improving     RA, sats in mid 90s. Voiding per urinal. IVF and abx infusing per orders. SCDs. Tolerating clears, regular diet for breakfast. VSS, safety maintained.

## 2023-05-05 NOTE — DISCHARGE SUMMARY
Date of Discharge:  5/5/2023    Discharge Diagnosis: Acute cholecystitis cholelithiasis    Presenting Problem/History of Present Illness    Mr. Sinclair is a 57 y.o. who is here for evaluation of midepigastric right upper quadrant pain.  He states it intermittently for the past 2 years after eating certain foods he would feel full and bloated, he had an episode of significant pain 2 years prior and had upper and lower endoscopy, also endoscopic ultrasound that a question of a lesion in the stomach but this subsequently resolved.  His CT scan showed no abnormalities his ultrasound was unremarkable of his gallbladder in the past he has been doing well until 3 days ago after eating he was full, bloated, and central abdominal pain.  He changed insurance products on 30 April and new product on 1 May and wanted to wait until this occurred he has had pain for the past 3 days that is progressive and on his scans today he has gallbladder that is distended slightly thickened wall, no stones and also on his CAT scan there is significant distention and a very mild thickening.  It is presumed that he has acute cholecystitis a calculus and with the above problem he is for admission, IV antibiotics and proceeding with laparoscopic exploration and cholecystectomy and treatment the above problem on Thursday, 4 May.  He is aware of the procedure as well as his wife and with full knowledge of this and apparent understanding he gives his informed consent for surgery.     Findings: Findings of acute cholecystitis cholelithiasis, findings of significantly inflamed gallbladder and cholecystitis, cholangiograms completed and normal blood loss was less than 50 cc.  Due to the acute inflammation and thick-walled gallbladder, a 15 mm round Alexandru-Lemus drain was placed       He was subsequently admitted had findings of acute cholecystitis cholelithiasis, very inflamed gallbladder was noted some of the back wall of the gallbladder and liver  were removed, he had a slight leak of bile the first day, that is gradually reducing, his liver functions were extremely elevated the day after surgery with a total bilirubin up to 5, quick check 4 hours later showed it was reduced to 4 he in May effect had passed a small stone that did pass he had extreme pain preoperatively that was really out of character for acute cholecystitis overall.  Possible common duct stone that was passed?  He is doing well from his surgery we will discharge him follow-up with me in 4 days for wound check and check his labs and drain if the drainage output is reduced we will look toward removal of the drain on Wednesday.    Procedures Performed  Procedure(s):  CHOLECYSTECTOMY LAPAROSCOPIC INTRAOPERATIVE CHOLANGIOGRAM       Consults:   Consults     No orders found from 4/4/2023 to 5/4/2023.          Pertinent Test Results:   Lab Results (last 24 hours)     Procedure Component Value Units Date/Time    Comprehensive Metabolic Panel [[id number]]  (Abnormal) Collected: 05/05/23 1040    Specimen: Blood Updated: 05/05/23 1105     Glucose 127 mg/dL      BUN 9 mg/dL      Creatinine 0.66 mg/dL      Sodium 137 mmol/L      Potassium 4.1 mmol/L      Chloride 101 mmol/L      CO2 26.0 mmol/L      Calcium 10.5 mg/dL      Total Protein 5.9 g/dL      Albumin 3.3 g/dL      ALT (SGPT) 431 U/L      AST (SGOT) 263 U/L      Alkaline Phosphatase 115 U/L      Total Bilirubin 4.0 mg/dL      Globulin 2.6 gm/dL      A/G Ratio 1.3 g/dL      BUN/Creatinine Ratio 13.6     Anion Gap 10.0 mmol/L      eGFR 109.4 mL/min/1.73     Narrative:      GFR Normal >60  Chronic Kidney Disease <60  Kidney Failure <15      Amylase [[id number]]  (Normal) Collected: 05/05/23 1040    Specimen: Blood Updated: 05/05/23 1103     Amylase 55 U/L     Sedimentation Rate [[id number]]  (Abnormal) Collected: 05/05/23 0459    Specimen: Blood Updated: 05/05/23 0642     Sed Rate 38 mm/hr     Comprehensive Metabolic Panel [[id number]]  (Abnormal)  Collected: 05/05/23 0459    Specimen: Blood Updated: 05/05/23 0539     Glucose 138 mg/dL      BUN 9 mg/dL      Creatinine 0.66 mg/dL      Sodium 138 mmol/L      Potassium 4.1 mmol/L      Chloride 104 mmol/L      CO2 27.0 mmol/L      Calcium 10.2 mg/dL      Total Protein 5.9 g/dL      Albumin 3.4 g/dL      ALT (SGPT) 401 U/L      AST (SGOT) 291 U/L      Alkaline Phosphatase 112 U/L      Total Bilirubin 5.1 mg/dL      Globulin 2.5 gm/dL      A/G Ratio 1.4 g/dL      BUN/Creatinine Ratio 13.6     Anion Gap 7.0 mmol/L      eGFR 109.4 mL/min/1.73     Narrative:      GFR Normal >60  Chronic Kidney Disease <60  Kidney Failure <15      CBC (No Diff) [[id number]]  (Abnormal) Collected: 05/05/23 0459    Specimen: Blood Updated: 05/05/23 0531     WBC 20.78 10*3/mm3      RBC 4.22 10*6/mm3      Hemoglobin 12.3 g/dL      Hematocrit 39.6 %      MCV 93.8 fL      MCH 29.1 pg      MCHC 31.1 g/dL      RDW 13.2 %      RDW-SD 45.3 fl      MPV 9.9 fL      Platelets 326 10*3/mm3             Condition on Discharge: Good condition      Discharge Disposition discharge to home with his drain to empty and recharge the drain every 12 hours and record the output  Home or Self Care    Discharge Medications     Discharge Medications      New Medications      Instructions Start Date   HYDROcodone-acetaminophen 7.5-325 MG per tablet  Commonly known as: NORCO   Take 1 tablet by mouth Every 4 (Four) Hours As Needed for Moderate Pain      ketorolac 10 MG tablet  Commonly known as: TORADOL   10 mg, Oral, Every 6 Hours PRN      levoFLOXacin 500 MG tablet  Commonly known as: Levaquin   500 mg, Oral, Daily      methylcellulose oral powder  Commonly known as: Citrucel   2 application, Oral, Daily      ondansetron 8 MG tablet  Commonly known as: Zofran   4 mg, Oral, Every 8 Hours PRN         Continue These Medications      Instructions Start Date   cetirizine 10 MG tablet  Commonly known as: zyrTEC   10 mg, Oral, Daily      pantoprazole 40 MG EC  tablet  Commonly known as: PROTONIX   40 mg, Oral, Daily      sucralfate 1 g tablet  Commonly known as: CARAFATE   1 g, Oral, 4 Times Daily PRN             Discharge Diet: Regular diet    Activity at Discharge:   Activity Instructions     Activity as Tolerated      Discharge Activity      1) No driving for the next 48 hours, sponge bathe only as long as the drain is in place, follow-up with Dr. Cintron this coming Wednesday, labs to be completed prior to follow-up, no lifting straining pulling tugging for the next month.  Do not expect a bowel movement until Sunday, if no bowel movement by Monday please take 2 Dulcolax by mouth.  Labs to be completed prior to follow-up.          Follow-up Appointments  Future Appointments   Date Time Provider Department Center   5/10/2023 11:30 AM Kevin Cintron MD [facility]   5/12/2023  2:00 PM Susan Morgan APRN [facility]     Additional Instructions for the Follow-ups that You Need to Schedule     Discharge Follow-up with Specified Provider: Follow-up with Dr. Cintron this coming Wednesday, labs to be completed prior to follow-up.   As directed      To: Follow-up with Dr. Cintron this coming Wednesday, labs to be completed prior to follow-up.         Amylase    May 10, 2023 (Approximate)      C-reactive Protein    May 10, 2023 (Approximate)      CBC Auto Differential    May 10, 2023 (Approximate)      Comprehensive Metabolic Panel    May 10, 2023 (Approximate)      Release to patient: Immediate         Lipase    May 10, 2023 (Approximate)      Sedimentation Rate    May 10, 2023 (Approximate)      Release to patient: Immediate               Test Results Pending at Discharge  Pending Labs     Order Current Status    Tissue Pathology Exam In process           Kevin Cintron MD  05/05/23  13:00 CDT    Time: Time spent at discharge 30 minutes     Part of this note may be an electronic transcription/translation of spoken language to printed text using the Dragon  Dictation System.

## 2023-05-06 LAB
ALBUMIN SERPL-MCNC: 3.3 G/DL (ref 3.5–5.2)
ALBUMIN/GLOB SERPL: 1.3 G/DL
ALP SERPL-CCNC: 127 U/L (ref 39–117)
ALT SERPL W P-5'-P-CCNC: 365 U/L (ref 1–41)
ANION GAP SERPL CALCULATED.3IONS-SCNC: 10 MMOL/L (ref 5–15)
AST SERPL-CCNC: 156 U/L (ref 1–40)
BASOPHILS # BLD AUTO: 0.08 10*3/MM3 (ref 0–0.2)
BASOPHILS NFR BLD AUTO: 0.6 % (ref 0–1.5)
BILIRUB SERPL-MCNC: 1.5 MG/DL (ref 0–1.2)
BUN SERPL-MCNC: 10 MG/DL (ref 6–20)
BUN/CREAT SERPL: 12.5 (ref 7–25)
CALCIUM SPEC-SCNC: 10.4 MG/DL (ref 8.6–10.5)
CHLORIDE SERPL-SCNC: 102 MMOL/L (ref 98–107)
CO2 SERPL-SCNC: 27 MMOL/L (ref 22–29)
CREAT SERPL-MCNC: 0.8 MG/DL (ref 0.76–1.27)
CYTO UR: NORMAL
DEPRECATED RDW RBC AUTO: 47.7 FL (ref 37–54)
EGFRCR SERPLBLD CKD-EPI 2021: 103.2 ML/MIN/1.73
EOSINOPHIL # BLD AUTO: 0.39 10*3/MM3 (ref 0–0.4)
EOSINOPHIL NFR BLD AUTO: 3 % (ref 0.3–6.2)
ERYTHROCYTE [DISTWIDTH] IN BLOOD BY AUTOMATED COUNT: 13.5 % (ref 12.3–15.4)
GLOBULIN UR ELPH-MCNC: 2.5 GM/DL
GLUCOSE SERPL-MCNC: 108 MG/DL (ref 65–99)
HCT VFR BLD AUTO: 40 % (ref 37.5–51)
HGB BLD-MCNC: 12.5 G/DL (ref 13–17.7)
IMM GRANULOCYTES # BLD AUTO: 0.23 10*3/MM3 (ref 0–0.05)
IMM GRANULOCYTES NFR BLD AUTO: 1.8 % (ref 0–0.5)
LAB AP CASE REPORT: NORMAL
LYMPHOCYTES # BLD AUTO: 1.51 10*3/MM3 (ref 0.7–3.1)
LYMPHOCYTES NFR BLD AUTO: 11.5 % (ref 19.6–45.3)
Lab: NORMAL
MCH RBC QN AUTO: 29.8 PG (ref 26.6–33)
MCHC RBC AUTO-ENTMCNC: 31.3 G/DL (ref 31.5–35.7)
MCV RBC AUTO: 95.2 FL (ref 79–97)
MONOCYTES # BLD AUTO: 1.46 10*3/MM3 (ref 0.1–0.9)
MONOCYTES NFR BLD AUTO: 11.1 % (ref 5–12)
NEUTROPHILS NFR BLD AUTO: 72 % (ref 42.7–76)
NEUTROPHILS NFR BLD AUTO: 9.44 10*3/MM3 (ref 1.7–7)
NRBC BLD AUTO-RTO: 0 /100 WBC (ref 0–0.2)
PATH REPORT.FINAL DX SPEC: NORMAL
PATH REPORT.GROSS SPEC: NORMAL
PLATELET # BLD AUTO: 315 10*3/MM3 (ref 140–450)
PMV BLD AUTO: 10.2 FL (ref 6–12)
POTASSIUM SERPL-SCNC: 3.8 MMOL/L (ref 3.5–5.2)
PROT SERPL-MCNC: 5.8 G/DL (ref 6–8.5)
RBC # BLD AUTO: 4.2 10*6/MM3 (ref 4.14–5.8)
SODIUM SERPL-SCNC: 139 MMOL/L (ref 136–145)
WBC NRBC COR # BLD: 13.11 10*3/MM3 (ref 3.4–10.8)

## 2023-05-06 PROCEDURE — 0 HYDROMORPHONE 1 MG/ML SOLUTION: Performed by: SPECIALIST

## 2023-05-06 PROCEDURE — G0378 HOSPITAL OBSERVATION PER HR: HCPCS

## 2023-05-06 PROCEDURE — 25010000002 CEFOXITIN PER 1 G: Performed by: SPECIALIST

## 2023-05-06 PROCEDURE — 85025 COMPLETE CBC W/AUTO DIFF WBC: CPT | Performed by: SPECIALIST

## 2023-05-06 PROCEDURE — 80053 COMPREHEN METABOLIC PANEL: CPT | Performed by: SPECIALIST

## 2023-05-06 PROCEDURE — 25010000002 ENOXAPARIN PER 10 MG: Performed by: SPECIALIST

## 2023-05-06 RX ADMIN — ENOXAPARIN SODIUM 30 MG: 100 INJECTION SUBCUTANEOUS at 13:33

## 2023-05-06 RX ADMIN — HYDROMORPHONE HYDROCHLORIDE 0.5 MG: 1 INJECTION, SOLUTION INTRAMUSCULAR; INTRAVENOUS; SUBCUTANEOUS at 21:19

## 2023-05-06 RX ADMIN — FAMOTIDINE 20 MG: 20 TABLET, FILM COATED ORAL at 20:23

## 2023-05-06 RX ADMIN — FAMOTIDINE 20 MG: 20 TABLET, FILM COATED ORAL at 08:27

## 2023-05-06 RX ADMIN — HYDROMORPHONE HYDROCHLORIDE 0.5 MG: 1 INJECTION, SOLUTION INTRAMUSCULAR; INTRAVENOUS; SUBCUTANEOUS at 06:08

## 2023-05-06 RX ADMIN — SODIUM CHLORIDE 2 G: 900 INJECTION INTRAVENOUS at 08:27

## 2023-05-06 RX ADMIN — SODIUM CHLORIDE 2 G: 900 INJECTION INTRAVENOUS at 20:23

## 2023-05-06 RX ADMIN — SUCRALFATE 1 G: 1 TABLET ORAL at 16:46

## 2023-05-06 RX ADMIN — DEXTROSE AND SODIUM CHLORIDE 125 ML/HR: 5; 450 INJECTION, SOLUTION INTRAVENOUS at 08:27

## 2023-05-06 RX ADMIN — SODIUM CHLORIDE 2 G: 900 INJECTION INTRAVENOUS at 15:17

## 2023-05-06 RX ADMIN — DEXTROSE AND SODIUM CHLORIDE 125 ML/HR: 5; 450 INJECTION, SOLUTION INTRAVENOUS at 23:59

## 2023-05-06 RX ADMIN — SUCRALFATE 1 G: 1 TABLET ORAL at 20:23

## 2023-05-06 RX ADMIN — SUCRALFATE 1 G: 1 TABLET ORAL at 08:27

## 2023-05-06 RX ADMIN — HYDROMORPHONE HYDROCHLORIDE 0.5 MG: 1 INJECTION, SOLUTION INTRAMUSCULAR; INTRAVENOUS; SUBCUTANEOUS at 16:46

## 2023-05-06 RX ADMIN — DEXTROSE AND SODIUM CHLORIDE 125 ML/HR: 5; 450 INJECTION, SOLUTION INTRAVENOUS at 15:17

## 2023-05-06 RX ADMIN — SODIUM CHLORIDE 2 G: 900 INJECTION INTRAVENOUS at 03:55

## 2023-05-06 RX ADMIN — HYDROCODONE BITARTRATE AND ACETAMINOPHEN 1 TABLET: 7.5; 325 TABLET ORAL at 23:59

## 2023-05-06 RX ADMIN — HYDROCODONE BITARTRATE AND ACETAMINOPHEN 1 TABLET: 7.5; 325 TABLET ORAL at 03:55

## 2023-05-06 RX ADMIN — HYDROCODONE BITARTRATE AND ACETAMINOPHEN 1 TABLET: 7.5; 325 TABLET ORAL at 09:21

## 2023-05-06 RX ADMIN — HYDROMORPHONE HYDROCHLORIDE 0.5 MG: 1 INJECTION, SOLUTION INTRAMUSCULAR; INTRAVENOUS; SUBCUTANEOUS at 11:46

## 2023-05-06 RX ADMIN — SUCRALFATE 1 G: 1 TABLET ORAL at 11:46

## 2023-05-06 RX ADMIN — HYDROCODONE BITARTRATE AND ACETAMINOPHEN 1 TABLET: 7.5; 325 TABLET ORAL at 13:33

## 2023-05-06 RX ADMIN — HYDROCODONE BITARTRATE AND ACETAMINOPHEN 1 TABLET: 7.5; 325 TABLET ORAL at 19:48

## 2023-05-06 NOTE — PLAN OF CARE
Goal Outcome Evaluation:  Plan of Care Reviewed With: patient        Progress: (P) improving     Voiding per urinal with good output. IVF, abx infusing per orders. Medicated x2 for pain. Abd dressings intact. Up ad darlin. Tolerating diet. VSS, safety maintained.

## 2023-05-06 NOTE — PROGRESS NOTES
Sharda Pedraza MD Progress Note     LOS: 0 days   Patient Care Team:  Provider, No Known as PCP - General        Subjective     Still not feeling great.  Having a cough which causes some discomfort    Objective     Vital Signs  Temp:  [97.5 °F (36.4 °C)-99 °F (37.2 °C)] 97.5 °F (36.4 °C)  Heart Rate:  [] 82  Resp:  [16-18] 16  BP: ()/(57-78) 120/75    Intake & Output (last 3 days)       05/03 0701 05/04 0700 05/04 0701 05/05 0700 05/05 0701  05/06 0700    I.V. (mL/kg)  3925 (34.7) 1941 (17.6)    IV Piggyback  100 200    Total Intake(mL/kg)  4025 (35.6) 2141 (19.5)    Urine (mL/kg/hr)  2850 (1.1) 800 (0.3)    Drains  20 10    Total Output  2870 810    Net  +1155 +1331           Urine Unmeasured Occurrence 1 x 1 x 6 x          Physical Exam:     General Appearance:    Alert, cooperative, in no acute distress   Lungs:     respirations regular, even and unlabored    Heart:    Regular rhythm and normal rate, normal S1 and S2, no            murmur, no gallop, no rub   Chest Wall:    No abnormalities observed   Abdomen:      Soft mildly tender around his incisions   Extremities: No edema,    Results Review:     I reviewed the patient's new clinical results.    Lab Results (last 72 hours)     Procedure Component Value Units Date/Time    Comprehensive Metabolic Panel [[id number]]  (Abnormal) Collected: 05/06/23 0520    Specimen: Blood Updated: 05/06/23 0609     Glucose 108 mg/dL      BUN 10 mg/dL      Creatinine 0.80 mg/dL      Sodium 139 mmol/L      Potassium 3.8 mmol/L      Chloride 102 mmol/L      CO2 27.0 mmol/L      Calcium 10.4 mg/dL      Total Protein 5.8 g/dL      Albumin 3.3 g/dL      ALT (SGPT) 365 U/L      AST (SGOT) 156 U/L      Alkaline Phosphatase 127 U/L      Total Bilirubin 1.5 mg/dL      Globulin 2.5 gm/dL      A/G Ratio 1.3 g/dL      BUN/Creatinine Ratio 12.5     Anion Gap 10.0 mmol/L      eGFR 103.2 mL/min/1.73     Narrative:      GFR Normal >60  Chronic Kidney Disease <60  Kidney Failure <15       CBC & Differential [[id number]]  (Abnormal) Collected: 05/06/23 0520    Specimen: Blood Updated: 05/06/23 0546    Narrative:      The following orders were created for panel order CBC & Differential.  Procedure                               Abnormality         Status                     ---------                               -----------         ------                     CBC Auto Differential[[id number]]        Abnormal            Final result                 Please view results for these tests on the individual orders.    CBC Auto Differential [[id number]]  (Abnormal) Collected: 05/06/23 0520    Specimen: Blood Updated: 05/06/23 0546     WBC 13.11 10*3/mm3      RBC 4.20 10*6/mm3      Hemoglobin 12.5 g/dL      Hematocrit 40.0 %      MCV 95.2 fL      MCH 29.8 pg      MCHC 31.3 g/dL      RDW 13.5 %      RDW-SD 47.7 fl      MPV 10.2 fL      Platelets 315 10*3/mm3      Neutrophil % 72.0 %      Lymphocyte % 11.5 %      Monocyte % 11.1 %      Eosinophil % 3.0 %      Basophil % 0.6 %      Immature Grans % 1.8 %      Neutrophils, Absolute 9.44 10*3/mm3      Lymphocytes, Absolute 1.51 10*3/mm3      Monocytes, Absolute 1.46 10*3/mm3      Eosinophils, Absolute 0.39 10*3/mm3      Basophils, Absolute 0.08 10*3/mm3      Immature Grans, Absolute 0.23 10*3/mm3      nRBC 0.0 /100 WBC     Comprehensive Metabolic Panel [[id number]]  (Abnormal) Collected: 05/05/23 1040    Specimen: Blood Updated: 05/05/23 1105     Glucose 127 mg/dL      BUN 9 mg/dL      Creatinine 0.66 mg/dL      Sodium 137 mmol/L      Potassium 4.1 mmol/L      Chloride 101 mmol/L      CO2 26.0 mmol/L      Calcium 10.5 mg/dL      Total Protein 5.9 g/dL      Albumin 3.3 g/dL      ALT (SGPT) 431 U/L      AST (SGOT) 263 U/L      Alkaline Phosphatase 115 U/L      Total Bilirubin 4.0 mg/dL      Globulin 2.6 gm/dL      A/G Ratio 1.3 g/dL      BUN/Creatinine Ratio 13.6     Anion Gap 10.0 mmol/L      eGFR 109.4 mL/min/1.73     Narrative:      GFR Normal >60  Chronic Kidney  Disease <60  Kidney Failure <15      Amylase [[id number]]  (Normal) Collected: 05/05/23 1040    Specimen: Blood Updated: 05/05/23 1103     Amylase 55 U/L     Sedimentation Rate [[id number]]  (Abnormal) Collected: 05/05/23 0459    Specimen: Blood Updated: 05/05/23 0642     Sed Rate 38 mm/hr     Comprehensive Metabolic Panel [[id number]]  (Abnormal) Collected: 05/05/23 0459    Specimen: Blood Updated: 05/05/23 0539     Glucose 138 mg/dL      BUN 9 mg/dL      Creatinine 0.66 mg/dL      Sodium 138 mmol/L      Potassium 4.1 mmol/L      Chloride 104 mmol/L      CO2 27.0 mmol/L      Calcium 10.2 mg/dL      Total Protein 5.9 g/dL      Albumin 3.4 g/dL      ALT (SGPT) 401 U/L      AST (SGOT) 291 U/L      Alkaline Phosphatase 112 U/L      Total Bilirubin 5.1 mg/dL      Globulin 2.5 gm/dL      A/G Ratio 1.4 g/dL      BUN/Creatinine Ratio 13.6     Anion Gap 7.0 mmol/L      eGFR 109.4 mL/min/1.73     Narrative:      GFR Normal >60  Chronic Kidney Disease <60  Kidney Failure <15      CBC (No Diff) [[id number]]  (Abnormal) Collected: 05/05/23 0459    Specimen: Blood Updated: 05/05/23 0531     WBC 20.78 10*3/mm3      RBC 4.22 10*6/mm3      Hemoglobin 12.3 g/dL      Hematocrit 39.6 %      MCV 93.8 fL      MCH 29.1 pg      MCHC 31.1 g/dL      RDW 13.2 %      RDW-SD 45.3 fl      MPV 9.9 fL      Platelets 326 10*3/mm3     Tissue Pathology Exam [[id number]] Collected: 05/04/23 1037    Specimen: Tissue from Gallbladder Updated: 05/04/23 1259    POC Glucose Once [[id number]]  (Abnormal) Collected: 05/04/23 0740    Specimen: Blood Updated: 05/04/23 0751     Glucose 167 mg/dL      Comment: : [id number] Pma Duarte) JessicaMeter ID: [id number]       Amylase [[id number]]  (Normal) Collected: 05/04/23 0418    Specimen: Blood Updated: 05/04/23 0520     Amylase 49 U/L     Comprehensive Metabolic Panel [[id number]]  (Abnormal) Collected: 05/04/23 0418    Specimen: Blood Updated: 05/04/23 0520     Glucose 140 mg/dL      BUN 11 mg/dL      Creatinine  0.75 mg/dL      Sodium 136 mmol/L      Potassium 3.8 mmol/L      Chloride 99 mmol/L      CO2 27.0 mmol/L      Calcium 10.9 mg/dL      Total Protein 7.0 g/dL      Albumin 4.0 g/dL      ALT (SGPT) 19 U/L      AST (SGOT) 17 U/L      Alkaline Phosphatase 78 U/L      Total Bilirubin 1.1 mg/dL      Globulin 3.0 gm/dL      A/G Ratio 1.3 g/dL      BUN/Creatinine Ratio 14.7     Anion Gap 10.0 mmol/L      eGFR 105.3 mL/min/1.73     Narrative:      GFR Normal >60  Chronic Kidney Disease <60  Kidney Failure <15      Protime-INR [[id number]]  (Abnormal) Collected: 05/04/23 0418    Specimen: Blood Updated: 05/04/23 0510     Protime 14.7 Seconds      INR 1.13    CBC Auto Differential [[id number]]  (Abnormal) Collected: 05/04/23 0418    Specimen: Blood Updated: 05/04/23 0500     WBC 17.23 10*3/mm3      RBC 5.00 10*6/mm3      Hemoglobin 14.5 g/dL      Hematocrit 47.0 %      MCV 94.0 fL      MCH 29.0 pg      MCHC 30.9 g/dL      RDW 13.1 %      RDW-SD 45.0 fl      MPV 9.9 fL      Platelets 372 10*3/mm3      Neutrophil % 81.3 %      Lymphocyte % 4.7 %      Monocyte % 10.9 %      Eosinophil % 1.0 %      Basophil % 0.6 %      Immature Grans % 1.5 %      Neutrophils, Absolute 14.00 10*3/mm3      Lymphocytes, Absolute 0.81 10*3/mm3      Monocytes, Absolute 1.88 10*3/mm3      Eosinophils, Absolute 0.18 10*3/mm3      Basophils, Absolute 0.10 10*3/mm3      Immature Grans, Absolute 0.26 10*3/mm3      nRBC 0.0 /100 WBC     Comprehensive Metabolic Panel [[id number]]  (Abnormal) Collected: 05/03/23 1150    Specimen: Blood Updated: 05/03/23 1234     Glucose 147 mg/dL      BUN 14 mg/dL      Creatinine 0.82 mg/dL      Sodium 141 mmol/L      Potassium 4.5 mmol/L      Chloride 104 mmol/L      CO2 29.0 mmol/L      Calcium 11.1 mg/dL      Total Protein 7.7 g/dL      Albumin 4.7 g/dL      ALT (SGPT) 19 U/L      AST (SGOT) 20 U/L      Alkaline Phosphatase 89 U/L      Total Bilirubin 0.7 mg/dL      Globulin 3.0 gm/dL      A/G Ratio 1.6 g/dL       BUN/Creatinine Ratio 17.1     Anion Gap 8.0 mmol/L      eGFR 102.5 mL/min/1.73     Narrative:      GFR Normal >60  Chronic Kidney Disease <60  Kidney Failure <15      High Sensitivity Troponin T [[id number]]  (Normal) Collected: 05/03/23 1150    Specimen: Blood Updated: 05/03/23 1232     HS Troponin T 7 ng/L     Narrative:      High Sensitive Troponin T Reference Range:  <10.0 ng/L- Negative Female for AMI  <15.0 ng/L- Negative Male for AMI  >=10 - Abnormal Female indicating possible myocardial injury.  >=15 - Abnormal Male indicating possible myocardial injury.   Clinicians would have to utilize clinical acumen, EKG, Troponin, and serial changes to determine if it is an Acute Myocardial Infarction or myocardial injury due to an underlying chronic condition.         Lipase [[id number]]  (Normal) Collected: 05/03/23 1150    Specimen: Blood Updated: 05/03/23 1230     Lipase 25 U/L     Urinalysis With Culture If Indicated - Urine, Clean Catch [[id number]]  (Normal) Collected: 05/03/23 1145    Specimen: Urine, Clean Catch Updated: 05/03/23 1222     Color, UA Yellow     Appearance, UA Clear     pH, UA 6.5     Specific Gravity, UA 1.012     Glucose, UA Negative     Ketones, UA Negative     Bilirubin, UA Negative     Blood, UA Negative     Protein, UA Negative     Leuk Esterase, UA Negative     Nitrite, UA Negative     Urobilinogen, UA 0.2 E.U./dL    Narrative:      In absence of clinical symptoms, the presence of pyuria, bacteria, and/or nitrites on the urinalysis result does not correlate with infection.  Urine microscopic not indicated.    CBC & Differential [[id number]]  (Abnormal) Collected: 05/03/23 1150    Specimen: Blood Updated: 05/03/23 1214    Narrative:      The following orders were created for panel order CBC & Differential.  Procedure                               Abnormality         Status                     ---------                               -----------         ------                     CBC Auto  Differential[[id number]]        Abnormal            Final result                 Please view results for these tests on the individual orders.    CBC Auto Differential [[id number]]  (Abnormal) Collected: 05/03/23 1150    Specimen: Blood Updated: 05/03/23 1214     WBC 13.71 10*3/mm3      RBC 5.31 10*6/mm3      Hemoglobin 15.8 g/dL      Hematocrit 49.5 %      MCV 93.2 fL      MCH 29.8 pg      MCHC 31.9 g/dL      RDW 13.2 %      RDW-SD 45.1 fl      MPV 9.7 fL      Platelets 387 10*3/mm3      Neutrophil % 76.2 %      Lymphocyte % 9.3 %      Monocyte % 9.0 %      Eosinophil % 1.8 %      Basophil % 0.7 %      Immature Grans % 3.0 %      Neutrophils, Absolute 10.45 10*3/mm3      Lymphocytes, Absolute 1.27 10*3/mm3      Monocytes, Absolute 1.24 10*3/mm3      Eosinophils, Absolute 0.24 10*3/mm3      Basophils, Absolute 0.10 10*3/mm3      Immature Grans, Absolute 0.41 10*3/mm3      nRBC 0.0 /100 WBC         Imaging Results (Last 72 Hours)     Procedure Component Value Units Date/Time    FL Cholangiogram Operative [[id number]] Collected: 05/04/23 1241     Updated: 05/04/23 1245    Narrative:      FL CHOLANGIOGRAM OPERATIVE- 5/4/2023 10:10 AM CDT     HISTORY: cholangiogram; K76.89-Other specified diseases of liver;  K81.0-Acute cholecystitis     COMPARISON: None     FLUOROSCOPY TIME: 9 seconds     FLUOROSCOPY DOSE: 5 mGy     NUMBER OF IMAGES: 11       Impression:      Contrast was injected through the cystic duct stump, and multiple  fluoroscopic images of the RIGHT upper quadrant were obtained  intraoperatively. The opacified intrahepatic and extrahepatic ducts  demonstrate no filling defects or obstruction. Contrast is noted in the  small bowel. Surgical instruments and clips are noted in the RIGHT upper  quadrant.     Please refer to the operative note for more details.   This report was finalized on 05/04/2023 12:41 by Dr Jonathan Murcia, .    FL C Arm During Surgery [[id number]] Resulted: 05/04/23 1025     Updated: 05/04/23 1025     Narrative:      This procedure was auto-finalized with no dictation required.    US Gallbladder [[id number]] Collected: 05/03/23 1802     Updated: 05/03/23 1810    Narrative:      EXAM/TECHNIQUE: US GALLBLADDER-     INDICATION: RIGHT upper quadrant abdominal pain, concern for acute  cholecystitis     COMPARISON: Same-day CT     FINDINGS:     Upper abdominal aorta and IVC are unremarkable. Pancreas is obscured by  overlying bowel gas.     Hepatic echogenicity is slightly increased. No focal liver mass  identified. No liver echotexture coarsening or surface contour  nodularity.     Gallbladder is mildly distended. Multiple small echogenic intraluminal  mobile foci are favored to represent small gallstones. Mild diffuse  gallbladder wall thickening. No biliary ductal dilatation. Common bile  duct is 5 mm diameter.     RIGHT kidney is 11 cm in length and demonstrates normal cortical  thickness and echogenicity. No shadowing calculi or hydronephrosis.       Impression:         1.  Mild gallbladder distention, mild gallbladder wall thickening, and  small echogenic intraluminal foci favored to represent gallstones.  Findings are suspicious for acute cholecystitis in the appropriate  clinical setting. No pericholecystic fluid is present.     2.  Hepatic steatosis.  This report was finalized on 05/03/2023 18:07 by Dr. Lex Lazo MD.    CT Abdomen Pelvis With Contrast [[id number]] Collected: 05/03/23 1543     Updated: 05/03/23 1552    Narrative:      EXAM: CT ABDOMEN PELVIS W CONTRAST- - 5/3/2023 3:30 PM CDT     HISTORY: Pancreatitis, acute, severe. Technologist obtained history  includes abdominal pain starting today, history of GERD, bilateral flank  pain, history of kidney stones, mid and upper abdominal pain with  burning sensation.     COMPARISON: No existing relevant imaging studies available.      DOSE LENGTH PRODUCT: 558 mGy cm. Automatic exposure control was utilized  to make radiation dose as low as reasonably  achievable.     TECHNIQUE: Enhanced  axial images of the abdomen and pelvis obtained  with multiplanar reformats.     FINDINGS:  VISUALIZED CHEST: No pleural or pericardial effusion. Lung bases clear.     LIVER: 5 mm hypodensity in the LEFT liver lobe on axial series 2, image  14, too small to further characterize by any modality.     BILIARY: Haziness of the gallbladder contours. No calcified gallstone or  bile duct dilation demonstrated.     PANCREAS: Normal pancreas contour and enhancement.     SPLEEN: Normal spleen size. Small adjacent splenule.     ADRENAL: Normal appearance of the bilateral adrenal glands.     GENITOURINARY:   No hydronephrosis, urolithiasis or solid renal lesion.    Urinary bladder is within normal limits.  Normal appearance of the prostate.     PERITONEUM: No free air or ascites.     GI TRACT: Normal configuration of the stomach and duodenum.  No  abnormally dilated loops of bowel or bowel wall thickening.  Normal  appendix on axial images 52-70.     VESSELS: Aorta normal in course and caliber. Main branch vessels appear  patent. Circumaortic LEFT renal vein. Renal veins appear patent.     RETROPERITONEUM: No mass, lymphadenopathy or hemorrhage.     SOFT TISSUES: Normal appearance of the overlying abdominal soft tissues.        BONES: No acute or aggressive bony lesion.           Impression:      1. Gallbladder appears distended with hazy contours, concerning for  acute cholecystitis. No bile duct dilation. No calcified gallstone by  CT, but note that ultrasound is a more sensitive in assessment for  gallstones.  2. LEFT liver with a 5 mm hypodensity, too small to further characterize  by any modality. This is statistically favored to be benign the patient  has no known history of malignancy.     This report was finalized on 05/03/2023 15:49 by Dr Flora Phan MD.    XR Abdomen KUB [[id number]] Collected: 05/03/23 1343     Updated: 05/03/23 1347    Narrative:      EXAMINATION: XR ABDOMEN  KUB- 5/3/2023 1:43 PM CDT     HISTORY: Acute abdominal pain     REPORT: Supine imaging of the abdomen was performed.     COMPARISON: There are no correlative imaging studies for comparison.     The bowel gas pattern is within normal limits. There are no definite  urinary tract calculi. The osseous structures are unremarkable.       Impression:      No acute abnormality.  This report was finalized on 05/03/2023 13:44 by Dr. Sal Sanz MD.              Assessment & Plan       Acute cholecystitis      His liver enzymes are trending down but still elevated.  Still does not feel great.  I think keeping him 1 more day is advisable to watch the trend.      Sharda Pedraza MD  05/06/23  06:58 CDT

## 2023-05-06 NOTE — PLAN OF CARE
"Goal Outcome Evaluation:  Plan of Care Reviewed With: patient, spouse        Progress: no change  Outcome Evaluation: A&OX4, VSS. C/o ABD pain, non-pharma techniques re-educated for pain relief (pillow, ice, etc.), requesintg IV pain med frequently. C/o cough and \"green sputumn\", stressed improtance of using IS, cough/deep breathing and ambualting frequently. Encouraged activity upon rounds, pt states he ambulated twice, both the length of hallway and that was plenty. ABD lap incision CDI, FRANCO drain to RLQ CDI with minimal output. Assist x stand-by, voiding w/o difficulty. IVF and IV ABX given, on room air and . Regular diet, tolerating w/o trouble, no c.o nausea. Lovenox and SCD's for VTE prevention. Call light in reach, safety maintained and continue to monitor.  "

## 2023-05-07 ENCOUNTER — READMISSION MANAGEMENT (OUTPATIENT)
Dept: CALL CENTER | Facility: HOSPITAL | Age: 58
End: 2023-05-07
Payer: COMMERCIAL

## 2023-05-07 VITALS
BODY MASS INDEX: 33.98 KG/M2 | HEIGHT: 71 IN | HEART RATE: 76 BPM | TEMPERATURE: 97.5 F | RESPIRATION RATE: 16 BRPM | SYSTOLIC BLOOD PRESSURE: 131 MMHG | DIASTOLIC BLOOD PRESSURE: 79 MMHG | WEIGHT: 242.7 LBS | OXYGEN SATURATION: 92 %

## 2023-05-07 LAB
ALBUMIN SERPL-MCNC: 3.3 G/DL (ref 3.5–5.2)
ALBUMIN/GLOB SERPL: 1.2 G/DL
ALP SERPL-CCNC: 148 U/L (ref 39–117)
ALT SERPL W P-5'-P-CCNC: 307 U/L (ref 1–41)
ANION GAP SERPL CALCULATED.3IONS-SCNC: 8 MMOL/L (ref 5–15)
AST SERPL-CCNC: 122 U/L (ref 1–40)
BASOPHILS # BLD AUTO: 0.06 10*3/MM3 (ref 0–0.2)
BASOPHILS NFR BLD AUTO: 0.5 % (ref 0–1.5)
BILIRUB SERPL-MCNC: 1.1 MG/DL (ref 0–1.2)
BUN SERPL-MCNC: 8 MG/DL (ref 6–20)
BUN/CREAT SERPL: 11.3 (ref 7–25)
CALCIUM SPEC-SCNC: 10.8 MG/DL (ref 8.6–10.5)
CHLORIDE SERPL-SCNC: 102 MMOL/L (ref 98–107)
CO2 SERPL-SCNC: 29 MMOL/L (ref 22–29)
CREAT SERPL-MCNC: 0.71 MG/DL (ref 0.76–1.27)
DEPRECATED RDW RBC AUTO: 48.1 FL (ref 37–54)
EGFRCR SERPLBLD CKD-EPI 2021: 107 ML/MIN/1.73
EOSINOPHIL # BLD AUTO: 0.43 10*3/MM3 (ref 0–0.4)
EOSINOPHIL NFR BLD AUTO: 3.7 % (ref 0.3–6.2)
ERYTHROCYTE [DISTWIDTH] IN BLOOD BY AUTOMATED COUNT: 13.4 % (ref 12.3–15.4)
GLOBULIN UR ELPH-MCNC: 2.8 GM/DL
GLUCOSE SERPL-MCNC: 122 MG/DL (ref 65–99)
HCT VFR BLD AUTO: 41.6 % (ref 37.5–51)
HGB BLD-MCNC: 12.5 G/DL (ref 13–17.7)
IMM GRANULOCYTES # BLD AUTO: 0.24 10*3/MM3 (ref 0–0.05)
IMM GRANULOCYTES NFR BLD AUTO: 2.1 % (ref 0–0.5)
LYMPHOCYTES # BLD AUTO: 1.36 10*3/MM3 (ref 0.7–3.1)
LYMPHOCYTES NFR BLD AUTO: 11.8 % (ref 19.6–45.3)
MCH RBC QN AUTO: 29 PG (ref 26.6–33)
MCHC RBC AUTO-ENTMCNC: 30 G/DL (ref 31.5–35.7)
MCV RBC AUTO: 96.5 FL (ref 79–97)
MONOCYTES # BLD AUTO: 1.22 10*3/MM3 (ref 0.1–0.9)
MONOCYTES NFR BLD AUTO: 10.6 % (ref 5–12)
NEUTROPHILS NFR BLD AUTO: 71.3 % (ref 42.7–76)
NEUTROPHILS NFR BLD AUTO: 8.22 10*3/MM3 (ref 1.7–7)
NRBC BLD AUTO-RTO: 0 /100 WBC (ref 0–0.2)
PLATELET # BLD AUTO: 336 10*3/MM3 (ref 140–450)
PMV BLD AUTO: 10.3 FL (ref 6–12)
POTASSIUM SERPL-SCNC: 3.8 MMOL/L (ref 3.5–5.2)
PROT SERPL-MCNC: 6.1 G/DL (ref 6–8.5)
RBC # BLD AUTO: 4.31 10*6/MM3 (ref 4.14–5.8)
SODIUM SERPL-SCNC: 139 MMOL/L (ref 136–145)
WBC NRBC COR # BLD: 11.53 10*3/MM3 (ref 3.4–10.8)

## 2023-05-07 PROCEDURE — 25010000002 CEFOXITIN PER 1 G: Performed by: SPECIALIST

## 2023-05-07 PROCEDURE — G0378 HOSPITAL OBSERVATION PER HR: HCPCS

## 2023-05-07 PROCEDURE — 25010000002 ENOXAPARIN PER 10 MG: Performed by: SPECIALIST

## 2023-05-07 PROCEDURE — 80053 COMPREHEN METABOLIC PANEL: CPT | Performed by: SPECIALIST

## 2023-05-07 PROCEDURE — 85025 COMPLETE CBC W/AUTO DIFF WBC: CPT | Performed by: SPECIALIST

## 2023-05-07 PROCEDURE — 0 HYDROMORPHONE 1 MG/ML SOLUTION: Performed by: SPECIALIST

## 2023-05-07 RX ADMIN — HYDROCODONE BITARTRATE AND ACETAMINOPHEN 1 TABLET: 7.5; 325 TABLET ORAL at 05:58

## 2023-05-07 RX ADMIN — FAMOTIDINE 20 MG: 20 TABLET, FILM COATED ORAL at 09:04

## 2023-05-07 RX ADMIN — ENOXAPARIN SODIUM 30 MG: 100 INJECTION SUBCUTANEOUS at 00:00

## 2023-05-07 RX ADMIN — SODIUM CHLORIDE 2 G: 900 INJECTION INTRAVENOUS at 03:21

## 2023-05-07 RX ADMIN — SUCRALFATE 1 G: 1 TABLET ORAL at 09:04

## 2023-05-07 RX ADMIN — HYDROMORPHONE HYDROCHLORIDE 0.5 MG: 1 INJECTION, SOLUTION INTRAMUSCULAR; INTRAVENOUS; SUBCUTANEOUS at 03:20

## 2023-05-07 NOTE — PLAN OF CARE
Goal Outcome Evaluation:  Plan of Care Reviewed With: patient    AAO X 4 Midline incision CDI. FRANCO with small amount of serosanguineous drainage.  Pt with complaints of constant pain, see MAR for medication admin,  Wet cough and coarse lung sounds,  Pt encouraged to use IS as well as ambulate.  Movement and ambulation limited by pain per patient

## 2023-05-07 NOTE — OUTREACH NOTE
Prep Survey    Flowsheet Row Responses   [facility] facility patient discharged from? [place]   Is LACE score < 7 ? Yes   Eligibility [facility]   Date of Admission 05/03/23   Date of Discharge 05/07/23   Discharge Disposition Home or Self Care   Discharge diagnosis Acute cholecystitis cholelithiasis, lap sharifa, liver nodule   Does the patient have one of the following disease processes/diagnoses(primary or secondary)? General Surgery   Does the patient have Home health ordered? No   Is there a DME ordered? No   Prep survey completed? Yes          Danielle Baxter Registered Nurse

## 2023-05-07 NOTE — PLAN OF CARE
Goal Outcome Evaluation:  Plan of Care Reviewed With: patient, spouse        Progress: no change  Outcome Evaluation: A&OX4, VSS. ABD lap x4 and FRANCO drain CDI. C/o cough and ABD pain, encouraged ambulation and IS use to prevent pneumonia. PRN pain med available. SBA, on room air, and voiding w/o difficulty. Lovenox for VTE prevention. D/c home this shift-educated on FRANCO drain care and how to empty. Call light in reach, safety maintained.

## 2023-05-08 ENCOUNTER — TRANSITIONAL CARE MANAGEMENT TELEPHONE ENCOUNTER (OUTPATIENT)
Dept: CALL CENTER | Facility: HOSPITAL | Age: 58
End: 2023-05-08
Payer: COMMERCIAL

## 2023-05-08 NOTE — OUTREACH NOTE
Call Center TCM Note    Flowsheet Row Responses   [facility] patient discharged from? [place]   Does the patient have one of the following disease processes/diagnoses(primary or secondary)? General Surgery   TCM attempt successful? No   Unsuccessful attempts Attempt 1          Corrine Vázquez RN    5/8/2023, 10:55 CDT

## 2023-05-08 NOTE — OUTREACH NOTE
Call Center TCM Note    Flowsheet Row Responses   [facility] patient discharged from? [place]   Does the patient have one of the following disease processes/diagnoses(primary or secondary)? General Surgery   TCM attempt successful? Yes   Call start time 1442   Call end time 1454   Discharge diagnosis Acute cholecystitis cholelithiasis, lap sharifa, liver nodule   Does the patient have all medications related to this admission filled (includes all antibiotics, pain medications, etc.) Yes   Is the patient taking all medications as directed (includes completed medication regime)? Yes   Comments f/u with surgeon on 5/10   Does the patient have an appointment with their PCP within 7 days of discharge? No   Nursing Interventions Patient desires to follow up with specialty only   Has home health visited the patient within 72 hours of discharge? N/A   Psychosocial issues? No   Did the patient receive a copy of their discharge instructions? Yes   Nursing interventions Reviewed instructions with patient   What is the patient's perception of their health status since discharge? Improving   Nursing interventions Nurse provided patient education   Is the patient /caregiver able to teach back basic post-op care? Lifting as instructed by MD in discharge instructions, Do not remove steri-strips, Keep incision areas clean,dry and protected, Take showers only when approved by MD-sponge bathe until then, Drive as instructed by MD in discharge instructions, Practice 'cough and deep breath', Continue use of incentive spirometry at least 1 week post discharge, No tub bath, swimming, or hot tub until instructed by MD   Is the patient/caregiver able to teach back signs and symptoms of incisional infection? Fever, Pus or odor from incision, Incisional warmth, Increased drainage or bleeding, Increased redness, swelling or pain at the incisonal site   Is the patient/caregiver able to teach back the hierarchy of who to call/visit for  symptoms/problems? PCP, Specialist, Home health nurse, Urgent Care, ED, 911 Yes   TCM call completed? Yes   Wrap up additional comments Doing well, all concerns addressed, patient will be following up with his surgeon.   Call end time 1423   Would this patient benefit from a Referral to [facility] Social Work? No   Is the patient interested in additional calls from an ambulatory ?  NOTE:  applies to high risk patients requiring additional follow-up. No          Corrine Vázquez RN    5/8/2023, 14:54 CDT

## 2023-05-10 ENCOUNTER — OFFICE VISIT (OUTPATIENT)
Dept: SURGERY | Facility: CLINIC | Age: 58
End: 2023-05-10
Payer: COMMERCIAL

## 2023-05-10 ENCOUNTER — LAB (OUTPATIENT)
Dept: LAB | Facility: HOSPITAL | Age: 58
End: 2023-05-10
Payer: COMMERCIAL

## 2023-05-10 VITALS
DIASTOLIC BLOOD PRESSURE: 98 MMHG | WEIGHT: 242.51 LBS | OXYGEN SATURATION: 94 % | SYSTOLIC BLOOD PRESSURE: 151 MMHG | HEIGHT: 71 IN | BODY MASS INDEX: 33.95 KG/M2 | HEART RATE: 105 BPM

## 2023-05-10 DIAGNOSIS — K81.0 ACUTE CHOLECYSTITIS: ICD-10-CM

## 2023-05-10 DIAGNOSIS — K81.0 ACUTE CHOLECYSTITIS: Primary | ICD-10-CM

## 2023-05-10 LAB
ALBUMIN SERPL-MCNC: 3.8 G/DL (ref 3.5–5.2)
ALBUMIN/GLOB SERPL: 1.1 G/DL
ALP SERPL-CCNC: 167 U/L (ref 39–117)
ALT SERPL W P-5'-P-CCNC: 243 U/L (ref 1–41)
AMYLASE SERPL-CCNC: 54 U/L (ref 28–100)
ANION GAP SERPL CALCULATED.3IONS-SCNC: 10 MMOL/L (ref 5–15)
AST SERPL-CCNC: 78 U/L (ref 1–40)
BASOPHILS # BLD AUTO: 0.1 10*3/MM3 (ref 0–0.2)
BASOPHILS NFR BLD AUTO: 0.8 % (ref 0–1.5)
BILIRUB SERPL-MCNC: 0.8 MG/DL (ref 0–1.2)
BUN SERPL-MCNC: 19 MG/DL (ref 6–20)
BUN/CREAT SERPL: 22.4 (ref 7–25)
CALCIUM SPEC-SCNC: 11.1 MG/DL (ref 8.6–10.5)
CHLORIDE SERPL-SCNC: 104 MMOL/L (ref 98–107)
CO2 SERPL-SCNC: 28 MMOL/L (ref 22–29)
CREAT SERPL-MCNC: 0.85 MG/DL (ref 0.76–1.27)
CRP SERPL-MCNC: 1.75 MG/DL (ref 0–0.5)
DEPRECATED RDW RBC AUTO: 47.1 FL (ref 37–54)
EGFRCR SERPLBLD CKD-EPI 2021: 101.4 ML/MIN/1.73
EOSINOPHIL # BLD AUTO: 0.36 10*3/MM3 (ref 0–0.4)
EOSINOPHIL NFR BLD AUTO: 3 % (ref 0.3–6.2)
ERYTHROCYTE [DISTWIDTH] IN BLOOD BY AUTOMATED COUNT: 13.4 % (ref 12.3–15.4)
ERYTHROCYTE [SEDIMENTATION RATE] IN BLOOD: 59 MM/HR (ref 0–20)
GLOBULIN UR ELPH-MCNC: 3.5 GM/DL
GLUCOSE SERPL-MCNC: 95 MG/DL (ref 65–99)
HCT VFR BLD AUTO: 46.6 % (ref 37.5–51)
HGB BLD-MCNC: 14.3 G/DL (ref 13–17.7)
IMM GRANULOCYTES # BLD AUTO: 0.33 10*3/MM3 (ref 0–0.05)
IMM GRANULOCYTES NFR BLD AUTO: 2.8 % (ref 0–0.5)
LIPASE SERPL-CCNC: 39 U/L (ref 13–60)
LYMPHOCYTES # BLD AUTO: 1.42 10*3/MM3 (ref 0.7–3.1)
LYMPHOCYTES NFR BLD AUTO: 12 % (ref 19.6–45.3)
MCH RBC QN AUTO: 29 PG (ref 26.6–33)
MCHC RBC AUTO-ENTMCNC: 30.7 G/DL (ref 31.5–35.7)
MCV RBC AUTO: 94.5 FL (ref 79–97)
MONOCYTES # BLD AUTO: 1.24 10*3/MM3 (ref 0.1–0.9)
MONOCYTES NFR BLD AUTO: 10.5 % (ref 5–12)
NEUTROPHILS NFR BLD AUTO: 70.9 % (ref 42.7–76)
NEUTROPHILS NFR BLD AUTO: 8.41 10*3/MM3 (ref 1.7–7)
NRBC BLD AUTO-RTO: 0 /100 WBC (ref 0–0.2)
PLATELET # BLD AUTO: 400 10*3/MM3 (ref 140–450)
PMV BLD AUTO: 10.1 FL (ref 6–12)
POTASSIUM SERPL-SCNC: 4 MMOL/L (ref 3.5–5.2)
PROT SERPL-MCNC: 7.3 G/DL (ref 6–8.5)
RBC # BLD AUTO: 4.93 10*6/MM3 (ref 4.14–5.8)
SODIUM SERPL-SCNC: 142 MMOL/L (ref 136–145)
WBC NRBC COR # BLD: 11.86 10*3/MM3 (ref 3.4–10.8)

## 2023-05-10 PROCEDURE — 80053 COMPREHEN METABOLIC PANEL: CPT

## 2023-05-10 PROCEDURE — 85025 COMPLETE CBC W/AUTO DIFF WBC: CPT

## 2023-05-10 PROCEDURE — 36415 COLL VENOUS BLD VENIPUNCTURE: CPT

## 2023-05-10 PROCEDURE — 83690 ASSAY OF LIPASE: CPT

## 2023-05-10 PROCEDURE — 86140 C-REACTIVE PROTEIN: CPT

## 2023-05-10 PROCEDURE — 82150 ASSAY OF AMYLASE: CPT

## 2023-05-10 PROCEDURE — 85652 RBC SED RATE AUTOMATED: CPT

## 2023-05-10 NOTE — PATIENT INSTRUCTIONS
Thank you for coming today Mr. Sinclair I think you look much more calm and much more happy today when than when I saw you last week!  I think everything looks very good from your surgery I will see you back in 6 to 8 weeks whenever you are back from [place] and have the liver functions completed prior to your follow-up.  Have a great trip back to [place] no lifting or straining pulling tugging until mid June.

## 2023-05-10 NOTE — PROGRESS NOTES
Office Established Patient Note:     Referring Provider: Leidy Avalos nurse practitioner    Chief complaint follow-up laparoscopic cholecystectomy 5/5/2023    Subjective .     History of present illness:  .Mr. Sinclair is a 57 y.o. who is here for evaluation of midepigastric right upper quadrant pain.  He states it intermittently for the past 2 years after eating certain foods he would feel full and bloated, he had an episode of significant pain 2 years prior and had upper and lower endoscopy, also endoscopic ultrasound that a question of a lesion in the stomach but this subsequently resolved.  His CT scan showed no abnormalities his ultrasound was unremarkable of his gallbladder in the past he has been doing well until 3 days ago after eating he was full, bloated, and central abdominal pain.  He changed insurance products on 30 April and new product on 1 May and wanted to wait until this occurred he has had pain for the past 3 days that is progressive and on his scans today he has gallbladder that is distended slightly thickened wall, no stones and also on his CAT scan there is significant distention and a very mild thickening.  It is presumed that he has acute cholecystitis a calculus and with the above problem he is for admission, IV antibiotics and proceeding with laparoscopic exploration and cholecystectomy and treatment the above problem on Thursday, 4 May.  He is aware of the procedure as well as his wife and with full knowledge of this and apparent understanding he gives his informed consent for surgery.     Findings: Findings of acute cholecystitis cholelithiasis, findings of significantly inflamed gallbladder and cholecystitis, cholangiograms completed and normal blood loss was less than 50 cc.  Due to the acute inflammation and thick-walled gallbladder, a 15 mm round Alexandru-Lemus drain was placed        He was subsequently admitted had findings of acute cholecystitis cholelithiasis, very inflamed  gallbladder was noted some of the back wall of the gallbladder and liver were removed, he had a slight leak of bile the first day, that is gradually reducing, his liver functions were extremely elevated the day after surgery with a total bilirubin up to 5, quick check 4 hours later showed it was reduced to 4 he in May effect had passed a small stone that did pass he had extreme pain preoperatively that was really out of character for acute cholecystitis overall.  Possible common duct stone that was passed?  He is doing well from his surgery we will discharge him follow-up with me in 4 days for wound check and check his labs and drain if the drainage output is reduced we will look toward removal of the drain on Wednesday.   Latest Reference Range & Units 05/07/23 03:27   Chloride 98 - 107 mmol/L 102   Anion Gap 5.0 - 15.0 mmol/L 8.0   Creatinine 0.76 - 1.27 mg/dL 0.71 (L)   BUN 6 - 20 mg/dL 8   BUN/Creatinine Ratio 7.0 - 25.0  11.3   Calcium 8.6 - 10.5 mg/dL 10.8 (H)   eGFR >60.0 mL/min/1.73 107.0   Alkaline Phosphatase 39 - 117 U/L 148 (H)   Total Protein 6.0 - 8.5 g/dL 6.1   ALT (SGPT) 1 - 41 U/L 307 (H)   AST (SGOT) 1 - 40 U/L 122 (H)   Total Bilirubin 0.0 - 1.2 mg/dL 1.1   Albumin 3.5 - 5.2 g/dL 3.3 (L)   Globulin gm/dL 2.8   A/G Ratio g/dL 1.2   (L): Data is abnormally low  (H): Data is abnormally high  He is currently 6 days out from laparoscopic exploration cholecystectomy for acute gangrenous cholecystitis, he has had problems intermittently for the past 2 weeks he was from the [place] area traveling and he had this onset.  His liver functions went up significantly after surgery but his cholangiogram was normal they have subsequently come down and he is here for follow-up.  His drainage output is minimal he has no nausea or vomiting he is feeling much better of course not back to normal yet but the very significant pain has resolved.  History  Past Medical History:   Diagnosis Date   • C. difficile diarrhea     • Kidney stones    • Staph infection    ,   Past Surgical History:   Procedure Laterality Date   • CHOLECYSTECTOMY WITH INTRAOPERATIVE CHOLANGIOGRAM N/A 5/4/2023    Procedure: CHOLECYSTECTOMY LAPAROSCOPIC INTRAOPERATIVE CHOLANGIOGRAM;  Surgeon: Kevin Cintron MD;  Location: [facility];  Service: General;  Laterality: N/A;   • INCISION AND DRAINAGE OF WOUND     • KIDNEY STONE SURGERY Bilateral    , No family history on file.,   Social History     Tobacco Use   • Smoking status: Never   • Smokeless tobacco: Never   Vaping Use   • Vaping Use: Never used   Substance Use Topics   • Alcohol use: Not Currently     Comment: occasionally   • Drug use: Never   , (Not in a hospital admission)   and Allergies:  Amoxicillin and Morphine    Current Outpatient Medications:   •  cetirizine (zyrTEC) 10 MG tablet, Take 1 tablet by mouth Daily., Disp: , Rfl:   •  HYDROcodone-acetaminophen (NORCO) 7.5-325 MG per tablet, Take 1 tablet by mouth Every 4 (Four) Hours As Needed for Moderate Pain, Disp: 20 tablet, Rfl: 0  •  ketorolac (TORADOL) 10 MG tablet, Take 1 tablet by mouth Every 6 (Six) Hours As Needed for Moderate Pain for up to 15 doses., Disp: 15 tablet, Rfl: 1  •  levoFLOXacin (Levaquin) 500 MG tablet, Take 1 tablet by mouth Daily for 5 days., Disp: 5 tablet, Rfl: 0  •  methylcellulose (Citrucel) oral powder, Take 2 application by mouth Daily for 30 doses., Disp: 60 g, Rfl: 4  •  ondansetron (Zofran) 8 MG tablet, Take 0.5 tablets by mouth Every 8 (Eight) Hours As Needed for Nausea or Vomiting for up to 5 doses., Disp: 5 tablet, Rfl: 1  •  pantoprazole (PROTONIX) 40 MG EC tablet, Take 1 tablet by mouth Daily., Disp: , Rfl:   •  sucralfate (CARAFATE) 1 g tablet, Take 1 tablet by mouth 4 (Four) Times a Day As Needed (reflux)., Disp: , Rfl:     Objective     Vital Signs   There were no vitals taken for this visit.     Physical Exam:  Respirations clear and equal    Cardiovascular exam regular rate and rhythm  Abdomen is soft,  "flat, nontender, nondistended, Alexandru-Lemus drain is draining minimal serous type fluid, no bilious material, and output less than 20 cc a day.  The drain was removed and dressed, his liver functions are greatly improved, his bilirubin is down from from 5 currently it is 0.8, his alkaline phos is down to 167 down from 300, his  down from 365 and G OT 78 down from 156.  All these numbers are improving.  The drain was removed a dressing was placed.      Results Review:  Result Review :  Lab Results   Component Value Date    FINALDX  05/04/2023     Gallbladder, cholecystectomy:  A.  Acute gangrenous cholecystitis.  B.  Cholelithiasis.  C.  No histologic evidence of malignancy.      GROSSDES  05/04/2023     1. Gallbladder.   Received in a formalin filled container labeled with the patient's name, date of birth, and \"gallbladder and contents\".  The specimen consists of a previously opened gallbladder measuring 9.3 x 3.5 x 2.8 cm.  The serosal surface is red-tan, roughened and dusky.  The cystic duct exhibits surgical disruption and patency cannot be definitively determined.  The gallbladder wall averages 0.2 cm in thickness, the mucosa is tan-brown and granular.  Within the lumen are multiple yellow-brown, nodular stones measuring up to 0.8 cm.  Representative sections of gallbladder wall and the cystic duct resection margin are submitted in block 1A.           BMI is >= 30 and <35. (Class 1 Obesity). The following options were offered after discussion;: exercise counseling/recommendations    Assessment & Plan       57-year-old gentleman with severe gangrenous cholecystitis presently doing well 1 week out from surgery, 6 days out from surgery doing well he will be discharged to follow-up with me in 6 weeks when he returns from the [place] area as he relocates, I will see him back when he returns with labs to be completed to assure normalization of his liver functions.  Discussed BMI elevation and need to move " toward a reduced BMI for health concerns he appears to understand he is a non smoker and his meds were reviewed.      Kevin Cintron MD  05/10/23  07:46 CDT            Answers for HPI/ROS submitted by the patient on 5/9/2023  What is the primary reason for your visit?: Other  Please describe your symptoms.: Post op gallbladder removal  Have you had these symptoms before?: No  How long have you been having these symptoms?: 1-4 days

## 2023-10-23 ENCOUNTER — HOSPITAL ENCOUNTER (EMERGENCY)
Age: 58
Discharge: HOME | End: 2023-10-23
Attending: EMERGENCY MEDICINE

## 2023-10-23 ENCOUNTER — APPOINTMENT (OUTPATIENT)
Dept: GENERAL RADIOLOGY | Age: 58
End: 2023-10-23
Attending: EMERGENCY MEDICINE

## 2023-10-23 VITALS
OXYGEN SATURATION: 93 % | WEIGHT: 247 LBS | HEIGHT: 70 IN | TEMPERATURE: 97.3 F | BODY MASS INDEX: 35.36 KG/M2 | DIASTOLIC BLOOD PRESSURE: 82 MMHG | HEART RATE: 91 BPM | RESPIRATION RATE: 16 BRPM | SYSTOLIC BLOOD PRESSURE: 156 MMHG

## 2023-10-23 DIAGNOSIS — M10.9 ACUTE GOUT, UNSPECIFIED CAUSE, UNSPECIFIED SITE: Primary | ICD-10-CM

## 2023-10-23 LAB
ALBUMIN SERPL-MCNC: 3.8 G/DL (ref 3.6–5.1)
ALBUMIN/GLOB SERPL: 1.1 {RATIO} (ref 1–2.4)
ALP SERPL-CCNC: 112 UNITS/L (ref 45–117)
ALT SERPL-CCNC: 50 UNITS/L
ANION GAP SERPL CALC-SCNC: 15 MMOL/L (ref 7–19)
AST SERPL-CCNC: 26 UNITS/L
BASOPHILS # BLD: 0.1 K/MCL (ref 0–0.3)
BASOPHILS NFR BLD: 1 %
BILIRUB SERPL-MCNC: 0.6 MG/DL (ref 0.2–1)
BUN SERPL-MCNC: 17 MG/DL (ref 6–20)
BUN/CREAT SERPL: 20 (ref 7–25)
CALCIUM SERPL-MCNC: 10.4 MG/DL (ref 8.4–10.2)
CHLORIDE SERPL-SCNC: 106 MMOL/L (ref 97–110)
CO2 SERPL-SCNC: 25 MMOL/L (ref 21–32)
CREAT SERPL-MCNC: 0.84 MG/DL (ref 0.67–1.17)
DEPRECATED RDW RBC: 44.5 FL (ref 39–50)
EGFRCR SERPLBLD CKD-EPI 2021: >90 ML/MIN/{1.73_M2}
EOSINOPHIL # BLD: 0.2 K/MCL (ref 0–0.5)
EOSINOPHIL NFR BLD: 1 %
ERYTHROCYTE [DISTWIDTH] IN BLOOD: 13.3 % (ref 11–15)
FASTING DURATION TIME PATIENT: ABNORMAL H
GLOBULIN SER-MCNC: 3.4 G/DL (ref 2–4)
GLUCOSE SERPL-MCNC: 106 MG/DL (ref 70–99)
HCT VFR BLD CALC: 51.4 % (ref 39–51)
HGB BLD-MCNC: 16.9 G/DL (ref 13–17)
IMM GRANULOCYTES # BLD AUTO: 0.2 K/MCL (ref 0–0.2)
IMM GRANULOCYTES # BLD: 1 %
LYMPHOCYTES # BLD: 2 K/MCL (ref 1–4)
LYMPHOCYTES NFR BLD: 13 %
MCH RBC QN AUTO: 29.9 PG (ref 26–34)
MCHC RBC AUTO-ENTMCNC: 32.9 G/DL (ref 32–36.5)
MCV RBC AUTO: 90.8 FL (ref 78–100)
MONOCYTES # BLD: 1.6 K/MCL (ref 0.3–0.9)
MONOCYTES NFR BLD: 10 %
NEUTROPHILS # BLD: 11 K/MCL (ref 1.8–7.7)
NEUTROPHILS NFR BLD: 74 %
NRBC BLD MANUAL-RTO: 0 /100 WBC
PLATELET # BLD AUTO: 316 K/MCL (ref 140–450)
POTASSIUM SERPL-SCNC: 4 MMOL/L (ref 3.4–5.1)
PROT SERPL-MCNC: 7.2 G/DL (ref 6.4–8.2)
RBC # BLD: 5.66 MIL/MCL (ref 4.5–5.9)
SODIUM SERPL-SCNC: 142 MMOL/L (ref 135–145)
URATE SERPL-MCNC: 6.7 MG/DL (ref 3.5–7.2)
WBC # BLD: 15.1 K/MCL (ref 4.2–11)

## 2023-10-23 PROCEDURE — 10002803 HB RX 637: Performed by: EMERGENCY MEDICINE

## 2023-10-23 PROCEDURE — 80053 COMPREHEN METABOLIC PANEL: CPT | Performed by: EMERGENCY MEDICINE

## 2023-10-23 PROCEDURE — 99284 EMERGENCY DEPT VISIT MOD MDM: CPT | Performed by: EMERGENCY MEDICINE

## 2023-10-23 PROCEDURE — 36415 COLL VENOUS BLD VENIPUNCTURE: CPT

## 2023-10-23 PROCEDURE — 84550 ASSAY OF BLOOD/URIC ACID: CPT | Performed by: EMERGENCY MEDICINE

## 2023-10-23 PROCEDURE — 73630 X-RAY EXAM OF FOOT: CPT

## 2023-10-23 PROCEDURE — 85025 COMPLETE CBC W/AUTO DIFF WBC: CPT | Performed by: EMERGENCY MEDICINE

## 2023-10-23 PROCEDURE — 99283 EMERGENCY DEPT VISIT LOW MDM: CPT

## 2023-10-23 PROCEDURE — 73630 X-RAY EXAM OF FOOT: CPT | Performed by: RADIOLOGY

## 2023-10-23 RX ORDER — ONDANSETRON 4 MG/1
4 TABLET, ORALLY DISINTEGRATING ORAL ONCE
Status: COMPLETED | OUTPATIENT
Start: 2023-10-23 | End: 2023-10-23

## 2023-10-23 RX ORDER — HYDROCODONE BITARTRATE AND ACETAMINOPHEN 5; 325 MG/1; MG/1
1 TABLET ORAL ONCE
Status: COMPLETED | OUTPATIENT
Start: 2023-10-23 | End: 2023-10-23

## 2023-10-23 RX ORDER — INDOMETHACIN 25 MG/1
50 CAPSULE ORAL
Qty: 30 CAPSULE | Refills: 0 | Status: SHIPPED | OUTPATIENT
Start: 2023-10-23 | End: 2023-10-28

## 2023-10-23 RX ORDER — HYDROCODONE BITARTRATE AND ACETAMINOPHEN 5; 325 MG/1; MG/1
1 TABLET ORAL EVERY 4 HOURS PRN
Qty: 8 TABLET | Refills: 0 | Status: SHIPPED | OUTPATIENT
Start: 2023-10-23

## 2023-10-23 RX ORDER — INDOMETHACIN 25 MG/1
50 CAPSULE ORAL ONCE
Status: COMPLETED | OUTPATIENT
Start: 2023-10-23 | End: 2023-10-23

## 2023-10-23 RX ORDER — INDOMETHACIN 50 MG/1
50 CAPSULE ORAL ONCE
Status: DISCONTINUED | OUTPATIENT
Start: 2023-10-23 | End: 2023-10-23 | Stop reason: SDUPTHER

## 2023-10-23 RX ORDER — COLCHICINE 0.6 MG/1
1.2 TABLET ORAL ONCE
Status: DISCONTINUED | OUTPATIENT
Start: 2023-10-23 | End: 2023-10-23 | Stop reason: HOSPADM

## 2023-10-23 RX ORDER — ONDANSETRON 4 MG/1
4 TABLET, ORALLY DISINTEGRATING ORAL EVERY 6 HOURS PRN
Qty: 10 TABLET | Refills: 0 | Status: SHIPPED | OUTPATIENT
Start: 2023-10-23

## 2023-10-23 RX ADMIN — HYDROCODONE BITARTRATE AND ACETAMINOPHEN 1 TABLET: 5; 325 TABLET ORAL at 04:42

## 2023-10-23 RX ADMIN — ONDANSETRON 4 MG: 4 TABLET, ORALLY DISINTEGRATING ORAL at 04:51

## 2023-10-23 RX ADMIN — INDOMETHACIN 50 MG: 25 CAPSULE ORAL at 04:51

## 2023-10-23 ASSESSMENT — PAIN SCALES - GENERAL: PAINLEVEL_OUTOF10: 6

## 2024-07-29 ENCOUNTER — ANESTHESIA EVENT (OUTPATIENT)
Dept: SURGERY | Age: 59
End: 2024-07-29

## 2024-07-29 SDOH — SOCIAL STABILITY: SOCIAL INSECURITY: HOW OFTEN DOES ANYONE, INCLUDING FAMILY AND FRIENDS, PHYSICALLY HURT YOU?: NEVER

## 2024-07-29 SDOH — SOCIAL STABILITY: SOCIAL INSECURITY: HOW OFTEN DOES ANYONE, INCLUDING FAMILY AND FRIENDS, SCREAM OR CURSE AT YOU?: NEVER

## 2024-07-29 SDOH — SOCIAL STABILITY: SOCIAL INSECURITY: HOW OFTEN DOES ANYONE, INCLUDING FAMILY AND FRIENDS, THREATEN YOU WITH HARM?: NEVER

## 2024-07-29 SDOH — SOCIAL STABILITY: SOCIAL INSECURITY: HOW OFTEN DOES ANYONE, INCLUDING FAMILY AND FRIENDS, INSULT OR TALK DOWN TO YOU?: NEVER

## 2024-07-29 ASSESSMENT — ACTIVITIES OF DAILY LIVING (ADL)
NEEDS_ASSIST: NO
ADL_BEFORE_ADMISSION: INDEPENDENT
SENSORY_SUPPORT_DEVICES: EYEGLASSES
RECENT_DECLINE_ADL: NO
HISTORY OF FALLING IN THE LAST YEAR (PRIOR TO ADMISSION): NO
CHRONIC_PAIN_PRESENT: NO
ADL_SHORT_OF_BREATH: NO
ADL_SCORE: 12

## 2024-07-30 ENCOUNTER — HOSPITAL ENCOUNTER (OUTPATIENT)
Age: 59
Discharge: HOME | End: 2024-07-30
Attending: OPHTHALMOLOGY | Admitting: OPHTHALMOLOGY

## 2024-07-30 ENCOUNTER — ANESTHESIA (OUTPATIENT)
Dept: SURGERY | Age: 59
End: 2024-07-30

## 2024-07-30 VITALS
BODY MASS INDEX: 35.36 KG/M2 | HEART RATE: 72 BPM | OXYGEN SATURATION: 98 % | RESPIRATION RATE: 16 BRPM | DIASTOLIC BLOOD PRESSURE: 83 MMHG | WEIGHT: 247 LBS | SYSTOLIC BLOOD PRESSURE: 170 MMHG | TEMPERATURE: 99.1 F | HEIGHT: 70 IN

## 2024-07-30 DIAGNOSIS — H43.21 ASTEROID HYALOSIS, RIGHT EYE: Primary | ICD-10-CM

## 2024-07-30 PROCEDURE — 10002767 HB EXTENDED RECOVERY PER HOUR: Performed by: OPHTHALMOLOGY

## 2024-07-30 PROCEDURE — 10002801 HB RX 250 W/O HCPCS: Performed by: OPHTHALMOLOGY

## 2024-07-30 PROCEDURE — 10002362 HB ANESTH MAC IV 1ST 1/2 HR: Performed by: OPHTHALMOLOGY

## 2024-07-30 PROCEDURE — 10001568 HB ANESTH MAC IV ADD'L 1/2 HR: Performed by: OPHTHALMOLOGY

## 2024-07-30 PROCEDURE — 10002803 HB RX 637: Performed by: OPHTHALMOLOGY

## 2024-07-30 PROCEDURE — 10006391 HB COMPLEX PROCEDURE: Performed by: OPHTHALMOLOGY

## 2024-07-30 PROCEDURE — 10006023 HB SUPPLY 272: Performed by: OPHTHALMOLOGY

## 2024-07-30 PROCEDURE — 10002800 HB RX 250 W HCPCS: Performed by: OPHTHALMOLOGY

## 2024-07-30 PROCEDURE — 10002117 HB ADDITIONAL SURGERY TIME/30 MIN: Performed by: OPHTHALMOLOGY

## 2024-07-30 PROCEDURE — 10002800 HB RX 250 W HCPCS: Performed by: ANESTHESIOLOGY

## 2024-07-30 RX ORDER — PREDNISOLONE ACETATE 10 MG/ML
1 SUSPENSION/ DROPS OPHTHALMIC ONCE
Status: COMPLETED | OUTPATIENT
Start: 2024-07-30 | End: 2024-07-30

## 2024-07-30 RX ORDER — DEXTROSE MONOHYDRATE 50 MG/ML
INJECTION, SOLUTION INTRAVENOUS CONTINUOUS PRN
Status: DISCONTINUED | OUTPATIENT
Start: 2024-07-30 | End: 2024-07-30 | Stop reason: HOSPADM

## 2024-07-30 RX ORDER — SODIUM CHLORIDE 9 MG/ML
INJECTION, SOLUTION INTRAVENOUS CONTINUOUS
Status: DISCONTINUED | OUTPATIENT
Start: 2024-07-30 | End: 2024-07-30 | Stop reason: HOSPADM

## 2024-07-30 RX ORDER — LIDOCAINE HYDROCHLORIDE 10 MG/ML
5 INJECTION, SOLUTION INFILTRATION; PERINEURAL PRN
Status: DISCONTINUED | OUTPATIENT
Start: 2024-07-30 | End: 2024-07-30 | Stop reason: HOSPADM

## 2024-07-30 RX ORDER — PREDNISOLONE ACETATE 10 MG/ML
1 SUSPENSION/ DROPS OPHTHALMIC 4 TIMES DAILY
Qty: 10 ML | Refills: 3 | Status: SHIPPED | OUTPATIENT
Start: 2024-07-30

## 2024-07-30 RX ORDER — PHENYLEPHRINE HYDROCHLORIDE 100 MG/ML
2 SOLUTION/ DROPS OPHTHALMIC
Status: DISCONTINUED | OUTPATIENT
Start: 2024-07-30 | End: 2024-07-30 | Stop reason: HOSPADM

## 2024-07-30 RX ORDER — 0.9 % SODIUM CHLORIDE 0.9 %
2 VIAL (ML) INJECTION EVERY 12 HOURS SCHEDULED
Status: DISCONTINUED | OUTPATIENT
Start: 2024-07-30 | End: 2024-07-30 | Stop reason: HOSPADM

## 2024-07-30 RX ORDER — NICOTINE POLACRILEX 4 MG
30 LOZENGE BUCCAL
Status: DISCONTINUED | OUTPATIENT
Start: 2024-07-30 | End: 2024-07-30 | Stop reason: HOSPADM

## 2024-07-30 RX ORDER — DEXTROSE MONOHYDRATE 25 G/50ML
25 INJECTION, SOLUTION INTRAVENOUS PRN
Status: DISCONTINUED | OUTPATIENT
Start: 2024-07-30 | End: 2024-07-30 | Stop reason: HOSPADM

## 2024-07-30 RX ORDER — WATER 99.05 ML/100ML
SOLUTION OPHTHALMIC
Qty: 118 ML | Refills: 0 | Status: SHIPPED | OUTPATIENT
Start: 2024-07-30

## 2024-07-30 RX ORDER — TETRACAINE HYDROCHLORIDE 5 MG/ML
SOLUTION OPHTHALMIC PRN
Status: DISCONTINUED | OUTPATIENT
Start: 2024-07-30 | End: 2024-07-30 | Stop reason: HOSPADM

## 2024-07-30 RX ORDER — PHENYLEPHRINE HYDROCHLORIDE 100 MG/ML
2 SOLUTION/ DROPS OPHTHALMIC
Status: COMPLETED | OUTPATIENT
Start: 2024-07-30 | End: 2024-07-30

## 2024-07-30 RX ORDER — BALANCED SALT SOLUTION 6.4; .75; .48; .3; 3.9; 1.7 MG/ML; MG/ML; MG/ML; MG/ML; MG/ML; MG/ML
SOLUTION OPHTHALMIC PRN
Status: DISCONTINUED | OUTPATIENT
Start: 2024-07-30 | End: 2024-07-30 | Stop reason: HOSPADM

## 2024-07-30 RX ORDER — VANCOMYCIN HYDROCHLORIDE 500 MG/10ML
INJECTION, POWDER, LYOPHILIZED, FOR SOLUTION INTRAVENOUS PRN
Status: DISCONTINUED | OUTPATIENT
Start: 2024-07-30 | End: 2024-07-30 | Stop reason: HOSPADM

## 2024-07-30 RX ORDER — CYCLOPENTOLATE HYDROCHLORIDE 10 MG/ML
2 SOLUTION/ DROPS OPHTHALMIC
Status: COMPLETED | OUTPATIENT
Start: 2024-07-30 | End: 2024-07-30

## 2024-07-30 RX ORDER — ONDANSETRON 4 MG/1
4 TABLET, ORALLY DISINTEGRATING ORAL EVERY 8 HOURS PRN
Qty: 5 TABLET | Refills: 0 | Status: SHIPPED | OUTPATIENT
Start: 2024-07-30

## 2024-07-30 RX ORDER — MOXIFLOXACIN 5 MG/ML
1 SOLUTION/ DROPS OPHTHALMIC 4 TIMES DAILY
Qty: 3 ML | Refills: 0 | Status: SHIPPED | OUTPATIENT
Start: 2024-07-30

## 2024-07-30 RX ORDER — ATROPINE SULFATE 10 MG/ML
2 SOLUTION/ DROPS OPHTHALMIC EVERY 6 HOURS
Status: DISCONTINUED | OUTPATIENT
Start: 2024-07-30 | End: 2024-07-30 | Stop reason: HOSPADM

## 2024-07-30 RX ORDER — TRIAMCINOLONE ACETONIDE 40 MG/ML
INJECTION, SUSPENSION INTRA-ARTICULAR; INTRAMUSCULAR PRN
Status: DISCONTINUED | OUTPATIENT
Start: 2024-07-30 | End: 2024-07-30 | Stop reason: HOSPADM

## 2024-07-30 RX ORDER — SODIUM CHLORIDE, SODIUM LACTATE, POTASSIUM CHLORIDE, CALCIUM CHLORIDE 600; 310; 30; 20 MG/100ML; MG/100ML; MG/100ML; MG/100ML
INJECTION, SOLUTION INTRAVENOUS CONTINUOUS
Status: DISCONTINUED | OUTPATIENT
Start: 2024-07-30 | End: 2024-07-30 | Stop reason: HOSPADM

## 2024-07-30 RX ORDER — MIDAZOLAM HYDROCHLORIDE 1 MG/ML
INJECTION, SOLUTION INTRAMUSCULAR; INTRAVENOUS PRN
Status: DISCONTINUED | OUTPATIENT
Start: 2024-07-30 | End: 2024-07-30

## 2024-07-30 RX ORDER — CYCLOPENTOLATE HYDROCHLORIDE 10 MG/ML
2 SOLUTION/ DROPS OPHTHALMIC
Status: DISCONTINUED | OUTPATIENT
Start: 2024-07-30 | End: 2024-07-30 | Stop reason: HOSPADM

## 2024-07-30 RX ORDER — ATROPINE SULFATE 10 MG/ML
2 SOLUTION/ DROPS OPHTHALMIC ONCE
Status: COMPLETED | OUTPATIENT
Start: 2024-07-30 | End: 2024-07-30

## 2024-07-30 RX ORDER — MOXIFLOXACIN 5 MG/ML
2 SOLUTION/ DROPS OPHTHALMIC ONCE
Status: COMPLETED | OUTPATIENT
Start: 2024-07-30 | End: 2024-07-30

## 2024-07-30 RX ORDER — NALOXONE HCL 0.4 MG/ML
0.2 VIAL (ML) INJECTION EVERY 5 MIN PRN
Status: DISCONTINUED | OUTPATIENT
Start: 2024-07-30 | End: 2024-07-30 | Stop reason: HOSPADM

## 2024-07-30 RX ADMIN — FENTANYL CITRATE 50 MCG: 50 INJECTION INTRAMUSCULAR; INTRAVENOUS at 10:06

## 2024-07-30 RX ADMIN — CYCLOPENTOLATE HYDROCHLORIDE 2 DROP: 10 SOLUTION/ DROPS OPHTHALMIC at 08:20

## 2024-07-30 RX ADMIN — CYCLOPENTOLATE HYDROCHLORIDE 2 DROP: 10 SOLUTION/ DROPS OPHTHALMIC at 09:08

## 2024-07-30 RX ADMIN — CYCLOPENTOLATE HYDROCHLORIDE 2 DROP: 10 SOLUTION/ DROPS OPHTHALMIC at 07:47

## 2024-07-30 RX ADMIN — PHENYLEPHRINE HYDROCHLORIDE 2 DROP: 100 SOLUTION/ DROPS OPHTHALMIC at 07:46

## 2024-07-30 RX ADMIN — MIDAZOLAM HYDROCHLORIDE 2 MG: 1 INJECTION, SOLUTION INTRAMUSCULAR; INTRAVENOUS at 10:03

## 2024-07-30 RX ADMIN — FENTANYL CITRATE 50 MCG: 50 INJECTION INTRAMUSCULAR; INTRAVENOUS at 10:16

## 2024-07-30 RX ADMIN — CYCLOPENTOLATE HYDROCHLORIDE 2 DROP: 10 SOLUTION/ DROPS OPHTHALMIC at 08:10

## 2024-07-30 RX ADMIN — PHENYLEPHRINE HYDROCHLORIDE 2 DROP: 100 SOLUTION/ DROPS OPHTHALMIC at 09:09

## 2024-07-30 RX ADMIN — MOXIFLOXACIN OPHTHALMIC SOLUTION 2 DROP: 5 SOLUTION/ DROPS OPHTHALMIC at 07:48

## 2024-07-30 RX ADMIN — PHENYLEPHRINE HYDROCHLORIDE 2 DROP: 100 SOLUTION/ DROPS OPHTHALMIC at 08:20

## 2024-07-30 RX ADMIN — ATROPINE SULFATE 2 DROP: 10 SOLUTION/ DROPS OPHTHALMIC at 09:09

## 2024-07-30 RX ADMIN — PHENYLEPHRINE HYDROCHLORIDE 2 DROP: 100 SOLUTION/ DROPS OPHTHALMIC at 08:10

## 2024-07-30 RX ADMIN — PREDNISOLONE ACETATE 1 DROP: 10 SUSPENSION/ DROPS OPHTHALMIC at 07:46

## 2024-07-30 RX ADMIN — ATROPINE SULFATE 2 DROP: 10 SOLUTION/ DROPS OPHTHALMIC at 08:47

## 2024-07-30 ASSESSMENT — PAIN SCALES - GENERAL
PAINLEVEL_OUTOF10: 2

## 2024-10-01 ENCOUNTER — TELEPHONE (OUTPATIENT)
Dept: GASTROENTEROLOGY | Age: 59
End: 2024-10-01

## (undated) DEVICE — MONOPOLAR METZENBAUM SCISSOR, MINI BLADE TIP, DISPOSABLE: Brand: MONOPOLAR METZENBAUM SCISSOR, MINI BLADE TIP, DISPOSABLE

## (undated) DEVICE — MMIS - GLOVE SURG 7.5 PROTEXIS LF CRM PF BEAD CUFF STRL

## (undated) DEVICE — MMIS - BALLOON ENDO CUFF US LTX

## (undated) DEVICE — MMIS - SYRINGE 50ML GRAD N-PYRG DEHP-FR PVC FREE STRL MED

## (undated) DEVICE — MMIS - FORCEPS BIOPSY NDL 240CM 2.2MM RJ 4 2.8MM STD

## (undated) DEVICE — MMIS - GOWN SURG XL AAMI L3 REINFORCE STRL BACK SET IN

## (undated) DEVICE — MMIS - TUBING SCT CLR 10FT 8MM MDVC MAXI-GRIP NCDTV MALE

## (undated) DEVICE — MMIS - EXTRACTOR STONE 115CM 2.4FR 1CM MOD BSKT NCRCL

## (undated) DEVICE — MMIS - POSITIONER PSTN FOAM CRDL HDRST LF

## (undated) DEVICE — MMIS - FIBER LSR MOSES 80HZ 120W 6J 550UM D/F/L LTHTRP

## (undated) DEVICE — GLOVE SURG 8 PROTEXIS PI MIC LF CRM PF SMTH BEAD CUFF STRL

## (undated) DEVICE — MMIS - GLOVE SURG 7 PROTEXIS LF BLUE PF SMTH BEAD CUFF

## (undated) DEVICE — MMIS - SYRINGE 10ML STRL MED LF DISP LL

## (undated) DEVICE — MMIS - PAD PSTN RSPBRY SWIRL 20X8X2IN DEVON FOAM

## (undated) DEVICE — MMIS - DECANTER VIAL FLUID DSPNSR VLJT

## (undated) DEVICE — MMIS - GLOVE SURG 7 PROTEXIS LF CRM PF BEAD CUFF STRL

## (undated) DEVICE — ST TB EXT STANDARDBORE 30IN

## (undated) DEVICE — MMIS - TRAP SPEC RTRVL ETRAP MAGNIFY WDW MSR GUIDE POLYP

## (undated) DEVICE — MMIS - NEEDLE SCLRTX 25GA 5MM 230CM INJ PRJ STRL DISP

## (undated) DEVICE — MMIS - KIT PROC DISP VLV CNCT ENDO CARRY-ON DEFENDO

## (undated) DEVICE — MMIS - EXTRACTOR STONE 115CM 2.4FR 1CM 16 4WR BSKT TPLS

## (undated) DEVICE — OCULUS BIOM READY+ 200 FOR F=200 MM

## (undated) DEVICE — POSITIONER PSTN 9IN DONUT HEAD FOAM

## (undated) DEVICE — PACK VITRTM 25GA STELLARIS ELITE POSTERIOR 2 BLADE CUTTER

## (undated) DEVICE — MMIS - GOWN SURG LG STD AAMI L3 STRL BACK SET IN SLV TWL

## (undated) DEVICE — GLOVE SURG 6.5 PROTEXIS PI MIC LF CRM PF SMTH BEAD CUFF STRL

## (undated) DEVICE — MMIS - JELLY LUB 1.25OZ HR SAFEWRAP ONESHOT STRL LF

## (undated) DEVICE — MMIS - LIDOCAINE JELLY URO-JET 10ML (RX)

## (undated) DEVICE — MMIS - SHEET PATRAN TRNSF

## (undated) DEVICE — MMIS - GUIDEWIRE ZPWR .035IN 150CM URO RADOPQ STRBL STRGT

## (undated) DEVICE — MMIS - CIRCUIT BRTHG EXPAND 87IN UNV FLEX2

## (undated) DEVICE — MMIS - ELECTRODE PT RTN C30- LB 9FT CORD NONIRRITATE

## (undated) DEVICE — MMIS - GUIDEWIRE SNSR .035IN 150CM URO RADOPQ KINK RST

## (undated) DEVICE — MMIS - BLANKET WRM UPR BODY 78.7X29.9IN MISTRAL-AIR FORCE

## (undated) DEVICE — SOLUTION IRR 500ML 0.9% NACL PLASTIC POUR BTL ISTNC N-PYRG

## (undated) DEVICE — MMIS - TRAY SURG PRP 109ML 89ML PVP IOD STD PAINT 3 CMPRT

## (undated) DEVICE — BAPTIST TURNOVER KIT: Brand: MEDLINE INDUSTRIES, INC.

## (undated) DEVICE — MMIS - SOLUTION URMTC 0.9% NACL 3L LF IRR

## (undated) DEVICE — MMIS - PACK SURG GN SRG

## (undated) DEVICE — GOWN SURG XL L3 NONREINFORCE SET IN SLV STRL LF DISP BLUE

## (undated) DEVICE — MMIS - SUTURE MONOCRYL + 3-0 SH 27IN MONO ANBCTRL ABS

## (undated) DEVICE — MMIS - SOLUTION IRR 5000ML 0.9% NACL ARTHMTC

## (undated) DEVICE — WATER STRL 500ML PLASTIC POUR BTL LF

## (undated) DEVICE — MMIS - SOLUTION AQLT 0.9% NACL 1000ML LF IRR

## (undated) DEVICE — MMIS - SNARE DCKBL 15MM 7FR ACSNR ENDO ESPH CLN PLPCTM

## (undated) DEVICE — MMIS - LEGGINGS SURG CNVRT INVIEW POLY 49X31IN 6IN XLONG

## (undated) DEVICE — ILLUMINATOR OPHTHALMIC 25GA STELLARIS PC WIDE FIELD STRL

## (undated) DEVICE — MMIS - SUTURE ETHILON 2-0 FS 18IN MONO NABSB BLK

## (undated) DEVICE — 2, DISPOSABLE SUCTION/IRRIGATOR WITHOUT DISPOSABLE TIP: Brand: STRYKEFLOW

## (undated) DEVICE — SUT VIC 3/0 RB1 27IN UD VCP215H

## (undated) DEVICE — SOLUTION OPHTHALMIC 30ML PREP HDPE BTL 5% PVP IOD BDINE STRL

## (undated) DEVICE — DRSNG SURESITE WNDW 4X4.5

## (undated) DEVICE — MMIS - SLEEVE CMPR 26- IN LG KN LGTH SEQ KENDALL SCD LF

## (undated) DEVICE — MMIS - BLOCK BITE 60FR CAREGUARD DNTL RTNT RIM LAT PORT

## (undated) DEVICE — ADHS LIQ MASTISOL 2/3ML

## (undated) DEVICE — GLOVE SURG 7.5 PROTEXIS PI MIC LF CRM PF SMTH BEAD CUFF STRL

## (undated) DEVICE — MMIS - SNARE MIC OVAL 240CM 2.4MM 13MM CPTFLX FLXB ENDO

## (undated) DEVICE — CANNULA SUCTIRR 25GA .5MM 32MM 1MM FLXB LL HUB FLXTP DISP

## (undated) DEVICE — HANDLE PHACO STELLARIS XTN VITRTM

## (undated) DEVICE — SUT VIC 4/0 P3 18IN UD VCP494H

## (undated) DEVICE — MMIS - NEEDLE HPO 22GA 1.5IN REG WALL REG BVL LL HUB

## (undated) DEVICE — PAD LAP CHOLE: Brand: MEDLINE INDUSTRIES, INC.

## (undated) DEVICE — MMIS - SOLUTION PREP 2OZ PVP IOD NONIRRITATE

## (undated) DEVICE — MMIS - TUBING SCT CLR 6FT 3/16IN MDVC NCDTV MALE TO MALE

## (undated) DEVICE — MMIS - FIBER LSR HO HO DISP 237UM LF

## (undated) DEVICE — 3M™ IOBAN™ 2 ANTIMICROBIAL INCISE DRAPE 6650EZ: Brand: IOBAN™ 2

## (undated) DEVICE — GLV SURG TRIUMPH MICRO PF LTX 7.5 STRL

## (undated) DEVICE — MMIS - DRESSING TRANS TEGADERM 2.75INX2 3/8IN ADH HPOAL

## (undated) DEVICE — MMIS - SUTURE ETHILON MTPS 3-0 PS1 18IN MONO NABSB BLK

## (undated) DEVICE — MMIS - CONTAINER SPEC 120ML BLUE 53MM 2 THRD CAP INDIV

## (undated) DEVICE — MMIS - CONTRAST 300MG/ML 50ML ISOVUE 300 INJ VIAL 61%

## (undated) DEVICE — VAGINAL PREP TRAY: Brand: MEDLINE INDUSTRIES, INC.

## (undated) DEVICE — MMIS - SUTURE PRMHND 2-0 SH 30IN SILK BRAID NABSB BLK

## (undated) DEVICE — MMIS - SET SURGBSN 2 GRAD PTCHR STRL LF PLASTIC 54X54IN

## (undated) DEVICE — MMIS - LINER SCT 1000CC CRD MDVC LOCK LID SHTOF VLV CNSTR

## (undated) DEVICE — MMIS - EXTRACTOR STONE 115CM 1.7FR 11MM MULTIWIRE BSKT

## (undated) DEVICE — TOWEL,OR,DSP,ST,BLUE,STD,4/PK,20PK/CS: Brand: MEDLINE

## (undated) DEVICE — MMIS - SOLUTION 1L AQLT H2O STRL LF IRR

## (undated) DEVICE — TRAP FLD MINIVAC MEGADYNE 100ML

## (undated) DEVICE — MMIS - SPONGE LAP 18X18IN PLASTIC GAUZE CTN 4 PLY PREWASH

## (undated) DEVICE — MMIS - STRAP PSTN 60X3IN DEVON BODY KN

## (undated) DEVICE — DRN JP RND NO TROC SIL 15F 3/16IN

## (undated) DEVICE — ENDOPOUCH RETRIEVER SPECIMEN RETRIEVAL BAGS: Brand: ENDOPOUCH RETRIEVER

## (undated) DEVICE — MMIS - COVER SRG CNTRL STRL LF LIGHT HNDL HARMONYAIR M

## (undated) DEVICE — SYR LUERLOK 50ML

## (undated) DEVICE — MMIS - FORCEPS BIOPSY NDL 240CM RJ 4 JMB DISP

## (undated) DEVICE — MMIS - SUTURE VCL+ 3-0 SH 27IN BRAID COAT ABS UNDYED

## (undated) DEVICE — CAUTERY OPHTHALMIC 25GA STELLARIS PC IO BP HNDPC STRL DISP

## (undated) DEVICE — MMIS - SYRINGE 20ML CONC TIP 1ML GRAD N-PYRG DEHP-FR STRL

## (undated) DEVICE — MMIS - DRAPE EASY CATCHER

## (undated) DEVICE — MMIS - PAD ABD 10X8IN DERMACEA ABS NONWOVEN 4 SEAL EDGE

## (undated) DEVICE — SUT VIC 0 UR6 27IN VCP603H

## (undated) DEVICE — MMIS - SOLUTION 500ML PLASTIC POUR BTL 0.9% NACL IRR

## (undated) DEVICE — STRIP CLS WND CURAD MEDI/STRIP HYPOALLERG 0.25X4IN PK/10

## (undated) DEVICE — RESERVOIR,SUCTION,100CC,SILICONE: Brand: MEDLINE

## (undated) DEVICE — MMIS - TUBING SCT CLR 20FT .25IN MDVC NCDTV MALE TO MALE

## (undated) DEVICE — MMIS - TIP SCT MDVC YNKR TPR BLBS CLR STRL LF DISP

## (undated) DEVICE — PDS II VLT 0 107CM AG ST3: Brand: ENDOLOOP

## (undated) DEVICE — SOLUTION IO 15ML BSS NONANTIBIOTIC IRR NA K CA MG

## (undated) DEVICE — MMIS - PACK SURG 2 SIDE SHT MAYO STAND CVR SUT BAG ABS

## (undated) DEVICE — MMIS - GLOVE SURG 8.5 PROTEXIS LF CRM PF BEAD CUFF STRL

## (undated) DEVICE — MMIS - MARKER TATTOO PREFL PREASSEMBLE SYRINGE PERM ENDO

## (undated) DEVICE — ST CATH CHOLANG WKARLAN/BALN 2LUM 4F 1.25

## (undated) DEVICE — 4-PORT MANIFOLD: Brand: NEPTUNE 2

## (undated) DEVICE — SUTURE VICRYL MICROPOINT 7-0 TG140-8 18IN 2 ARM BRAID COAT J546G

## (undated) DEVICE — MMIS - SPREADER ENDO 12.5-13.8MM AMPLIFEYE LG FLXB

## (undated) DEVICE — Device

## (undated) DEVICE — MMIS - PACK SURG CYSTO LTX SAFE

## (undated) DEVICE — TIP FCP GRSHBR REV DSP 360D 25GA TISS BACK FLUSH BLUNT LTWT

## (undated) DEVICE — SOLUTION SURGSCRB 4OZ PVP IOD ANTIMICROBIAL PLASTIC BTL FOAM

## (undated) DEVICE — MMIS - POSITIONER PSTN PINK 9X8X4.25IN DEVON HEAD FOAM

## (undated) DEVICE — SPNG GZ STRL 2S 4X4 12PLY

## (undated) DEVICE — MMIS - SYRINGETOOMEYIRRIGATION70CCSTERILE

## (undated) DEVICE — MMIS - PACK SURG CYSTO